# Patient Record
Sex: MALE | Race: BLACK OR AFRICAN AMERICAN | NOT HISPANIC OR LATINO
[De-identification: names, ages, dates, MRNs, and addresses within clinical notes are randomized per-mention and may not be internally consistent; named-entity substitution may affect disease eponyms.]

---

## 2020-09-18 ENCOUNTER — APPOINTMENT (OUTPATIENT)
Dept: NEUROLOGY | Facility: CLINIC | Age: 75
End: 2020-09-18
Payer: MEDICARE

## 2020-09-18 VITALS — BODY MASS INDEX: 25.9 KG/M2 | HEIGHT: 71 IN | TEMPERATURE: 98.1 F | WEIGHT: 185 LBS

## 2020-09-18 DIAGNOSIS — Z82.0 FAMILY HISTORY OF EPILEPSY AND OTHER DISEASES OF THE NERVOUS SYSTEM: ICD-10-CM

## 2020-09-18 DIAGNOSIS — N18.3 CHRONIC KIDNEY DISEASE, STAGE 3 (MODERATE): ICD-10-CM

## 2020-09-18 DIAGNOSIS — Z86.79 PERSONAL HISTORY OF OTHER DISEASES OF THE CIRCULATORY SYSTEM: ICD-10-CM

## 2020-09-18 PROCEDURE — 99204 OFFICE O/P NEW MOD 45 MIN: CPT

## 2020-09-18 NOTE — HISTORY OF PRESENT ILLNESS
[FreeTextEntry1] : Initial office visit September 18, 2020:\par This is a 74-year-old man who presents today for neurologic evaluation. About a year ago he had a syncopal event and went to the hospital. An MRI was done and showed a left basal ganglia stroke. He followed with an outside neurologist. He's had 3 or 4 other episodes of syncope. These but I we have been in the shower. He does take medicine for hypertension and additional antihypertensive drug for renal protection. He is also at least once mistaken his wife's medication, labetalol as well. His daughter also reports some mild memory loss at times and some intermittent confusion. He is here today for neurologic evaluation.

## 2020-09-18 NOTE — REASON FOR VISIT
[Consultation] : a consultation visit [Other: _____] : [unfilled] [FreeTextEntry1] : stroke, memory loss, syncope

## 2020-09-18 NOTE — CONSULT LETTER
[Dear  ___] : Dear  [unfilled], [Consult Letter:] : I had the pleasure of evaluating your patient, [unfilled]. [Please see my note below.] : Please see my note below. [Consult Closing:] : Thank you very much for allowing me to participate in the care of this patient.  If you have any questions, please do not hesitate to contact me. [Sincerely,] : Sincerely, [FreeTextEntry3] : Deny Greenwood M.D., Ph.D. DPN-N\par [facility]\par Neurology at [place]\par Medical Director of Stroke Services\par [facility]\par

## 2020-09-18 NOTE — ASSESSMENT
[FreeTextEntry1] : This is a 74-year-old man with a history of stroke last year. It was a left basal ganglia stroke and he appears to recover with no significant right-sided weakness. His daughter does report some intermittent confusion as well as intermittent syncopal episodes. I would like to do an EEG to evaluate for potential seizure. There was some mention on the report of the MRI of hippocampal asymmetry which may be age-related but also may contribute to potential seizure. I will call him with the results of this EEG in see him back in the office in 3 months. A regarding his stroke history he should continue taking a daily baby aspirin along with amlodipine as well as losartan to maintain normal blood pressure and atorvastatin to maintain an LDL under 70.

## 2020-09-18 NOTE — PHYSICAL EXAM
[General Appearance - Alert] : alert [General Appearance - In No Acute Distress] : in no acute distress [General Appearance - Well Nourished] : well nourished [General Appearance - Well Developed] : well developed [Person] : oriented to person [Place] : oriented to place [Time] : oriented to time [Short Term Intact] : short term memory intact [Remote Intact] : remote memory intact [Registration Intact] : recent registration memory intact [Span Intact] : the attention span was normal [Concentration Intact] : normal concentrating ability [Visual Intact] : visual attention was ~T not ~L decreased [Naming Objects] : no difficulty naming common objects [Repeating Phrases] : no difficulty repeating a phrase [Fluency] : fluency intact [Comprehension] : comprehension intact [Current Events] : adequate knowledge of current events [Past History] : adequate knowledge of personal past history [Cranial Nerves Optic (II)] : visual acuity intact bilaterally,  visual fields full to confrontation, pupils equal round and reactive to light [Cranial Nerves Oculomotor (III)] : extraocular motion intact [Cranial Nerves Trigeminal (V)] : facial sensation intact symmetrically [Cranial Nerves Facial (VII)] : face symmetrical [Cranial Nerves Vestibulocochlear (VIII)] : hearing was intact bilaterally [Cranial Nerves Glossopharyngeal (IX)] : tongue and palate midline [Cranial Nerves Accessory (XI - Cranial And Spinal)] : head turning and shoulder shrug symmetric [Cranial Nerves Hypoglossal (XII)] : there was no tongue deviation with protrusion [Motor Strength] : muscle strength was normal in all four extremities [No Muscle Atrophy] : normal bulk in all four extremities [Paresis Pronator Drift Right-Sided] : no pronator drift on the right [Paresis Pronator Drift Left-Sided] : no pronator drift on the left [Motor Strength Upper Extremities Bilaterally] : strength was normal in both upper extremities [Motor Strength Lower Extremities Bilaterally] : strength was normal in both lower extremities [Sensation Tactile Decrease] : light touch was intact [Sensation Pain / Temperature Decrease] : pain and temperature was intact [Sensation Vibration Decrease] : vibration was intact [Proprioception] : proprioception was intact [Balance] : balance was intact [Tremor] : no tremor present [Coordination - Dysmetria Impaired Finger-to-Nose Bilateral] : not present [2+] : Patella left 2+ [FreeTextEntry4] : 2/3 recall [Sclera] : the sclera and conjunctiva were normal [PERRL With Normal Accommodation] : pupils were equal in size, round, reactive to light, with normal accommodation [Extraocular Movements] : extraocular movements were intact [Optic Disc Abnormality] : the optic disc were normal in size and color [No APD] : no afferent pupillary defect [No MAGUE] : no internuclear ophthalmoplegia [Full Visual Field] : full visual field [Papilledema Of Both Eyes] : no papilledema [Disc Blurred Margins Both Eyes] : sharp margins [Edema] : there was no peripheral edema [Abnormal Walk] : normal gait [Involuntary Movements] : no involuntary movements were seen [Motor Tone] : muscle strength and tone were normal

## 2020-09-29 ENCOUNTER — APPOINTMENT (OUTPATIENT)
Dept: NEUROLOGY | Facility: CLINIC | Age: 75
End: 2020-09-29
Payer: MEDICARE

## 2020-09-29 PROCEDURE — 95819 EEG AWAKE AND ASLEEP: CPT

## 2020-09-29 PROCEDURE — 93040 RHYTHM ECG WITH REPORT: CPT

## 2020-11-03 ENCOUNTER — APPOINTMENT (OUTPATIENT)
Dept: UROLOGY | Facility: CLINIC | Age: 75
End: 2020-11-03
Payer: MEDICARE

## 2020-11-03 VITALS — TEMPERATURE: 96.7 F

## 2020-11-03 PROCEDURE — 99072 ADDL SUPL MATRL&STAF TM PHE: CPT

## 2020-11-03 PROCEDURE — 99204 OFFICE O/P NEW MOD 45 MIN: CPT

## 2020-11-04 LAB
APPEARANCE: CLEAR
BACTERIA: NEGATIVE
BILIRUBIN URINE: NEGATIVE
BLOOD URINE: NEGATIVE
COLOR: NORMAL
GLUCOSE QUALITATIVE U: NEGATIVE
HYALINE CASTS: 1 /LPF
KETONES URINE: NEGATIVE
LEUKOCYTE ESTERASE URINE: NEGATIVE
MICROSCOPIC-UA: NORMAL
NITRITE URINE: NEGATIVE
PH URINE: 6
PROTEIN URINE: NORMAL
RED BLOOD CELLS URINE: 2 /HPF
SPECIFIC GRAVITY URINE: 1.02
SQUAMOUS EPITHELIAL CELLS: 2 /HPF
UROBILINOGEN URINE: ABNORMAL
WHITE BLOOD CELLS URINE: 14 /HPF

## 2020-11-05 LAB — URINE CYTOLOGY: NORMAL

## 2020-11-09 NOTE — LETTER BODY
[Dear  ___] : Dear  [unfilled], [FreeTextEntry1] : I had the pleasure of seeing your patient, LEOLA MACHADO, in the office today.  \par \par Please see my office note below.\par \par Thank you for allowing me to participate in his care and please do not hesitate to contact me with any questions or concerns regarding his care.\par \par Sincerely,\par \par Ulises Varghese MD\par Chief of Urology, [facility]\par  of Urology\par [address] [place] [address]\[place] [address]\par PH:      [phone]\par Email:  [email or web address]

## 2020-11-09 NOTE — HISTORY OF PRESENT ILLNESS
[FreeTextEntry1] : Patient is a 75 year old man here with his daughter.\par Treated with radiation and chemotherapy with Dr. Soares and Dr. Abrams in [id number]. Family is unsure if it was for prostate cancer or bladder cancer.  Patient had a stroke September 2019  and a bladder mass was seen on imaging at [facility]. \par Saw Dr. Odonnell (urologist) in January, but no work up was done. \par \par Also sees Rob Stone (nephrologist) and was told daughter on imaging no mass was seen in June 2020.\par \par Denies any bothersome urinary symptoms. Saw blood in the urine 2 months ago. Denies any burning with urination. 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

## 2020-11-09 NOTE — ASSESSMENT
[FreeTextEntry1] : Will request records from Dr. Soares's office for review.\par Recommend to undergo CT urogram and office cystoscopy to evaluate. \par UA, cytology sent today.\par All questions answered.

## 2020-11-12 ENCOUNTER — OUTPATIENT (OUTPATIENT)
Dept: OUTPATIENT SERVICES | Facility: HOSPITAL | Age: 75
LOS: 1 days | End: 2020-11-12
Payer: MEDICARE

## 2020-11-12 ENCOUNTER — APPOINTMENT (OUTPATIENT)
Dept: UROLOGY | Facility: CLINIC | Age: 75
End: 2020-11-12
Payer: MEDICARE

## 2020-11-12 ENCOUNTER — APPOINTMENT (OUTPATIENT)
Dept: CT IMAGING | Facility: IMAGING CENTER | Age: 75
End: 2020-11-12
Payer: MEDICARE

## 2020-11-12 VITALS — HEART RATE: 66 BPM | DIASTOLIC BLOOD PRESSURE: 93 MMHG | SYSTOLIC BLOOD PRESSURE: 156 MMHG

## 2020-11-12 DIAGNOSIS — R31.0 GROSS HEMATURIA: ICD-10-CM

## 2020-11-12 PROCEDURE — 52000 CYSTOURETHROSCOPY: CPT

## 2020-11-12 PROCEDURE — 74178 CT ABD&PLV WO CNTR FLWD CNTR: CPT

## 2020-11-12 PROCEDURE — 82565 ASSAY OF CREATININE: CPT

## 2020-11-12 PROCEDURE — 74178 CT ABD&PLV WO CNTR FLWD CNTR: CPT | Mod: 26

## 2020-11-17 ENCOUNTER — APPOINTMENT (OUTPATIENT)
Dept: UROLOGY | Facility: CLINIC | Age: 75
End: 2020-11-17

## 2020-11-25 DIAGNOSIS — R31.0 GROSS HEMATURIA: ICD-10-CM

## 2020-12-22 ENCOUNTER — APPOINTMENT (OUTPATIENT)
Dept: NEUROLOGY | Facility: CLINIC | Age: 75
End: 2020-12-22
Payer: MEDICARE

## 2020-12-22 VITALS
WEIGHT: 190 LBS | HEIGHT: 71 IN | DIASTOLIC BLOOD PRESSURE: 74 MMHG | BODY MASS INDEX: 26.6 KG/M2 | SYSTOLIC BLOOD PRESSURE: 118 MMHG

## 2020-12-22 PROCEDURE — 99213 OFFICE O/P EST LOW 20 MIN: CPT

## 2020-12-22 PROCEDURE — 99072 ADDL SUPL MATRL&STAF TM PHE: CPT

## 2020-12-22 NOTE — ASSESSMENT
[FreeTextEntry1] : This is a 75-year-old man with a likely lacunar infarct roughly a year ago. He should continue to take aspirin as well as keep his blood pressure and lipids under control. Regarding his upcoming a bladder procedure if it is necessary he can stop the aspirin however I would request that it be held for the shortest duration possible. I will see him back in 6 months, sooner should the need arise.

## 2020-12-22 NOTE — DISCUSSION/SUMMARY
[Antithrombotic therapy with ___] : antithrombotic therapy with  [unfilled] [Intensive Blood Pressure Control] : intensive blood pressure control [Lipid Lowering Therapy] : lipid lowering therapy [Goals and Counseling] : I have reviewed the goals of stroke risk factor modification. I counseled the patient on measures to reduce stroke risk, including the importance of medication compliance, risk factor control, exercise, healthy diet and avoidance of smoking. I reviewed stroke warning signs and symptoms and appropriate actions to take if such occur.

## 2020-12-22 NOTE — PHYSICAL EXAM

## 2020-12-22 NOTE — HISTORY OF PRESENT ILLNESS
[FreeTextEntry1] : Initial office visit September 18, 2020:\par This is a 74-year-old man who presents today for neurologic evaluation. About a year ago he had a syncopal event and went to the hospital. An MRI was done and showed a left basal ganglia stroke. He followed with an outside neurologist. He's had 3 or 4 other episodes of syncope. These but I we have been in the shower. He does take medicine for hypertension and additional antihypertensive drug for renal protection. He is also at least once mistaken his wife's medication, labetalol as well. His daughter also reports some mild memory loss at times and some intermittent confusion. He is here today for neurologic evaluation.\par \par Followup December 22, 2020:\par This is a 75-year-old man who presents today for followup of stroke. He is accompanied by his son-in-law. He had a left basal ganglia stroke that was diagnosed about a year ago. He had had syncope in the past but does not report any new symptoms. Overall he is largely unchanged from his initial visit in September. He continues on medicine for hypertension and hyperlipidemia as well as aspirin. Per the chart and the patient he did have an episode of hematuria. A cystoscopy found a lesion in his bladder and per the son-in-law he is scheduled to have this operated on in January. He is here today for neurologic followup.

## 2020-12-22 NOTE — CONSULT LETTER
[Dear  ___] : Dear  [unfilled], [Courtesy Letter:] : I had the pleasure of seeing your patient, [unfilled], in my office today. [Please see my note below.] : Please see my note below. [Consult Closing:] : Thank you very much for allowing me to participate in the care of this patient.  If you have any questions, please do not hesitate to contact me. [Sincerely,] : Sincerely, [FreeTextEntry3] : Deny Greenwood M.D., Ph.D. DPN-N\par [facility]\par Neurology at [place]\par Medical Director of Stroke Services\par [facility]\par

## 2021-01-05 ENCOUNTER — APPOINTMENT (OUTPATIENT)
Dept: INTERNAL MEDICINE | Facility: CLINIC | Age: 76
End: 2021-01-05
Payer: MEDICARE

## 2021-01-05 ENCOUNTER — OUTPATIENT (OUTPATIENT)
Dept: OUTPATIENT SERVICES | Facility: HOSPITAL | Age: 76
LOS: 1 days | End: 2021-01-05
Payer: MEDICARE

## 2021-01-05 VITALS
TEMPERATURE: 98 F | HEART RATE: 65 BPM | SYSTOLIC BLOOD PRESSURE: 130 MMHG | RESPIRATION RATE: 14 BRPM | HEIGHT: 70 IN | DIASTOLIC BLOOD PRESSURE: 78 MMHG | WEIGHT: 184.97 LBS | OXYGEN SATURATION: 98 %

## 2021-01-05 VITALS
SYSTOLIC BLOOD PRESSURE: 128 MMHG | RESPIRATION RATE: 14 BRPM | TEMPERATURE: 98 F | DIASTOLIC BLOOD PRESSURE: 74 MMHG | HEART RATE: 75 BPM | OXYGEN SATURATION: 97 % | WEIGHT: 190 LBS | HEIGHT: 71 IN | BODY MASS INDEX: 26.6 KG/M2

## 2021-01-05 DIAGNOSIS — Z86.59 PERSONAL HISTORY OF OTHER MENTAL AND BEHAVIORAL DISORDERS: ICD-10-CM

## 2021-01-05 DIAGNOSIS — Z87.448 PERSONAL HISTORY OF OTHER DISEASES OF URINARY SYSTEM: ICD-10-CM

## 2021-01-05 DIAGNOSIS — Z86.73 PERSONAL HISTORY OF TRANSIENT ISCHEMIC ATTACK (TIA), AND CEREBRAL INFARCTION W/OUT RESIDUAL DEFICITS: ICD-10-CM

## 2021-01-05 DIAGNOSIS — Z87.828 PERSONAL HISTORY OF OTHER (HEALED) PHYSICAL INJURY AND TRAUMA: Chronic | ICD-10-CM

## 2021-01-05 DIAGNOSIS — H40.9 UNSPECIFIED GLAUCOMA: ICD-10-CM

## 2021-01-05 DIAGNOSIS — Z98.49 CATARACT EXTRACTION STATUS, UNSPECIFIED EYE: Chronic | ICD-10-CM

## 2021-01-05 DIAGNOSIS — R31.0 GROSS HEMATURIA: ICD-10-CM

## 2021-01-05 DIAGNOSIS — Z98.890 OTHER SPECIFIED POSTPROCEDURAL STATES: Chronic | ICD-10-CM

## 2021-01-05 DIAGNOSIS — Z87.898 PERSONAL HISTORY OF OTHER SPECIFIED CONDITIONS: ICD-10-CM

## 2021-01-05 DIAGNOSIS — Z82.49 FAMILY HISTORY OF ISCHEMIC HEART DISEASE AND OTHER DISEASES OF THE CIRCULATORY SYSTEM: ICD-10-CM

## 2021-01-05 LAB
ALBUMIN SERPL ELPH-MCNC: 4.1 G/DL — SIGNIFICANT CHANGE UP (ref 3.3–5)
ALP SERPL-CCNC: 71 U/L — SIGNIFICANT CHANGE UP (ref 40–120)
ALT FLD-CCNC: 18 U/L — SIGNIFICANT CHANGE UP (ref 4–41)
ANION GAP SERPL CALC-SCNC: 8 MMOL/L — SIGNIFICANT CHANGE UP (ref 7–14)
AST SERPL-CCNC: 15 U/L — SIGNIFICANT CHANGE UP (ref 4–40)
BILIRUB SERPL-MCNC: 0.6 MG/DL — SIGNIFICANT CHANGE UP (ref 0.2–1.2)
BUN SERPL-MCNC: 22 MG/DL — SIGNIFICANT CHANGE UP (ref 7–23)
CALCIUM SERPL-MCNC: 9.6 MG/DL — SIGNIFICANT CHANGE UP (ref 8.4–10.5)
CHLORIDE SERPL-SCNC: 103 MMOL/L — SIGNIFICANT CHANGE UP (ref 98–107)
CO2 SERPL-SCNC: 29 MMOL/L — SIGNIFICANT CHANGE UP (ref 22–31)
CREAT SERPL-MCNC: 1.8 MG/DL — HIGH (ref 0.5–1.3)
GLUCOSE SERPL-MCNC: 106 MG/DL — HIGH (ref 70–99)
HCT VFR BLD CALC: 45.3 % — SIGNIFICANT CHANGE UP (ref 39–50)
HGB BLD-MCNC: 14 G/DL — SIGNIFICANT CHANGE UP (ref 13–17)
MCHC RBC-ENTMCNC: 28.7 PG — SIGNIFICANT CHANGE UP (ref 27–34)
MCHC RBC-ENTMCNC: 30.9 GM/DL — LOW (ref 32–36)
MCV RBC AUTO: 92.8 FL — SIGNIFICANT CHANGE UP (ref 80–100)
NRBC # BLD: 0 /100 WBCS — SIGNIFICANT CHANGE UP
NRBC # FLD: 0 K/UL — SIGNIFICANT CHANGE UP
PLATELET # BLD AUTO: 190 K/UL — SIGNIFICANT CHANGE UP (ref 150–400)
POTASSIUM SERPL-MCNC: 4.1 MMOL/L — SIGNIFICANT CHANGE UP (ref 3.5–5.3)
POTASSIUM SERPL-SCNC: 4.1 MMOL/L — SIGNIFICANT CHANGE UP (ref 3.5–5.3)
PROT SERPL-MCNC: 6.9 G/DL — SIGNIFICANT CHANGE UP (ref 6–8.3)
RBC # BLD: 4.88 M/UL — SIGNIFICANT CHANGE UP (ref 4.2–5.8)
RBC # FLD: 13.6 % — SIGNIFICANT CHANGE UP (ref 10.3–14.5)
SODIUM SERPL-SCNC: 140 MMOL/L — SIGNIFICANT CHANGE UP (ref 135–145)
WBC # BLD: 5.03 K/UL — SIGNIFICANT CHANGE UP (ref 3.8–10.5)
WBC # FLD AUTO: 5.03 K/UL — SIGNIFICANT CHANGE UP (ref 3.8–10.5)

## 2021-01-05 PROCEDURE — 99072 ADDL SUPL MATRL&STAF TM PHE: CPT

## 2021-01-05 PROCEDURE — 93010 ELECTROCARDIOGRAM REPORT: CPT

## 2021-01-05 PROCEDURE — 99203 OFFICE O/P NEW LOW 30 MIN: CPT

## 2021-01-05 RX ORDER — BRIMONIDINE TARTRATE, TIMOLOL MALEATE 2; 5 MG/ML; MG/ML
0.2-0.5 SOLUTION/ DROPS OPHTHALMIC
Refills: 0 | Status: ACTIVE | COMMUNITY
Start: 2020-09-18

## 2021-01-05 RX ORDER — SODIUM CHLORIDE 9 MG/ML
3 INJECTION INTRAMUSCULAR; INTRAVENOUS; SUBCUTANEOUS EVERY 8 HOURS
Refills: 0 | Status: DISCONTINUED | OUTPATIENT
Start: 2021-01-12 | End: 2021-01-26

## 2021-01-05 RX ORDER — DORZOLAMIDE HYDROCHLORIDE AND TIMOLOL MALEATE 20; 5 MG/ML; MG/ML
22.3-6.8 SOLUTION/ DROPS OPHTHALMIC
Refills: 0 | Status: ACTIVE | COMMUNITY

## 2021-01-05 RX ORDER — SODIUM CHLORIDE 9 MG/ML
1000 INJECTION, SOLUTION INTRAVENOUS
Refills: 0 | Status: DISCONTINUED | OUTPATIENT
Start: 2021-01-12 | End: 2021-01-26

## 2021-01-05 NOTE — H&P PST ADULT - ASSESSMENT
Problem: hematuria  Assessment and Plan: Patient scheduled for surgery on 1/12/2021  Patient provided with verbal and written presurgical instructions; verbalized understanding  with teach back.    Patient provided with famotidine for GI prophylaxis; verbalized understanding.    Patient provided with Chlorhexidine wash, verbal and written instructions reviewed. Patient demonstrated understanding with teach back.   Patient confirmed COVID appointment     STOP BANG score met, OR booking notified  OR booking notified of allergies     Medical  Cardiac evaluation requested by PST for , patient verbalized understanding, will obtain  Case discussed with Dr. Ny    Problem: Hypertension  Assessment and Plan: Patient instructed to take amlodipine, losartan on day of procedure, verbalized understanding.    Patient instructed to hold aspirin and fish oil and any supplements after today ,     Plan discussed with daughter  Problem: hematuria  Assessment and Plan: Patient scheduled for surgery on 1/12/2021  Patient provided with verbal and written presurgical instructions; verbalized understanding  with teach back.    Patient provided with famotidine for GI prophylaxis; verbalized understanding.    Patient provided with Chlorhexidine wash, verbal and written instructions reviewed. Patient demonstrated understanding with teach back.   Patient confirmed COVID appointment     STOP BANG score met, OR booking notified  OR booking notified of allergies     Medical evaluation requested by PST for poor historian, low METs, syncopal episodes , patient verbalized understanding, will obtain  Cardiac evaluation requested by PST for syncopal episodes and low METs, patient verbalized understanding, will obtain    Problem: Hypertension  Assessment and Plan: Patient instructed to take amlodipine, losartan on day of procedure, verbalized understanding.    Patient instructed to hold aspirin and fish oil and any supplements after today ,     Plan discussed with daughter

## 2021-01-05 NOTE — H&P PST ADULT - NSICDXPASTMEDICALHX_GEN_ALL_CORE_FT
PAST MEDICAL HISTORY:  Chronic kidney disease (CKD) stage 3    CVA (cerebrovascular accident)     Dementia     Glaucoma     Gross hematuria     H/O cataract     HLD (hyperlipidemia)     [place] (hard of hearing)     HTN (hypertension)     Prostate cancer     Syncope several episodes origin [facility]
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
## 2021-01-05 NOTE — H&P PST ADULT - NSANTHOSAYNRD_GEN_A_CORE
No. ZAK screening performed.  STOP BANG Legend: 0-2 = LOW Risk; 3-4 = INTERMEDIATE Risk; 5-8 = HIGH Risk

## 2021-01-05 NOTE — H&P PST ADULT - HISTORY OF PRESENT ILLNESS
75 year old male with PMH of CVA ("mild" 9/2019), HLD, HTN, onset of dementia  and history of bladder or prostate cancer in 2012- treated with radiation and chemo ( daughter unsure if the sessions were completed) patient is now living with daughter and she is managing his care, patient presents with history of hematuria x2, 1.5 months ago, denies any pain or difficulty voiding, imaging studies noted a bladder mass early in 2019 but was not referred for treatment;  in September 2019 patient experienced a stroke and bladder mass noted patient referred to surgeon. Now scheduled for cystoscopy, TURB 75 year old male with PMH of CVA ("mild" 9/2019), HLD, HTN, onset of dementia?? and history of bladder or prostate cancer in 2012- treated with radiation and chemo ( daughter unsure which cancer patient had and if chemo sessions were completed) patient is now living with daughter and she is managing his care, patient presents with history of hematuria x2, 1.5 months ago, denies any pain or difficulty voiding, imaging studies noted a bladder mass early in 2019 but was not referred for treatment;  in September 2019 patient experienced a stroke and bladder mass noted on CT scan- patient referred to surgeon. Now scheduled for cystoscopy, TURB    Patient is poor historian

## 2021-01-05 NOTE — H&P PST ADULT - NEGATIVE MUSCULOSKELETAL SYMPTOMS
no muscle weakness/no stiffness/no neck pain/no arm pain L/no arm pain R/no leg pain L/no leg pain R

## 2021-01-05 NOTE — H&P PST ADULT - VENOUS THROMBOEMBOLISM CURRENT STATUS
(DULCOLAX) suppository 10 mg Daily PRN   vitamin D (CHOLECALCIFEROL) tablet 2,000 Units BID   fluticasone (FLONASE) 50 MCG/ACT nasal spray 2 spray Daily   lacosamide (VIMPAT) tablet 100 mg BID   lactulose (CHRONULAC) 10 GM/15ML solution 30 g BID   lamoTRIgine (LAMICTAL) tablet 100 mg QAM   lamoTRIgine (LAMICTAL) tablet 200 mg BID   cetirizine (ZYRTEC) tablet 10 mg Daily   LORazepam (ATIVAN) tablet 1 mg Q6H PRN   montelukast (SINGULAIR) tablet 10 mg Nightly   sodium chloride flush 0.9 % injection 10 mL 2 times per day   sodium chloride flush 0.9 % injection 10 mL PRN   acetaminophen (TYLENOL) tablet 650 mg Q4H PRN   HYDROcodone-acetaminophen (NORCO) 5-325 MG per tablet 1 tablet Q4H PRN   Or    HYDROcodone-acetaminophen (NORCO) 5-325 MG per tablet 2 tablet Q4H PRN   magnesium hydroxide (MILK OF MAGNESIA) 400 MG/5ML suspension 30 mL Daily PRN   ondansetron (ZOFRAN) injection 4 mg Q6H PRN   nicotine (NICODERM CQ) 21 MG/24HR 1 patch Daily PRN   enoxaparin (LOVENOX) injection 40 mg Daily   albuterol (PROVENTIL) nebulizer solution 2.5 mg Q2H PRN   ipratropium-albuterol (DUONEB) nebulizer solution 1 ampule Q4H WA   lansoprazole (PREVACID SOLUTAB) disintegrating tablet 30 mg QAM AC   calcium carbonate (TUMS) chewable tablet 1,250 mg Daily   polyethylene glycol (GLYCOLAX) packet 17 g Daily       Data:     Code Status:  Limited    Labs:    Hematology:  Recent Labs      01/28/18 0920 01/29/18 0347   WBC  8.0  6.6   RBC  4.05*  4.09*   HGB  12.7*  12.9*   HCT  39.4*  39.5*   MCV  97.2  96.6   MCH  31.3  31.6   MCHC  32.2  32.7   RDW  14.1  14.3   PLT  273  268   MPV  8.9  8.9     Chemistry:  Recent Labs      01/28/18   0521  01/28/18   0920  01/29/18   0347   NA  138  139  143   K  3.5*  3.8  4.1   CL  98  98  101   CO2  31  32*  31   GLUCOSE  92  109*  89   BUN  10  11  11   CREATININE  <0.40*  <0.40*  <0.40*   ANIONGAP  9  9  11   LABGLOM  CANNOT BE CALCULATED  CANNOT BE CALCULATED  CANNOT BE CALCULATED   GFRAA  CANNOT BE Diagnosis Date Noted    Pneumonia of left lower lobe due to infectious organism ([facility] [J18.1]     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]     Positive blood cultures [R78.81]     Pneumonia due to organism [J18.9] 01/23/2018    Seizure disorder ([facility] [G40.909] 11/14/2017    Cerebral palsied ([facility] [G80.9] 11/14/2017     Past Medical History:   Diagnosis Date    Cerebral palsy ([facility]     Cholelithiasis with chronic cholecystitis     Constipation     Delayed gastric emptying     Dysphagia     GERD (gastroesophageal reflux disease)     Hearing loss     Mental disability     MRDD    Profound mental retardation     Scoliosis     Seizures (HCC)     Spastic quadriparesis ([facility]         Consultations:     IP CONSULT TO INTERNAL MEDICINE  IP CONSULT TO DIETITIAN  IP CONSULT TO PULMONOLOGY  IP CONSULT TO INFECTIOUS DISEASES  IP CONSULT TO NEPHROLOGY    Plan:     1. Discharge back to home facility  2. Resume tube feedings  3. Resume home medications  4. No further antibiotics at this time  5.  Med reconciliation completed      Electronically signed by Melinda Doan DO on 1/29/2018 at 1:38 PM (2) malignancy (present or previous)

## 2021-01-06 ENCOUNTER — APPOINTMENT (OUTPATIENT)
Dept: CARDIOLOGY | Facility: CLINIC | Age: 76
End: 2021-01-06
Payer: MEDICARE

## 2021-01-06 ENCOUNTER — NON-APPOINTMENT (OUTPATIENT)
Age: 76
End: 2021-01-06

## 2021-01-06 VITALS
HEIGHT: 71 IN | TEMPERATURE: 98.4 F | OXYGEN SATURATION: 100 % | DIASTOLIC BLOOD PRESSURE: 75 MMHG | WEIGHT: 185 LBS | SYSTOLIC BLOOD PRESSURE: 134 MMHG | HEART RATE: 81 BPM | BODY MASS INDEX: 25.9 KG/M2

## 2021-01-06 VITALS — DIASTOLIC BLOOD PRESSURE: 80 MMHG | SYSTOLIC BLOOD PRESSURE: 131 MMHG

## 2021-01-06 DIAGNOSIS — Z78.9 OTHER SPECIFIED HEALTH STATUS: ICD-10-CM

## 2021-01-06 PROBLEM — I63.9 CEREBRAL INFARCTION, UNSPECIFIED: Chronic | Status: ACTIVE | Noted: 2021-01-05

## 2021-01-06 PROBLEM — H91.90 UNSPECIFIED HEARING LOSS, UNSPECIFIED EAR: Chronic | Status: ACTIVE | Noted: 2021-01-05

## 2021-01-06 PROBLEM — H40.9 UNSPECIFIED GLAUCOMA: Chronic | Status: ACTIVE | Noted: 2021-01-05

## 2021-01-06 PROBLEM — N18.9 CHRONIC KIDNEY DISEASE, UNSPECIFIED: Chronic | Status: ACTIVE | Noted: 2021-01-05

## 2021-01-06 PROBLEM — E78.5 HYPERLIPIDEMIA, UNSPECIFIED: Chronic | Status: ACTIVE | Noted: 2021-01-05

## 2021-01-06 PROBLEM — R55 SYNCOPE AND COLLAPSE: Chronic | Status: ACTIVE | Noted: 2021-01-05

## 2021-01-06 PROBLEM — Z86.69 PERSONAL HISTORY OF OTHER DISEASES OF THE NERVOUS SYSTEM AND SENSE ORGANS: Chronic | Status: ACTIVE | Noted: 2021-01-05

## 2021-01-06 PROBLEM — C61 MALIGNANT NEOPLASM OF PROSTATE: Chronic | Status: ACTIVE | Noted: 2021-01-05

## 2021-01-06 PROBLEM — F03.90 UNSPECIFIED DEMENTIA WITHOUT BEHAVIORAL DISTURBANCE: Chronic | Status: ACTIVE | Noted: 2021-01-05

## 2021-01-06 PROBLEM — R31.0 GROSS HEMATURIA: Chronic | Status: ACTIVE | Noted: 2021-01-05

## 2021-01-06 PROBLEM — I10 ESSENTIAL (PRIMARY) HYPERTENSION: Chronic | Status: ACTIVE | Noted: 2021-01-05

## 2021-01-06 LAB
CULTURE RESULTS: NO GROWTH — SIGNIFICANT CHANGE UP
SPECIMEN SOURCE: SIGNIFICANT CHANGE UP

## 2021-01-06 PROCEDURE — 93000 ELECTROCARDIOGRAM COMPLETE: CPT

## 2021-01-06 PROCEDURE — 99205 OFFICE O/P NEW HI 60 MIN: CPT

## 2021-01-06 PROCEDURE — 99072 ADDL SUPL MATRL&STAF TM PHE: CPT

## 2021-01-06 NOTE — DISCUSSION/SUMMARY
[Procedure Low Risk] : the procedure risk is low [Optimized for Surgery] : the patient is optimized for surgery [As per surgery] : as per surgery [Continue] : Continue medications as currently directed [FreeTextEntry1] : This is a 75 yea rold male with CKD, and stroke, prostate cancer in the past, now with bladder mass here for cystoscopy and bladder biopsy\par \par 1)  Pre-operative cardiovascular risk evaluation and management : No cardiac Symptoms. No diagnostic ischemic changes on ECG. METs > 4.  RCRI score < 1%. Riggins perioperative risk score < 1%. NSQIP score < 1%. No further cardiac work up is needed. Proceed for surgery as indicated.\par Any further work up will not change the risk of this patient. Benefits of surgery outweigh the risk. \par will hold aspirin 4 days prior to procedure. \par 2) stroke: aspirin. statins. \par 3) cardiac prevention: next visit\par 4) syncope: no actual LOC. will discuss it nextt visit. appears non cardiac. \par

## 2021-01-06 NOTE — HISTORY OF PRESENT ILLNESS
[Preoperative Visit] : for a medical evaluation prior to surgery [Scheduled Procedure ___] : a [unfilled] [Date of Surgery ___] : on [unfilled] [Surgeon Name ___] : surgeon: [unfilled] [Dysuria] : dysuria [Renal Disease] : renal disease [Prior Anesthesia] : Prior anesthesia [Electrocardiogram] : ~T an ECG ~C was performed [Metabolic Capacity ___Mets%] : The patient has a metabolic capacity of [unfilled] Mets%  [Good] : Good [Fever] : no fever [Chills] : no chills [Fatigue] : no fatigue [Chest Pain] : no chest pain [Cough] : no cough [Dyspnea] : no dyspnea [Urinary Frequency] : no urinary frequency [Nausea] : no nausea [Vomiting] : no vomiting [Diarrhea] : no diarrhea [Abdominal Pain] : no abdominal pain [Easy Bruising] : no easy bruising [Lower Extremity Swelling] : no lower extremity swelling [Poor Exercise Tolerance] : no poor exercise tolerance [Diabetes] : no diabetes [Cardiovascular Disease] : no cardiovascular disease [Pulmonary Disease] : no pulmonary disease [Anti-Platelet Agents] : no anti-platelet agents [Nicotine Dependence] : no nicotine dependence [Alcohol Use] : no  alcohol use [GI Disease] : no gastrointestinal disease [Sleep Apnea] : no sleep apnea [Thromboembolic Problems] : no thromboembolic problems [Clotting Disorder] : no clotting disorder [Frequent use of NSAIDs] : no use of NSAIDs [Bleeding Disorder] : no bleeding disorder [Impaired Immunity] : no impaired immunity [Steroid Use in Last 6 Months] : no steroid use in the last six months [Frequent Aspirin Use] : no frequent aspirin use [Prev Anesthesia Reaction] : no previous anesthesia reaction [de-identified] : JEFFERY [de-identified] : >4 [FreeTextEntry1] : Pre-operative cardiovascular risk evaluation and management \par \par This is a 75 year old male with history of prior stroke, CKD, unclear cause, and prior prostate CA, recently diagnosed with  bladder mass.\par Planning for cystoscopy and biospy\par denies any symptoms. no chest pain. . no dyspnea.

## 2021-01-06 NOTE — PHYSICAL EXAM
[General Appearance - Well Developed] : well developed [Normal Appearance] : normal appearance [Well Groomed] : well groomed [General Appearance - Well Nourished] : well nourished [No Deformities] : no deformities [General Appearance - In No Acute Distress] : no acute distress [Normal Conjunctiva] : the conjunctiva exhibited no abnormalities [Eyelids - No Xanthelasma] : the eyelids demonstrated no xanthelasmas [Normal Oral Mucosa] : normal oral mucosa [No Oral Pallor] : no oral pallor [No Oral Cyanosis] : no oral cyanosis [Normal Jugular Venous A Waves Present] : normal jugular venous A waves present [Normal Jugular Venous V Waves Present] : normal jugular venous V waves present [No Jugular Venous Schmidt A Waves] : no jugular venous schmidt A waves [Respiration, Rhythm And Depth] : normal respiratory rhythm and effort [Exaggerated Use Of Accessory Muscles For Inspiration] : no accessory muscle use [Auscultation Breath Sounds / Voice Sounds] : lungs were clear to auscultation bilaterally [Heart Rate And Rhythm] : heart rate and rhythm were normal [Heart Sounds] : normal S1 and S2 [Murmurs] : no murmurs present [Abdomen Soft] : soft [Abdomen Tenderness] : non-tender [Abdomen Mass (___ Cm)] : no abdominal mass palpated [Abnormal Walk] : normal gait [Gait - Sufficient For Exercise Testing] : the gait was sufficient for exercise testing [Nail Clubbing] : no clubbing of the fingernails [Cyanosis, Localized] : no localized cyanosis [Petechial Hemorrhages (___cm)] : no petechial hemorrhages [Skin Color & Pigmentation] : normal skin color and pigmentation [] : no rash [No Venous Stasis] : no venous stasis [Skin Lesions] : no skin lesions [No Skin Ulcers] : no skin ulcer [No Xanthoma] : no  xanthoma was observed [Oriented To Time, Place, And Person] : oriented to person, place, and time [Affect] : the affect was normal [Mood] : the mood was normal [No Anxiety] : not feeling anxious

## 2021-01-08 NOTE — ASSESSMENT
[Patient Optimized for Surgery] : Patient optimized for surgery [Cardiology consultation] : Cardiology consultation [Other: _____] : [unfilled] [FreeTextEntry4] : This is a 75-year-old male is here today for medical clearance\par \par His history is pertinent for prior CVA with no residual deficits. He saw his neurologist on 12/22/20, and per documentation, his aspirin needs to be held prior to procedure, is advised it is held for the shortest period of time.\par \par Patient does have vascular disease, therefore to fully evaluate his risks and ensure optimization for upcoming procedure cardiology consultation is advised

## 2021-01-08 NOTE — ADDENDUM
[FreeTextEntry1] : Cleared by cardiology\par SPoke with patients Nephrologist. Recent pre-surgical labs demonstrate pts baseline renal function. No need for clearance by Nephro with current renal fxn. \par \par Pt optimized for upcoming procedure.\par He is to f/u w/ me post-operatively

## 2021-01-08 NOTE — HISTORY OF PRESENT ILLNESS
[Coronary Artery Disease] : coronary artery disease [No Pertinent Pulmonary History] : no history of asthma, COPD, sleep apnea, or smoking [No Adverse Anesthesia Reaction] : no adverse anesthesia reaction in self or family member [Chronic Kidney Disease] : chronic kidney disease [(Patient denies any chest pain, claudication, dyspnea on exertion, orthopnea, palpitations or syncope)] : Patient denies any chest pain, claudication, dyspnea on exertion, orthopnea, palpitations or syncope [Good (7-10 METs)] : Good (7-10 METs) [Aortic Stenosis] : no aortic stenosis [Atrial Fibrillation] : no atrial fibrillation [Recent Myocardial Infarction] : no recent myocardial infarction [Implantable Device/Pacemaker] : no implantable device/pacemaker [Chronic Anticoagulation] : no chronic anticoagulation [Diabetes] : no diabetes [Anti-Platelet Agents: _____] : Anti-Platelet Agents: [unfilled] [FreeTextEntry1] : Cystoscopy w/ Tumor removal [FreeTextEntry2] : 1/12/21 [FreeTextEntry3] : Dr. Varghese [FreeTextEntry4] : -PMH: H/o CVA (9/2019). HTN, HLD, H/o Prostate CA (Dx 2013), Gait Instability \par \par LEOLA is a 75 year M whom is here today to establish care w/ a new PMD & to obtain medical clearance for upcoming procedure\par Today, pt reports feeling well and is w/o complaints. \par He is accompanied by his son in law today \par He does mention h/o recurrent syncopal episodes last witnessed episode 7/2020. Has never seen cardio. \par Pt had pre-Surgical testing completed today at [facility]\par \par (1/5/21) presurgical testing results\par CBC WNL\par CMP: BUN/cr 22/1.8, GFR 36\par UA WNL\par \par Specialists Involved:\par -Prior PMD: Dr. Schafer\par -Neuro: Dr. Greenwood\par -Nephro: Dr. Rob Stone ([phone])\par \par -H/o CVA 9/2019 w/ no residual deficits: Was seen by Neuro on 12/22/20 for clearance and advised that it would be ok to DC Aspirin but for the shortest period of time possible.  He should continue to take aspirin and maintain adequate BP & Lipid control. Per Neuro, prior to upcoming bladder procedure if is necessary, can stop aspirin prior but  for the shortest duration possible\par \par -HTN: BP controlled on losartan 25 mg q.d. and Norvasc 5 mg q.d. He does not follow with cardiology. Currently denies chest pain, palpitations, lower extremity edema or shortness of breath\par -HLD: Controlled on Lipitor 40 mg h.s. [Family Member] : family member

## 2021-01-09 ENCOUNTER — APPOINTMENT (OUTPATIENT)
Dept: DISASTER EMERGENCY | Facility: CLINIC | Age: 76
End: 2021-01-09

## 2021-01-09 ENCOUNTER — TRANSCRIPTION ENCOUNTER (OUTPATIENT)
Age: 76
End: 2021-01-09

## 2021-01-11 ENCOUNTER — TRANSCRIPTION ENCOUNTER (OUTPATIENT)
Age: 76
End: 2021-01-11

## 2021-01-11 LAB — SARS-COV-2 N GENE NPH QL NAA+PROBE: NOT DETECTED

## 2021-01-11 NOTE — ASU PATIENT PROFILE, ADULT - PMH
Chronic kidney disease (CKD)  stage 3  CVA (cerebrovascular accident)    Dementia    Glaucoma    Gross hematuria    H/O cataract    HLD (hyperlipidemia)    [place] (hard of hearing)    HTN (hypertension)    Prostate cancer    Syncope  several episodes origin [facility]
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
## 2021-01-12 ENCOUNTER — RESULT REVIEW (OUTPATIENT)
Age: 76
End: 2021-01-12

## 2021-01-12 ENCOUNTER — OUTPATIENT (OUTPATIENT)
Dept: OUTPATIENT SERVICES | Facility: HOSPITAL | Age: 76
LOS: 1 days | Discharge: HOME | End: 2021-01-12
Payer: MEDICARE

## 2021-01-12 ENCOUNTER — APPOINTMENT (OUTPATIENT)
Dept: UROLOGY | Facility: HOSPITAL | Age: 76
End: 2021-01-12

## 2021-01-12 VITALS
OXYGEN SATURATION: 100 % | RESPIRATION RATE: 17 BRPM | SYSTOLIC BLOOD PRESSURE: 131 MMHG | DIASTOLIC BLOOD PRESSURE: 66 MMHG | HEART RATE: 94 BPM

## 2021-01-12 VITALS
HEART RATE: 76 BPM | RESPIRATION RATE: 16 BRPM | SYSTOLIC BLOOD PRESSURE: 147 MMHG | HEIGHT: 70 IN | DIASTOLIC BLOOD PRESSURE: 65 MMHG | TEMPERATURE: 98 F | OXYGEN SATURATION: 99 % | WEIGHT: 184.09 LBS

## 2021-01-12 DIAGNOSIS — Z98.890 OTHER SPECIFIED POSTPROCEDURAL STATES: Chronic | ICD-10-CM

## 2021-01-12 DIAGNOSIS — Z87.828 PERSONAL HISTORY OF OTHER (HEALED) PHYSICAL INJURY AND TRAUMA: Chronic | ICD-10-CM

## 2021-01-12 DIAGNOSIS — R31.0 GROSS HEMATURIA: ICD-10-CM

## 2021-01-12 DIAGNOSIS — Z98.49 CATARACT EXTRACTION STATUS, UNSPECIFIED EYE: Chronic | ICD-10-CM

## 2021-01-12 PROCEDURE — 88307 TISSUE EXAM BY PATHOLOGIST: CPT | Mod: 26

## 2021-01-12 PROCEDURE — 52240 CYSTOSCOPY AND TREATMENT: CPT

## 2021-01-12 RX ORDER — HYDROMORPHONE HYDROCHLORIDE 2 MG/ML
0.5 INJECTION INTRAMUSCULAR; INTRAVENOUS; SUBCUTANEOUS
Refills: 0 | Status: DISCONTINUED | OUTPATIENT
Start: 2021-01-12 | End: 2021-01-12

## 2021-01-12 RX ORDER — SODIUM CHLORIDE 9 MG/ML
1000 INJECTION, SOLUTION INTRAVENOUS
Refills: 0 | Status: DISCONTINUED | OUTPATIENT
Start: 2021-01-12 | End: 2021-01-26

## 2021-01-12 NOTE — ASU DISCHARGE PLAN (ADULT/PEDIATRIC) - CALL YOUR DOCTOR IF YOU HAVE ANY OF THE FOLLOWING:
Fever greater than (need to indicate Fahrenheit or Celsius) passing large blood clots or unable to urinate or increasing blood in urine/Bleeding that does not stop/Pain not relieved by Medications/Fever greater than (need to indicate Fahrenheit or Celsius)/Wound/Surgical Site with redness, or foul smelling discharge or pus/Nausea and vomiting that does not stop/Unable to urinate

## 2021-01-12 NOTE — ASU DISCHARGE PLAN (ADULT/PEDIATRIC) - CARE PROVIDER_API CALL
Ulises Varghese)  Urology  [address] [place] [address], [place] [address]  Phone: [phone]  Fax: [phone]  Follow Up Time:

## 2021-01-12 NOTE — ASU DISCHARGE PLAN (ADULT/PEDIATRIC) - NURSING INSTRUCTIONS
DO NOT take any Tylenol (Acetaminophen) or narcotics containing Tylenol until after 3:00 PM. You received Tylenol during your operation and it can cause damage to your liver if too much is taken within a 24 hour time period.     Your urine color should improve from bloody color to a lighter cranberry as you increase your fluids.  If it doesn't call MD.  If you don't have any urine output for more than 1 hour, drink 2 glasses of fluids and attempt void.  If unable, call MD.

## 2021-01-12 NOTE — ASU DISCHARGE PLAN (ADULT/PEDIATRIC) - ASU DC SPECIAL INSTRUCTIONSFT
1. Drink plenty of fluids.  2. No exercising or heavy lifting until cleared by Dr. Varghese  3. Take tylenol and motrin as needed for pain.

## 2021-01-14 LAB — SURGICAL PATHOLOGY STUDY: SIGNIFICANT CHANGE UP

## 2021-01-22 DIAGNOSIS — R31.0 GROSS HEMATURIA: ICD-10-CM

## 2021-01-26 ENCOUNTER — NON-APPOINTMENT (OUTPATIENT)
Age: 76
End: 2021-01-26

## 2021-02-02 ENCOUNTER — APPOINTMENT (OUTPATIENT)
Dept: CARDIOLOGY | Facility: CLINIC | Age: 76
End: 2021-02-02
Payer: MEDICARE

## 2021-02-02 VITALS
DIASTOLIC BLOOD PRESSURE: 74 MMHG | OXYGEN SATURATION: 99 % | SYSTOLIC BLOOD PRESSURE: 120 MMHG | HEART RATE: 64 BPM | WEIGHT: 185 LBS | RESPIRATION RATE: 17 BRPM | BODY MASS INDEX: 25.9 KG/M2 | HEIGHT: 71 IN | TEMPERATURE: 97.5 F

## 2021-02-02 PROCEDURE — 99214 OFFICE O/P EST MOD 30 MIN: CPT

## 2021-02-02 PROCEDURE — 99072 ADDL SUPL MATRL&STAF TM PHE: CPT

## 2021-02-02 RX ORDER — PSYLLIUM HUSK 0.4 G
CAPSULE ORAL
Refills: 0 | Status: DISCONTINUED | COMMUNITY
End: 2021-02-02

## 2021-02-03 ENCOUNTER — APPOINTMENT (OUTPATIENT)
Dept: CARDIOLOGY | Facility: CLINIC | Age: 76
End: 2021-02-03
Payer: MEDICARE

## 2021-02-03 ENCOUNTER — APPOINTMENT (OUTPATIENT)
Dept: UROLOGY | Facility: CLINIC | Age: 76
End: 2021-02-03

## 2021-02-03 PROCEDURE — 93880 EXTRACRANIAL BILAT STUDY: CPT

## 2021-02-03 PROCEDURE — 76376 3D RENDER W/INTRP POSTPROCES: CPT

## 2021-02-03 PROCEDURE — 93306 TTE W/DOPPLER COMPLETE: CPT

## 2021-02-03 PROCEDURE — 99072 ADDL SUPL MATRL&STAF TM PHE: CPT

## 2021-02-04 ENCOUNTER — OUTPATIENT (OUTPATIENT)
Dept: OUTPATIENT SERVICES | Facility: HOSPITAL | Age: 76
LOS: 1 days | End: 2021-02-04
Payer: MEDICARE

## 2021-02-04 ENCOUNTER — APPOINTMENT (OUTPATIENT)
Dept: UROLOGY | Facility: CLINIC | Age: 76
End: 2021-02-04
Payer: MEDICARE

## 2021-02-04 VITALS — HEART RATE: 78 BPM | SYSTOLIC BLOOD PRESSURE: 144 MMHG | DIASTOLIC BLOOD PRESSURE: 85 MMHG

## 2021-02-04 VITALS — HEART RATE: 64 BPM | DIASTOLIC BLOOD PRESSURE: 77 MMHG | SYSTOLIC BLOOD PRESSURE: 139 MMHG

## 2021-02-04 DIAGNOSIS — Z98.49 CATARACT EXTRACTION STATUS, UNSPECIFIED EYE: Chronic | ICD-10-CM

## 2021-02-04 DIAGNOSIS — Z98.890 OTHER SPECIFIED POSTPROCEDURAL STATES: Chronic | ICD-10-CM

## 2021-02-04 DIAGNOSIS — Z87.828 PERSONAL HISTORY OF OTHER (HEALED) PHYSICAL INJURY AND TRAUMA: Chronic | ICD-10-CM

## 2021-02-04 DIAGNOSIS — R35.0 FREQUENCY OF MICTURITION: ICD-10-CM

## 2021-02-04 PROCEDURE — 51720 TREATMENT OF BLADDER LESION: CPT

## 2021-02-04 RX ORDER — GEMCITABINE 38 MG/ML
2 INJECTION, SOLUTION INTRAVENOUS ONCE
Refills: 0 | Status: DISCONTINUED | OUTPATIENT
Start: 2021-02-04 | End: 2021-02-19

## 2021-02-04 RX ORDER — GEMCITABINE 2 G/50ML
2 INJECTION, POWDER, LYOPHILIZED, FOR SOLUTION INTRAVENOUS
Qty: 1 | Refills: 0 | Status: COMPLETED | OUTPATIENT
Start: 2021-02-04

## 2021-02-04 RX ADMIN — GEMCITABINE HYDROCHLORIDE 0 GM: 1 INJECTION, POWDER, LYOPHILIZED, FOR SOLUTION INTRAVENOUS at 00:00

## 2021-02-11 ENCOUNTER — APPOINTMENT (OUTPATIENT)
Dept: UROLOGY | Facility: CLINIC | Age: 76
End: 2021-02-11
Payer: MEDICARE

## 2021-02-11 ENCOUNTER — OUTPATIENT (OUTPATIENT)
Dept: OUTPATIENT SERVICES | Facility: HOSPITAL | Age: 76
LOS: 1 days | End: 2021-02-11
Payer: MEDICARE

## 2021-02-11 VITALS — RESPIRATION RATE: 16 BRPM | DIASTOLIC BLOOD PRESSURE: 73 MMHG | SYSTOLIC BLOOD PRESSURE: 122 MMHG | HEART RATE: 71 BPM

## 2021-02-11 DIAGNOSIS — Z98.890 OTHER SPECIFIED POSTPROCEDURAL STATES: Chronic | ICD-10-CM

## 2021-02-11 DIAGNOSIS — Z98.49 CATARACT EXTRACTION STATUS, UNSPECIFIED EYE: Chronic | ICD-10-CM

## 2021-02-11 DIAGNOSIS — Z87.828 PERSONAL HISTORY OF OTHER (HEALED) PHYSICAL INJURY AND TRAUMA: Chronic | ICD-10-CM

## 2021-02-11 DIAGNOSIS — R35.0 FREQUENCY OF MICTURITION: ICD-10-CM

## 2021-02-11 PROCEDURE — 51720 TREATMENT OF BLADDER LESION: CPT

## 2021-02-11 RX ORDER — GEMCITABINE 2 G/50ML
2 INJECTION, POWDER, LYOPHILIZED, FOR SOLUTION INTRAVENOUS
Qty: 1 | Refills: 0 | Status: COMPLETED | OUTPATIENT
Start: 2021-02-11

## 2021-02-11 RX ORDER — GEMCITABINE 38 MG/ML
2 INJECTION, SOLUTION INTRAVENOUS ONCE
Refills: 0 | Status: DISCONTINUED | OUTPATIENT
Start: 2021-02-11 | End: 2021-02-25

## 2021-02-11 RX ADMIN — GEMCITABINE 0 GM: 1 INJECTION, POWDER, LYOPHILIZED, FOR SOLUTION INTRAVENOUS at 00:00

## 2021-02-12 DIAGNOSIS — C67.8 MALIGNANT NEOPLASM OF OVERLAPPING SITES OF BLADDER: ICD-10-CM

## 2021-02-17 ENCOUNTER — APPOINTMENT (OUTPATIENT)
Dept: CARDIOLOGY | Facility: CLINIC | Age: 76
End: 2021-02-17
Payer: MEDICARE

## 2021-02-17 PROCEDURE — 93320 DOPPLER ECHO COMPLETE: CPT

## 2021-02-17 PROCEDURE — 93351 STRESS TTE COMPLETE: CPT

## 2021-02-17 PROCEDURE — 0439T MYOCRD CONTRAST PRFUJ ECHO: CPT | Mod: 59

## 2021-02-17 PROCEDURE — 93352 ADMIN ECG CONTRAST AGENT: CPT

## 2021-02-17 PROCEDURE — 99072 ADDL SUPL MATRL&STAF TM PHE: CPT

## 2021-02-18 ENCOUNTER — OUTPATIENT (OUTPATIENT)
Dept: OUTPATIENT SERVICES | Facility: HOSPITAL | Age: 76
LOS: 1 days | End: 2021-02-18
Payer: MEDICARE

## 2021-02-18 ENCOUNTER — APPOINTMENT (OUTPATIENT)
Dept: UROLOGY | Facility: CLINIC | Age: 76
End: 2021-02-18
Payer: MEDICARE

## 2021-02-18 VITALS — HEART RATE: 70 BPM | SYSTOLIC BLOOD PRESSURE: 158 MMHG | DIASTOLIC BLOOD PRESSURE: 76 MMHG

## 2021-02-18 VITALS — DIASTOLIC BLOOD PRESSURE: 74 MMHG | TEMPERATURE: 96.8 F | HEART RATE: 94 BPM | SYSTOLIC BLOOD PRESSURE: 145 MMHG

## 2021-02-18 DIAGNOSIS — R35.0 FREQUENCY OF MICTURITION: ICD-10-CM

## 2021-02-18 DIAGNOSIS — Z98.49 CATARACT EXTRACTION STATUS, UNSPECIFIED EYE: Chronic | ICD-10-CM

## 2021-02-18 DIAGNOSIS — Z98.890 OTHER SPECIFIED POSTPROCEDURAL STATES: Chronic | ICD-10-CM

## 2021-02-18 DIAGNOSIS — Z87.828 PERSONAL HISTORY OF OTHER (HEALED) PHYSICAL INJURY AND TRAUMA: Chronic | ICD-10-CM

## 2021-02-18 PROCEDURE — 51720 TREATMENT OF BLADDER LESION: CPT

## 2021-02-18 RX ORDER — GEMCITABINE HYDROCHLORIDE 1 G/1
1 INJECTION, POWDER, LYOPHILIZED, FOR SOLUTION INTRAVENOUS
Qty: 1 | Refills: 0 | Status: COMPLETED | OUTPATIENT
Start: 2021-02-18

## 2021-02-18 RX ORDER — GEMCITABINE 38 MG/ML
1 INJECTION, SOLUTION INTRAVENOUS ONCE
Refills: 0 | Status: DISCONTINUED | OUTPATIENT
Start: 2021-02-18 | End: 2021-03-04

## 2021-02-18 RX ADMIN — GEMCITABINE 1 GM: 1 INJECTION, POWDER, LYOPHILIZED, FOR SOLUTION INTRAVENOUS at 00:00

## 2021-02-19 DIAGNOSIS — C67.8 MALIGNANT NEOPLASM OF OVERLAPPING SITES OF BLADDER: ICD-10-CM

## 2021-02-25 ENCOUNTER — APPOINTMENT (OUTPATIENT)
Dept: UROLOGY | Facility: CLINIC | Age: 76
End: 2021-02-25
Payer: MEDICARE

## 2021-02-25 ENCOUNTER — OUTPATIENT (OUTPATIENT)
Dept: OUTPATIENT SERVICES | Facility: HOSPITAL | Age: 76
LOS: 1 days | End: 2021-02-25
Payer: MEDICARE

## 2021-02-25 VITALS
DIASTOLIC BLOOD PRESSURE: 74 MMHG | HEART RATE: 78 BPM | RESPIRATION RATE: 17 BRPM | TEMPERATURE: 98.1 F | SYSTOLIC BLOOD PRESSURE: 116 MMHG

## 2021-02-25 VITALS — DIASTOLIC BLOOD PRESSURE: 79 MMHG | SYSTOLIC BLOOD PRESSURE: 128 MMHG | HEART RATE: 61 BPM | RESPIRATION RATE: 16 BRPM

## 2021-02-25 DIAGNOSIS — R35.0 FREQUENCY OF MICTURITION: ICD-10-CM

## 2021-02-25 DIAGNOSIS — Z87.828 PERSONAL HISTORY OF OTHER (HEALED) PHYSICAL INJURY AND TRAUMA: Chronic | ICD-10-CM

## 2021-02-25 DIAGNOSIS — C67.8 MALIGNANT NEOPLASM OF OVERLAPPING SITES OF BLADDER: ICD-10-CM

## 2021-02-25 DIAGNOSIS — Z98.49 CATARACT EXTRACTION STATUS, UNSPECIFIED EYE: Chronic | ICD-10-CM

## 2021-02-25 DIAGNOSIS — Z98.890 OTHER SPECIFIED POSTPROCEDURAL STATES: Chronic | ICD-10-CM

## 2021-02-25 PROCEDURE — 51720 TREATMENT OF BLADDER LESION: CPT

## 2021-02-25 RX ORDER — HYOSCYAMINE SULFATE 0.12 MG/1
0.12 TABLET, ORALLY DISINTEGRATING ORAL
Qty: 1 | Refills: 0 | Status: COMPLETED | OUTPATIENT
Start: 2021-02-25 | End: 2021-02-25

## 2021-02-25 RX ORDER — HYOSCYAMINE SULFATE 0.12 MG/1
0.12 TABLET SUBLINGUAL
Refills: 0 | Status: COMPLETED | OUTPATIENT
Start: 2021-02-25

## 2021-02-25 RX ORDER — GEMCITABINE HYDROCHLORIDE 1 G/1
1 INJECTION, POWDER, LYOPHILIZED, FOR SOLUTION INTRAVENOUS
Qty: 1 | Refills: 0 | Status: COMPLETED | OUTPATIENT
Start: 2021-02-18 | End: 2021-02-25

## 2021-02-25 RX ORDER — GEMCITABINE 38 MG/ML
1 INJECTION, SOLUTION INTRAVENOUS ONCE
Refills: 0 | Status: DISCONTINUED | OUTPATIENT
Start: 2021-02-25 | End: 2021-03-12

## 2021-02-25 RX ADMIN — GEMCITABINE 0 MG: 1 INJECTION, POWDER, LYOPHILIZED, FOR SOLUTION INTRAVENOUS at 00:00

## 2021-02-25 RX ADMIN — HYOSCYAMINE SULFATE MG: 0.12 TABLET ORAL at 00:00

## 2021-02-25 RX ADMIN — HYOSCYAMINE SULFATE 0 MG: 0.12 TABLET ORAL; SUBLINGUAL at 00:00

## 2021-03-03 ENCOUNTER — APPOINTMENT (OUTPATIENT)
Dept: CARDIOLOGY | Facility: CLINIC | Age: 76
End: 2021-03-03

## 2021-03-04 ENCOUNTER — APPOINTMENT (OUTPATIENT)
Dept: UROLOGY | Facility: CLINIC | Age: 76
End: 2021-03-04
Payer: MEDICARE

## 2021-03-04 ENCOUNTER — OUTPATIENT (OUTPATIENT)
Dept: OUTPATIENT SERVICES | Facility: HOSPITAL | Age: 76
LOS: 1 days | End: 2021-03-04
Payer: MEDICARE

## 2021-03-04 VITALS — RESPIRATION RATE: 18 BRPM | DIASTOLIC BLOOD PRESSURE: 88 MMHG | SYSTOLIC BLOOD PRESSURE: 144 MMHG | HEART RATE: 85 BPM

## 2021-03-04 VITALS — HEART RATE: 66 BPM | RESPIRATION RATE: 16 BRPM | SYSTOLIC BLOOD PRESSURE: 133 MMHG | DIASTOLIC BLOOD PRESSURE: 84 MMHG

## 2021-03-04 DIAGNOSIS — R35.0 FREQUENCY OF MICTURITION: ICD-10-CM

## 2021-03-04 DIAGNOSIS — Z98.49 CATARACT EXTRACTION STATUS, UNSPECIFIED EYE: Chronic | ICD-10-CM

## 2021-03-04 DIAGNOSIS — Z87.828 PERSONAL HISTORY OF OTHER (HEALED) PHYSICAL INJURY AND TRAUMA: Chronic | ICD-10-CM

## 2021-03-04 DIAGNOSIS — Z98.890 OTHER SPECIFIED POSTPROCEDURAL STATES: Chronic | ICD-10-CM

## 2021-03-04 PROCEDURE — 51720 TREATMENT OF BLADDER LESION: CPT

## 2021-03-04 RX ORDER — HYOSCYAMINE SULFATE 0.12 MG/1
0.12 TABLET SUBLINGUAL
Refills: 0 | Status: COMPLETED | OUTPATIENT
Start: 2021-03-04

## 2021-03-04 RX ORDER — GEMCITABINE HYDROCHLORIDE 1 G/1
1 INJECTION, POWDER, LYOPHILIZED, FOR SOLUTION INTRAVENOUS
Qty: 1 | Refills: 0 | Status: COMPLETED | OUTPATIENT
Start: 2021-03-04

## 2021-03-04 RX ORDER — GEMCITABINE 38 MG/ML
1 INJECTION, SOLUTION INTRAVENOUS ONCE
Refills: 0 | Status: DISCONTINUED | OUTPATIENT
Start: 2021-03-04 | End: 2021-03-18

## 2021-03-04 RX ADMIN — HYOSCYAMINE SULFATE 0 MG: 0.12 TABLET ORAL; SUBLINGUAL at 00:00

## 2021-03-04 RX ADMIN — GEMCITABINE 0 GM: 1 INJECTION, POWDER, LYOPHILIZED, FOR SOLUTION INTRAVENOUS at 00:00

## 2021-03-11 ENCOUNTER — APPOINTMENT (OUTPATIENT)
Dept: UROLOGY | Facility: CLINIC | Age: 76
End: 2021-03-11
Payer: MEDICARE

## 2021-03-11 ENCOUNTER — OUTPATIENT (OUTPATIENT)
Dept: OUTPATIENT SERVICES | Facility: HOSPITAL | Age: 76
LOS: 1 days | End: 2021-03-11
Payer: MEDICARE

## 2021-03-11 VITALS — HEART RATE: 88 BPM | DIASTOLIC BLOOD PRESSURE: 77 MMHG | RESPIRATION RATE: 18 BRPM | SYSTOLIC BLOOD PRESSURE: 138 MMHG

## 2021-03-11 VITALS — RESPIRATION RATE: 16 BRPM | SYSTOLIC BLOOD PRESSURE: 155 MMHG | HEART RATE: 88 BPM | DIASTOLIC BLOOD PRESSURE: 80 MMHG

## 2021-03-11 DIAGNOSIS — Z98.49 CATARACT EXTRACTION STATUS, UNSPECIFIED EYE: Chronic | ICD-10-CM

## 2021-03-11 DIAGNOSIS — Z87.828 PERSONAL HISTORY OF OTHER (HEALED) PHYSICAL INJURY AND TRAUMA: Chronic | ICD-10-CM

## 2021-03-11 DIAGNOSIS — Z98.890 OTHER SPECIFIED POSTPROCEDURAL STATES: Chronic | ICD-10-CM

## 2021-03-11 DIAGNOSIS — R35.0 FREQUENCY OF MICTURITION: ICD-10-CM

## 2021-03-11 PROCEDURE — 51720 TREATMENT OF BLADDER LESION: CPT

## 2021-03-11 PROCEDURE — A4218: CPT | Mod: NC

## 2021-03-11 RX ORDER — GEMCITABINE 38 MG/ML
1 INJECTION, SOLUTION INTRAVENOUS ONCE
Refills: 0 | Status: DISCONTINUED | OUTPATIENT
Start: 2021-03-11 | End: 2021-03-25

## 2021-03-11 RX ORDER — HYOSCYAMINE SULFATE 0.12 MG/1
0.12 TABLET, ORALLY DISINTEGRATING ORAL
Qty: 1 | Refills: 0 | Status: COMPLETED | OUTPATIENT
Start: 2021-03-03 | End: 2021-03-11

## 2021-03-11 RX ORDER — GEMCITABINE HYDROCHLORIDE 1 G/1
1 INJECTION, POWDER, LYOPHILIZED, FOR SOLUTION INTRAVENOUS
Qty: 1 | Refills: 0 | Status: COMPLETED | OUTPATIENT
Start: 2021-03-11

## 2021-03-11 RX ORDER — GEMCITABINE HYDROCHLORIDE 1 G/1
1 INJECTION, POWDER, LYOPHILIZED, FOR SOLUTION INTRAVENOUS
Qty: 1 | Refills: 0 | Status: COMPLETED | OUTPATIENT
Start: 2021-03-03 | End: 2021-03-11

## 2021-03-11 RX ORDER — HYOSCYAMINE SULFATE 0.12 MG/1
0.12 TABLET ORAL
Refills: 0 | Status: COMPLETED | OUTPATIENT
Start: 2021-03-11

## 2021-03-11 RX ADMIN — GEMCITABINE 1 GM: 1 INJECTION, POWDER, LYOPHILIZED, FOR SOLUTION INTRAVENOUS at 00:00

## 2021-03-11 RX ADMIN — Medication 1 MG: at 00:00

## 2021-03-12 DIAGNOSIS — C67.8 MALIGNANT NEOPLASM OF OVERLAPPING SITES OF BLADDER: ICD-10-CM

## 2021-03-18 DIAGNOSIS — C67.8 MALIGNANT NEOPLASM OF OVERLAPPING SITES OF BLADDER: ICD-10-CM

## 2021-03-24 ENCOUNTER — APPOINTMENT (OUTPATIENT)
Dept: CARDIOLOGY | Facility: CLINIC | Age: 76
End: 2021-03-24

## 2021-04-06 ENCOUNTER — APPOINTMENT (OUTPATIENT)
Dept: CARDIOLOGY | Facility: CLINIC | Age: 76
End: 2021-04-06
Payer: MEDICARE

## 2021-04-06 DIAGNOSIS — Z51.81 ENCOUNTER FOR THERAPEUTIC DRUG LVL MONITORING: ICD-10-CM

## 2021-04-06 DIAGNOSIS — Z79.899 ENCOUNTER FOR THERAPEUTIC DRUG LVL MONITORING: ICD-10-CM

## 2021-04-06 PROCEDURE — 99215 OFFICE O/P EST HI 40 MIN: CPT | Mod: 95

## 2021-04-06 RX ORDER — TRAVOPROST 0.04 MG/ML
0 SOLUTION OPHTHALMIC
Refills: 0 | Status: DISCONTINUED | COMMUNITY
End: 2021-04-06

## 2021-04-06 NOTE — PHYSICAL EXAM
[General Appearance - Well Developed] : well developed [Normal Appearance] : normal appearance [Well Groomed] : well groomed [General Appearance - Well Nourished] : well nourished [No Deformities] : no deformities [General Appearance - In No Acute Distress] : no acute distress [Normal Conjunctiva] : the conjunctiva exhibited no abnormalities [Eyelids - No Xanthelasma] : the eyelids demonstrated no xanthelasmas [Normal Jugular Venous A Waves Present] : normal jugular venous A waves present [Normal Jugular Venous V Waves Present] : normal jugular venous V waves present [No Jugular Venous Schmidt A Waves] : no jugular venous schmidt A waves [Heart Rate And Rhythm] : heart rate and rhythm were normal [Heart Sounds] : normal S1 and S2 [Murmurs] : no murmurs present [Abdomen Soft] : soft [Abdomen Tenderness] : non-tender [Abdomen Mass (___ Cm)] : no abdominal mass palpated [Abnormal Walk] : normal gait [Gait - Sufficient For Exercise Testing] : the gait was sufficient for exercise testing [Nail Clubbing] : no clubbing of the fingernails [Cyanosis, Localized] : no localized cyanosis [Petechial Hemorrhages (___cm)] : no petechial hemorrhages [Skin Color & Pigmentation] : normal skin color and pigmentation [] : no rash [No Venous Stasis] : no venous stasis [Skin Lesions] : no skin lesions [No Skin Ulcers] : no skin ulcer [No Xanthoma] : no  xanthoma was observed [Oriented To Time, Place, And Person] : oriented to person, place, and time [Affect] : the affect was normal [Mood] : the mood was normal [No Anxiety] : not feeling anxious

## 2021-04-07 ENCOUNTER — RX RENEWAL (OUTPATIENT)
Age: 76
End: 2021-04-07

## 2021-04-07 NOTE — CARDIOLOGY SUMMARY
[___] : [unfilled] [LVEF ___%] : LVEF [unfilled]% [Normal] : normal LA size [None] : no mitral regurgitation [de-identified] : 26 days event monitor: No significant arrhtyhmias. one episopde of SVT. and PACs and PVCsx. [de-identified] : caroptid Duplex 2/2021 :  mnoderate plaque. no stenosis.

## 2021-04-07 NOTE — HISTORY OF PRESENT ILLNESS
[Home] : at home, [unfilled] , at the time of the visit. [Medical Office: ([facility])___] : at the medical office located in  [Family Member] : family member [Verbal consent obtained from patient] : the patient, [unfilled] [FreeTextEntry4] : JESSI Brown [FreeTextEntry1] : Pre-operative cardiovascular risk evaluation and management \par \par \par HPI for today: : he had the bladder tumor removed in jan ,. and had seed implantation in the bladder. he did well with the procedure. \par toelrated procedure well. no cardiac complicatiohns. \par \par \par old noteL: This is a 75 year old male with history of prior stroke, CKD, unclear cause, and prior prostate CA, recently diagnosed with  bladder mass.\par Planning for cystoscopy and biospy\par denies any symptoms. no chest pain. . no dyspnea.

## 2021-04-07 NOTE — DISCUSSION/SUMMARY
[Patient] : the patient [Risks] : risks [Benefits] : benefits [Alternatives] : alternatives [___ Month(s)] : [unfilled] month(s) [FreeTextEntry1] : This is a 75 yea rold male with CKD, and stroke, prostate cancer in the past, now with bladder mass here for cystoscopy and bladder biopsy\par \par 1)  Pre-operative cardiovascular risk evaluation and management :  tolerated surgery well., no cardiac complications. tolerated surgery well. \par 2) stroke:moderate carotid plaque. aspirin. statins. \par 3) cardiac prevention: nstress test : unremarkable \par 4) syncope:  unremarkable vmonitor.  unremarkable echo. normal stress test. \par 5)  hypertension : ct losartan and norvasc.  : : salt restruiction. diet.  reevaluate next visit. \par az\par

## 2021-04-09 ENCOUNTER — RX RENEWAL (OUTPATIENT)
Age: 76
End: 2021-04-09

## 2021-04-13 ENCOUNTER — RX RENEWAL (OUTPATIENT)
Age: 76
End: 2021-04-13

## 2021-04-22 ENCOUNTER — APPOINTMENT (OUTPATIENT)
Dept: UROLOGY | Facility: CLINIC | Age: 76
End: 2021-04-22

## 2021-05-03 ENCOUNTER — RX RENEWAL (OUTPATIENT)
Age: 76
End: 2021-05-03

## 2021-05-05 ENCOUNTER — APPOINTMENT (OUTPATIENT)
Dept: INTERNAL MEDICINE | Facility: CLINIC | Age: 76
End: 2021-05-05
Payer: MEDICARE

## 2021-05-05 VITALS
HEIGHT: 71 IN | WEIGHT: 189 LBS | RESPIRATION RATE: 14 BRPM | HEART RATE: 67 BPM | SYSTOLIC BLOOD PRESSURE: 118 MMHG | BODY MASS INDEX: 26.46 KG/M2 | DIASTOLIC BLOOD PRESSURE: 68 MMHG | TEMPERATURE: 97.5 F | OXYGEN SATURATION: 95 %

## 2021-05-05 DIAGNOSIS — R41.3 OTHER AMNESIA: ICD-10-CM

## 2021-05-05 PROCEDURE — 99072 ADDL SUPL MATRL&STAF TM PHE: CPT

## 2021-05-05 PROCEDURE — 99214 OFFICE O/P EST MOD 30 MIN: CPT

## 2021-05-05 NOTE — HISTORY OF PRESENT ILLNESS
[FreeTextEntry1] : gait instability\par HTN [de-identified] : -PMH: H/o CVA (9/2019). HTN, HLD, H/o Prostate CA (Dx 2013), Gait Instability \par -SH: He lives at home w/ his daughter. Independent of all ADLs/IALDs. Denies difficulties climbing stairs. No grab bar or chair in shower. \par \par LEOLA is a 75 year M whom is here today for f/u after recent surgery. Per pt, tolerated well. He recently completed ChemoTx bladder infusion therapies in March and tolerated therapy well. He will be seeing Uro tomorrow and plan is for a cystoscopy tomorrow. \par he is accompanied by his daughter Patti today\par Today, pt reports feeling well and is w/o complaints. \par He does mention h/o recurrent syncopal episodes last witnessed episode 7/2020. Has never seen cardio. \par \par -H/o CVA 9/2019 w/ no residual deficits: Remains on Aspirin 81mg QD & Atorvastatin 40mg HS. Follows w/ Neuro. No reported changes.\par -HTN: Remains on Losartan 25mg QD & Amodipine 5mg QD. Follows w/ Cardio. No reported changes. \par -HLD: Remains on Lipitor 40mg HS. No reported changes.

## 2021-05-05 NOTE — ASSESSMENT
[FreeTextEntry1] : -PMH: H/o CVA (9/2019). HTN, HLD, H/o Prostate CA (Dx 2013), Gait Instability \par -SH: He lives at home w/ his daughter. Independent of all ADLs/IALDs. Denies difficulties climbing stairs. No grab bar or chair in shower. \par \par LEOLA is a 75 year M whom is here today for f/u after recent surgery. Per pt, tolerated well. He recently \par \par Specialists Involved:\par -Prior PMD: Dr. Schafer\par -Neuro: Dr. Greenwood\par -Nephro: Dr. Rob Stone ([phone])\par -Cardio: Dr. Crystal\par \par -RTO 3mo or sooner if needed

## 2021-05-06 ENCOUNTER — APPOINTMENT (OUTPATIENT)
Dept: UROLOGY | Facility: CLINIC | Age: 76
End: 2021-05-06
Payer: MEDICARE

## 2021-05-06 ENCOUNTER — OUTPATIENT (OUTPATIENT)
Dept: OUTPATIENT SERVICES | Facility: HOSPITAL | Age: 76
LOS: 1 days | End: 2021-05-06
Payer: MEDICARE

## 2021-05-06 VITALS
SYSTOLIC BLOOD PRESSURE: 121 MMHG | TEMPERATURE: 97 F | RESPIRATION RATE: 15 BRPM | HEART RATE: 67 BPM | DIASTOLIC BLOOD PRESSURE: 75 MMHG

## 2021-05-06 DIAGNOSIS — C67.8 MALIGNANT NEOPLASM OF OVERLAPPING SITES OF BLADDER: ICD-10-CM

## 2021-05-06 DIAGNOSIS — Z98.890 OTHER SPECIFIED POSTPROCEDURAL STATES: Chronic | ICD-10-CM

## 2021-05-06 DIAGNOSIS — Z87.828 PERSONAL HISTORY OF OTHER (HEALED) PHYSICAL INJURY AND TRAUMA: Chronic | ICD-10-CM

## 2021-05-06 DIAGNOSIS — Z98.49 CATARACT EXTRACTION STATUS, UNSPECIFIED EYE: Chronic | ICD-10-CM

## 2021-05-06 DIAGNOSIS — R35.0 FREQUENCY OF MICTURITION: ICD-10-CM

## 2021-05-06 PROCEDURE — 52000 CYSTOURETHROSCOPY: CPT

## 2021-05-08 LAB — URINE CYTOLOGY: NORMAL

## 2021-05-20 ENCOUNTER — RX RENEWAL (OUTPATIENT)
Age: 76
End: 2021-05-20

## 2021-06-02 ENCOUNTER — EMERGENCY (EMERGENCY)
Facility: HOSPITAL | Age: 76
LOS: 1 days | Discharge: HOME | End: 2021-06-02
Attending: EMERGENCY MEDICINE
Payer: MEDICARE

## 2021-06-02 VITALS
HEART RATE: 69 BPM | SYSTOLIC BLOOD PRESSURE: 130 MMHG | HEIGHT: 70 IN | RESPIRATION RATE: 18 BRPM | DIASTOLIC BLOOD PRESSURE: 64 MMHG | WEIGHT: 179.9 LBS | OXYGEN SATURATION: 98 % | TEMPERATURE: 98 F

## 2021-06-02 DIAGNOSIS — Z87.828 PERSONAL HISTORY OF OTHER (HEALED) PHYSICAL INJURY AND TRAUMA: Chronic | ICD-10-CM

## 2021-06-02 DIAGNOSIS — Z98.49 CATARACT EXTRACTION STATUS, UNSPECIFIED EYE: Chronic | ICD-10-CM

## 2021-06-02 DIAGNOSIS — Z98.890 OTHER SPECIFIED POSTPROCEDURAL STATES: Chronic | ICD-10-CM

## 2021-06-02 LAB
ALBUMIN SERPL ELPH-MCNC: 4 G/DL — SIGNIFICANT CHANGE UP (ref 3.3–5.2)
ALP SERPL-CCNC: 73 U/L — SIGNIFICANT CHANGE UP (ref 40–120)
ALT FLD-CCNC: 22 U/L — SIGNIFICANT CHANGE UP
ANION GAP SERPL CALC-SCNC: 12 MMOL/L — SIGNIFICANT CHANGE UP (ref 5–17)
AST SERPL-CCNC: 23 U/L — SIGNIFICANT CHANGE UP
BASOPHILS # BLD AUTO: 0.03 K/UL — SIGNIFICANT CHANGE UP (ref 0–0.2)
BASOPHILS NFR BLD AUTO: 0.5 % — SIGNIFICANT CHANGE UP (ref 0–2)
BILIRUB SERPL-MCNC: 0.7 MG/DL — SIGNIFICANT CHANGE UP (ref 0.4–2)
BUN SERPL-MCNC: 24.2 MG/DL — HIGH (ref 8–20)
CALCIUM SERPL-MCNC: 8.9 MG/DL — SIGNIFICANT CHANGE UP (ref 8.6–10.2)
CHLORIDE SERPL-SCNC: 105 MMOL/L — SIGNIFICANT CHANGE UP (ref 98–107)
CO2 SERPL-SCNC: 24 MMOL/L — SIGNIFICANT CHANGE UP (ref 22–29)
CREAT SERPL-MCNC: 1.92 MG/DL — HIGH (ref 0.5–1.3)
EOSINOPHIL # BLD AUTO: 0.07 K/UL — SIGNIFICANT CHANGE UP (ref 0–0.5)
EOSINOPHIL NFR BLD AUTO: 1.2 % — SIGNIFICANT CHANGE UP (ref 0–6)
GLUCOSE SERPL-MCNC: 109 MG/DL — HIGH (ref 70–99)
HCT VFR BLD CALC: 45.8 % — SIGNIFICANT CHANGE UP (ref 39–50)
HGB BLD-MCNC: 14.6 G/DL — SIGNIFICANT CHANGE UP (ref 13–17)
IMM GRANULOCYTES NFR BLD AUTO: 0.2 % — SIGNIFICANT CHANGE UP (ref 0–1.5)
LYMPHOCYTES # BLD AUTO: 1.09 K/UL — SIGNIFICANT CHANGE UP (ref 1–3.3)
LYMPHOCYTES # BLD AUTO: 18.6 % — SIGNIFICANT CHANGE UP (ref 13–44)
MAGNESIUM SERPL-MCNC: 2.2 MG/DL — SIGNIFICANT CHANGE UP (ref 1.6–2.6)
MCHC RBC-ENTMCNC: 29.7 PG — SIGNIFICANT CHANGE UP (ref 27–34)
MCHC RBC-ENTMCNC: 31.9 GM/DL — LOW (ref 32–36)
MCV RBC AUTO: 93.1 FL — SIGNIFICANT CHANGE UP (ref 80–100)
MONOCYTES # BLD AUTO: 0.55 K/UL — SIGNIFICANT CHANGE UP (ref 0–0.9)
MONOCYTES NFR BLD AUTO: 9.4 % — SIGNIFICANT CHANGE UP (ref 2–14)
NEUTROPHILS # BLD AUTO: 4.11 K/UL — SIGNIFICANT CHANGE UP (ref 1.8–7.4)
NEUTROPHILS NFR BLD AUTO: 70.1 % — SIGNIFICANT CHANGE UP (ref 43–77)
PLATELET # BLD AUTO: 188 K/UL — SIGNIFICANT CHANGE UP (ref 150–400)
POTASSIUM SERPL-MCNC: 5.3 MMOL/L — SIGNIFICANT CHANGE UP (ref 3.5–5.3)
POTASSIUM SERPL-SCNC: 5.3 MMOL/L — SIGNIFICANT CHANGE UP (ref 3.5–5.3)
PROT SERPL-MCNC: 7.2 G/DL — SIGNIFICANT CHANGE UP (ref 6.6–8.7)
RBC # BLD: 4.92 M/UL — SIGNIFICANT CHANGE UP (ref 4.2–5.8)
RBC # FLD: 13.1 % — SIGNIFICANT CHANGE UP (ref 10.3–14.5)
SARS-COV-2 RNA SPEC QL NAA+PROBE: SIGNIFICANT CHANGE UP
SODIUM SERPL-SCNC: 141 MMOL/L — SIGNIFICANT CHANGE UP (ref 135–145)
TROPONIN T SERPL-MCNC: <0.01 NG/ML — SIGNIFICANT CHANGE UP (ref 0–0.06)
TROPONIN T SERPL-MCNC: <0.01 NG/ML — SIGNIFICANT CHANGE UP (ref 0–0.06)
WBC # BLD: 5.86 K/UL — SIGNIFICANT CHANGE UP (ref 3.8–10.5)
WBC # FLD AUTO: 5.86 K/UL — SIGNIFICANT CHANGE UP (ref 3.8–10.5)

## 2021-06-02 PROCEDURE — 70450 CT HEAD/BRAIN W/O DYE: CPT | Mod: 26,MA

## 2021-06-02 PROCEDURE — 99220: CPT

## 2021-06-02 PROCEDURE — 93010 ELECTROCARDIOGRAM REPORT: CPT

## 2021-06-02 RX ORDER — ASPIRIN/CALCIUM CARB/MAGNESIUM 324 MG
81 TABLET ORAL DAILY
Refills: 0 | Status: DISCONTINUED | OUTPATIENT
Start: 2021-06-02 | End: 2021-06-07

## 2021-06-02 RX ORDER — AMLODIPINE BESYLATE 2.5 MG/1
5 TABLET ORAL DAILY
Refills: 0 | Status: DISCONTINUED | OUTPATIENT
Start: 2021-06-02 | End: 2021-06-07

## 2021-06-02 RX ORDER — LOSARTAN POTASSIUM 100 MG/1
25 TABLET, FILM COATED ORAL DAILY
Refills: 0 | Status: DISCONTINUED | OUTPATIENT
Start: 2021-06-02 | End: 2021-06-07

## 2021-06-02 RX ORDER — ATORVASTATIN CALCIUM 80 MG/1
40 TABLET, FILM COATED ORAL AT BEDTIME
Refills: 0 | Status: DISCONTINUED | OUTPATIENT
Start: 2021-06-02 | End: 2021-06-07

## 2021-06-02 RX ADMIN — Medication 81 MILLIGRAM(S): at 21:30

## 2021-06-02 RX ADMIN — LOSARTAN POTASSIUM 25 MILLIGRAM(S): 100 TABLET, FILM COATED ORAL at 21:30

## 2021-06-02 RX ADMIN — ATORVASTATIN CALCIUM 40 MILLIGRAM(S): 80 TABLET, FILM COATED ORAL at 21:30

## 2021-06-02 RX ADMIN — AMLODIPINE BESYLATE 5 MILLIGRAM(S): 2.5 TABLET ORAL at 21:30

## 2021-06-02 NOTE — ED ADULT NURSE REASSESSMENT NOTE - NSIMPLEMENTINTERV_GEN_ALL_ED
Implemented All Fall Risk Interventions:  [place] to call system. Call bell, personal items and telephone within reach. Instruct patient to call for assistance. Room bathroom lighting operational. Non-slip footwear when patient is off stretcher. Physically safe environment: no spills, clutter or unnecessary equipment. Stretcher in lowest position, wheels locked, appropriate side rails in place. Provide visual cue, wrist band, yellow gown, etc. Monitor gait and stability. Monitor for mental status changes and reorient to person, place, and time. Review medications for side effects contributing to fall risk. Reinforce activity limits and safety measures with patient and family.

## 2021-06-02 NOTE — ED CDU PROVIDER INITIAL DAY NOTE - FAMILY HISTORY
FH: heart disease     Mother  Still living? Unknown  FH: Alzheimers disease, Age at diagnosis: Age Unknown

## 2021-06-02 NOTE — ED PROVIDER NOTE - OBJECTIVE STATEMENT
Pt. present to ED after an episode of feeling dizziness today. Pt. states that he came out of the shower and felt lightheaded. Pt. then fell down and sat on the floor. No head trauma. NO chest pain. Pt. denies passing out. Pt. states that he did not eat today. Episode occurred around. Pt. present to ED after an episode of feeling dizziness today. Pt. states that he came out of the shower and felt lightheaded. Pt. then fell down and sat on the floor. No head trauma. NO chest pain. Pt. denies passing out. Pt. states that he did not eat today. Episode occurred around 1pm this afternoon. Pt. states that he did not eat today, for no specific reason. Pt. denies any chest pain or abdominal pain. Pt. states that he feels better now.

## 2021-06-02 NOTE — ED PROVIDER NOTE - CLINICAL SUMMARY MEDICAL DECISION MAKING FREE TEXT BOX
Pt. felt dizzy today after taking a shower. NO lOC. NO head trauma. Pt. with no complaints at this time. Will check labs/Head CT and re-assess.

## 2021-06-02 NOTE — ED ADULT NURSE NOTE - NSIMPLEMENTINTERV_GEN_ALL_ED
Implemented All Fall Risk Interventions:  [place] to call system. Call bell, personal items and telephone within reach. Instruct patient to call for assistance. Room bathroom lighting operational. Non-slip footwear when patient is off stretcher. Physically safe environment: no spills, clutter or unnecessary equipment. Stretcher in lowest position, wheels locked, appropriate side rails in place. Provide visual cue, wrist band, yellow gown, etc. Monitor gait and stability. Monitor for mental status changes and reorient to person, place, and time. Review medications for side effects contributing to fall risk. Reinforce activity limits and safety measures with patient and family.

## 2021-06-02 NOTE — ED PROVIDER NOTE - PMH
Chronic kidney disease (CKD)  stage 3  CVA (cerebrovascular accident)    Dementia    Glaucoma    Gross hematuria    H/O cataract    HLD (hyperlipidemia)    [place] (hard of hearing)    HTN (hypertension)    Prostate cancer    Syncope  several episodes origin [facility]

## 2021-06-02 NOTE — ED ADULT TRIAGE NOTE - CHIEF COMPLAINT QUOTE
Pt BIBA from home, had syncopal episode while in shower w/ LOC, denies hitting head, denies blood thinners, as per family, pt has been having syncopal episodes lately, initially hypotensive upon EMS arrival

## 2021-06-02 NOTE — ED PROVIDER NOTE - PROGRESS NOTE DETAILS
Spoke to the daughter(Yris [phone]). Daughter heard a lot noise. PT. was found lying outside of the bathroom. PT. was awake. Pt. was unsteady when he was walking with his daughter and wife. Pt. latter while sitting on the top of the stairs became very stiff(arms extended) and he was "shaking" and was gazing to his left with his eye.  Pt. was not responding to her family during the episode. Daughter then called 911. This lasted about 1 minute. AFter the episode, pt. did not appear to be confused. NO hx of seizures. PT. follows with Dr. Greenwood-neurology due to CVA in 2018. Pt. admitted to observation unit for syncope.

## 2021-06-02 NOTE — ED CDU PROVIDER INITIAL DAY NOTE - ATTENDING CONTRIBUTION TO CARE
I, Lane Morales, performed a face to face bedside interview with this patient regarding history of present illness, review of symptoms and relevant past medical, social and family history.  I completed an independent physical examination. I have communicated the patient’s plan of care and disposition with the ACP.  75 year old male with PMH CVA with no residual deficits, dementia, CKD presents with syncope. The pt states that he did not eat for 1 day. he got out of the shower, started ot feel lightheaded. As oper the family, they dound him on the floor, unconscious. They state that he was stiff, but no convulsions, no post-ictal period, and was unconscious for less than a minute. No chest pain, SOB, palpitations.   Gen: NAD, well appearing  CV: RRR  Pul: CTA b/l  Abd: Soft, non-distended, non-tender  Neuro: no focal deficits  Pt place don obs for syncope eval

## 2021-06-02 NOTE — ED ADULT NURSE NOTE - OBJECTIVE STATEMENT
75 yom presents to ed s/p syncopal episode while in shower. + loc, with frequent syncopal episodes denie shitting head denies blood thinners. no neurodefecit. vss

## 2021-06-02 NOTE — ED CDU PROVIDER INITIAL DAY NOTE - OBJECTIVE STATEMENT
Pt. present to ED after an episode of feeling dizziness today. Pt. states that he came out of the shower and felt lightheaded. Pt. then fell down and sat on the floor. No head trauma. NO chest pain. Pt. denies passing out. Pt. states that he did not eat today. Episode occurred around 1pm this afternoon. Pt. states that he did not eat today, for no specific reason. Pt. denies any chest pain or abdominal pain. Pt. states that he feels better now.    As per ED attending "Spoke to the daughter(Yris [phone]). Daughter heard a lot noise. PT. was found lying outside of the bathroom. PT. was awake. Pt. was unsteady when he was walking with his daughter and wife. Pt. latter while sitting on the top of the stairs became very stiff(arms extended) and he was "shaking" and was gazing to his left with his eye.  Pt. was not responding to her family during the episode. Daughter then called 911. This lasted about 1 minute. AFter the episode, pt. did not appear to be confused. NO hx of seizures. "

## 2021-06-02 NOTE — ED CDU PROVIDER INITIAL DAY NOTE - PMH
Chronic kidney disease (CKD)  stage 3  CVA (cerebrovascular accident)    Dementia    Glaucoma    Gross hematuria    H/O cataract    HLD (hyperlipidemia)    [place] (hard of hearing)    HTN (hypertension)    Prostate cancer    Syncope  several episodes origin [facility]

## 2021-06-02 NOTE — ED ADULT NURSE NOTE - PMH
Chronic kidney disease (CKD)  stage 3  CVA (cerebrovascular accident)    Dementia    Glaucoma    Gross hematuria    H/O cataract    HLD (hyperlipidemia)    [place] (hard of hearing)    HTN (hypertension)    Prostate cancer    Syncope  several episodes origin [facility]

## 2021-06-03 VITALS
SYSTOLIC BLOOD PRESSURE: 149 MMHG | OXYGEN SATURATION: 98 % | DIASTOLIC BLOOD PRESSURE: 81 MMHG | TEMPERATURE: 98 F | RESPIRATION RATE: 18 BRPM | HEART RATE: 92 BPM

## 2021-06-03 DIAGNOSIS — E78.5 HYPERLIPIDEMIA, UNSPECIFIED: ICD-10-CM

## 2021-06-03 DIAGNOSIS — I63.9 CEREBRAL INFARCTION, UNSPECIFIED: ICD-10-CM

## 2021-06-03 DIAGNOSIS — I10 ESSENTIAL (PRIMARY) HYPERTENSION: ICD-10-CM

## 2021-06-03 DIAGNOSIS — R55 SYNCOPE AND COLLAPSE: ICD-10-CM

## 2021-06-03 LAB
HCT VFR BLD CALC: 46.4 % — SIGNIFICANT CHANGE UP (ref 39–50)
HGB BLD-MCNC: 14.6 G/DL — SIGNIFICANT CHANGE UP (ref 13–17)
MCHC RBC-ENTMCNC: 29.6 PG — SIGNIFICANT CHANGE UP (ref 27–34)
MCHC RBC-ENTMCNC: 31.5 GM/DL — LOW (ref 32–36)
MCV RBC AUTO: 94.1 FL — SIGNIFICANT CHANGE UP (ref 80–100)
PLATELET # BLD AUTO: 175 K/UL — SIGNIFICANT CHANGE UP (ref 150–400)
RBC # BLD: 4.93 M/UL — SIGNIFICANT CHANGE UP (ref 4.2–5.8)
RBC # FLD: 13 % — SIGNIFICANT CHANGE UP (ref 10.3–14.5)
TROPONIN T SERPL-MCNC: <0.01 NG/ML — SIGNIFICANT CHANGE UP (ref 0–0.06)
WBC # BLD: 7.15 K/UL — SIGNIFICANT CHANGE UP (ref 3.8–10.5)
WBC # FLD AUTO: 7.15 K/UL — SIGNIFICANT CHANGE UP (ref 3.8–10.5)

## 2021-06-03 PROCEDURE — U0003: CPT

## 2021-06-03 PROCEDURE — 70450 CT HEAD/BRAIN W/O DYE: CPT

## 2021-06-03 PROCEDURE — 85027 COMPLETE CBC AUTOMATED: CPT

## 2021-06-03 PROCEDURE — G0378: CPT

## 2021-06-03 PROCEDURE — 99217: CPT

## 2021-06-03 PROCEDURE — 83735 ASSAY OF MAGNESIUM: CPT

## 2021-06-03 PROCEDURE — 80053 COMPREHEN METABOLIC PANEL: CPT

## 2021-06-03 PROCEDURE — 85025 COMPLETE CBC W/AUTO DIFF WBC: CPT

## 2021-06-03 PROCEDURE — 84484 ASSAY OF TROPONIN QUANT: CPT

## 2021-06-03 PROCEDURE — U0005: CPT

## 2021-06-03 PROCEDURE — 36415 COLL VENOUS BLD VENIPUNCTURE: CPT

## 2021-06-03 PROCEDURE — 99285 EMERGENCY DEPT VISIT HI MDM: CPT

## 2021-06-03 PROCEDURE — 93005 ELECTROCARDIOGRAM TRACING: CPT

## 2021-06-03 PROCEDURE — 99284 EMERGENCY DEPT VISIT MOD MDM: CPT | Mod: 25

## 2021-06-03 RX ADMIN — Medication 81 MILLIGRAM(S): at 11:17

## 2021-06-03 NOTE — ED CDU PROVIDER SUBSEQUENT DAY NOTE - PMH
Chronic kidney disease (CKD)  stage 3  CVA (cerebrovascular accident)    Dementia    Glaucoma    Gross hematuria    H/O cataract    HLD (hyperlipidemia)    [place] (hard of hearing)    HTN (hypertension)    Prostate cancer    Syncope  several episodes origin [facility]

## 2021-06-03 NOTE — ED CDU PROVIDER SUBSEQUENT DAY NOTE - ATTENDING CONTRIBUTION TO CARE
No events overnight,  states feels well.  Pending cardiology consult this am.  Likely dc.  pt with prodrome, likely vasovagal syncope     patient had echo and stress in february 2021, no acute pathology, us of carotids show "Moderate atherosclerosis with no evidence of hemodynamically significant stenosis bilaterally."

## 2021-06-03 NOTE — ED CDU PROVIDER DISPOSITION NOTE - CARE PROVIDER_API CALL
Carmella Crystal)  Cardiology; Internal Medicine  [address] [place] [address], [address] [id number]  Phone: [phone]  Fax: [phone]  Follow Up Time:

## 2021-06-03 NOTE — ED CDU PROVIDER SUBSEQUENT DAY NOTE - HISTORY
Patient without acute events overnight, back at baseline, a&o x 4, upon chart review patient had echo and stress in february 2021, no acute pathology, us of carotids show "Moderate atherosclerosis with no evidence of hemodynamically significant stenosis bilaterally." Pending cardio consult

## 2021-06-03 NOTE — CONSULT NOTE ADULT - PROBLEM SELECTOR RECOMMENDATION 9
-likely vasovagal  -telemetry overnight  -orthostatic vital signs  -recent event monitor did not show any significant arrhythmias  -recent cardiac workup unremarkable  -encourage proper hydration and nutrition  -follow up outpatient with Dr. Crystal

## 2021-06-03 NOTE — ED CDU PROVIDER DISPOSITION NOTE - ATTENDING CONTRIBUTION TO CARE
Pt with syncope, has been stable in the ED, states walking back and forth without pain, palps, syncope.  Plan dc fu with outpt cards.

## 2021-06-03 NOTE — CONSULT NOTE ADULT - ASSESSMENT
This is 74 y/o male with hx of HTN, HLD, CVA (2018, no residuals), CKD 3, dementia, prior syncopal episodes, bladder tumor s/p resection who presents with syncope. Pt states he felt dizzy and unsteady on his feet after getting out of the shower and fell to the floor. Pt insists he never lost consciousness but felt very stiff throughout the episode. Pt recalls he didn't eat anything that day. Pt underwent recent cardiac workup (Feb 2021) including [place] stress echo (no ischemia), TTE (normal LV function, no significant valvular abnormalities) and wore 26 day event monitor (no significant arrhythmias, 1 episode of SVT and occasional PACs/PVCs). Carotid duplex revealed moderate plaque, no stenosis. EKG shows SR with nonspecific T-wave abnormality. Troponin negative x3. Head CT shows old infarcts, atrophy and white matter ischemia. Vital signs stable. Pt follows with Dr. Crystal.
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
## 2021-06-03 NOTE — ED CDU PROVIDER DISPOSITION NOTE - CLINICAL COURSE
Pt. present to ED after an episode of feeling dizziness today. Pt. states that he came out of the shower and felt lightheaded. Pt. then fell down and sat on the floor. No head trauma. NO chest pain. Pt. denies passing out. Pt. states that he did not eat today. Episode occurred around 1pm this afternoon. Pt. states that he did not eat today, for no specific reason. Pt. denies any chest pain or abdominal pain. Pt. states that he feels better now.  As per ED attending "Spoke to the daughter(Yris [phone]). Daughter heard a lot noise. PT. was found lying outside of the bathroom. PT. was awake. Pt. was unsteady when he was walking with his daughter and wife. Pt. latter while sitting on the top of the stairs became very stiff(arms extended) and he was "shaking" and was gazing to his left with his eye.  Pt. was not responding to her family during the episode. Daughter then called 911. This lasted about 1 minute. AFter the episode, pt. did not appear to be confused. NO hx of seizures.  Labs grossly unremarkable, CKD stable, CT with old infarcts.  Seen by cardiology, LOOP recorder no events, orthostatic BP negative, cleared to be d/c home with f/u Sr Moreno.

## 2021-06-03 NOTE — ED ADULT NURSE REASSESSMENT NOTE - NS ED NURSE REASSESS COMMENT FT1
Patient calm and cooperative, offers no complaints at this time aside from hunger. [place] provided. Stood up at the edge of the bed to urinate without incident or episode of dizziness. Pending 2nd troponin and disposition. CCM in place. Safety maintained, needs attended, will monitor.
Patient ok to eat. Diet office called, breakfast tray ordered.
Pt resting comfortably in bed VSS, no complaints for RN will continue to monitor.
Pt resting comfortably throughout the night no complaints for RN VS, will continue to monitor.
Assumed care of the Pt at 2300. Verbal report received from LLOYD mejia. Pt is Aox4 in no acute distress. Pt Denies any chest pain, shortness of breath, nausea or dizziness at this time, VSS, PIV patent. Pt NSR on CM as per monitor tech. Pt given call bell, call bell within reach, Pt instructed to use call bell when assistance is needed. Pt pending repeat trop/ekg. Plan of care explained, wait time explained, Pt in understanding of plan of care will continue to monitor.
Assumed care of the patient at 0730. Verbal report received from Yoandy DESAI Obs. Patient A&Ox4. No s/s of acute distress. Denies any CP/SOB or dizziness. NSR on CM. Pending Cardiology consult this am. Patient in understanding of plan of care. Patient with no further questions for the RN. Resting in comfort. Call bell within reach and encouraged to use when assistance needed. Will continue to monitor.

## 2021-06-03 NOTE — ED ADULT NURSE REASSESSMENT NOTE - COMFORT CARE
ambulated to bathroom/plan of care explained/repositioned/wait time explained
plan of care explained/side rails up/wait time explained

## 2021-06-03 NOTE — ED CDU PROVIDER DISPOSITION NOTE - PATIENT PORTAL LINK FT
You can access the FollowMyHealth Patient Portal offered by [facility] by registering at the following website: [email or web address]. By joining [facility]’s FollowMyHealth portal, you will also be able to view your health information using other applications (apps) compatible with our system.

## 2021-06-03 NOTE — CONSULT NOTE ADULT - ATTENDING COMMENTS
Pt is seen, examined, chart reviewed, d/w np/pa.  Management as outlined above.  likely vasovagal  Recent outpatient cardiac workup unrevealing  Avooid dehydration, encourage proper hydration and nutrition  follow up outpatient with Dr. Crystal

## 2021-06-04 ENCOUNTER — NON-APPOINTMENT (OUTPATIENT)
Age: 76
End: 2021-06-04

## 2021-06-07 ENCOUNTER — RX RENEWAL (OUTPATIENT)
Age: 76
End: 2021-06-07

## 2021-06-09 ENCOUNTER — APPOINTMENT (OUTPATIENT)
Dept: DISASTER EMERGENCY | Facility: OTHER | Age: 76
End: 2021-06-09
Payer: MEDICARE

## 2021-06-09 PROCEDURE — 0002A: CPT

## 2021-06-10 ENCOUNTER — APPOINTMENT (OUTPATIENT)
Dept: INTERNAL MEDICINE | Facility: CLINIC | Age: 76
End: 2021-06-10
Payer: MEDICARE

## 2021-06-10 VITALS
HEART RATE: 71 BPM | OXYGEN SATURATION: 97 % | RESPIRATION RATE: 18 BRPM | HEIGHT: 71 IN | DIASTOLIC BLOOD PRESSURE: 68 MMHG | BODY MASS INDEX: 26.46 KG/M2 | SYSTOLIC BLOOD PRESSURE: 126 MMHG | WEIGHT: 189 LBS | TEMPERATURE: 97.3 F

## 2021-06-10 PROCEDURE — 99072 ADDL SUPL MATRL&STAF TM PHE: CPT

## 2021-06-10 PROCEDURE — 99214 OFFICE O/P EST MOD 30 MIN: CPT

## 2021-06-10 NOTE — ASSESSMENT
[FreeTextEntry1] : -PMH: H/o CVA (9/2019). HTN, HLD, H/o Prostate CA (Dx 2013), Gait Instability \par -SH: He lives at home w/ his daughter. Independent of all ADLs/IALDs. Denies difficulties climbing stairs. No grab bar or chair in shower. \par \par LEOLA is a 75 year M whom is here today for f/u after recent surgery. Per pt, tolerated well. He recently \par \par Specialists Involved:\par -Prior PMD: Dr. Schafer\par -Neuro: Dr. Greenwood\par -Nephro: Dr. Rob Stone ([phone])\par -Cardio: Dr. Crystal\par \par -Decrease Amlodipine\par -Home PT advised given recent fall\par -RTO 2mo or sooner if needed

## 2021-06-10 NOTE — HISTORY OF PRESENT ILLNESS
[FreeTextEntry1] : recent ed visit [de-identified] : -PMH: H/o CVA (9/2019). HTN, HLD, H/o Prostate CA (Dx 2013), Gait Instability \par -SH: He lives at home w/ his daughter. Independent of all ADLs/IALDs. Denies difficulties climbing stairs. No grab bar or chair in shower. \par \par LEOLA is a 75 year M whom is here today for f/u after recent ed visit\par Patient seen at [facility] on [address] with complaints of dizziness and feeling unsteady after getting out of the shower and patient fell to floor. \par Pts son in law is here today and helps to provide some of the Hx today regarding ED visit. \par Apparently pts BP dropped very low when EMS arrived. Per Documentation, cardiology was consulted. It was felt that patient likely had any vasovagal episode. Patient placed on telemetry overnight. Head CT demonstrated old infarcts, atrophy and white matter ischemia. Patient discharged home and advised to followup with cardiology and neurology as outpatient. \par Pt has an upcoming appointment next week with Neuro. \par \par Today, pt reports feeling well and is w/o complaints. \par \par -H/o CVA 9/2019 w/ no residual deficits: Remains on Aspirin 81mg QD & Atorvastatin 40mg HS. Follows w/ Neuro. No reported changes.\par -HTN: Remains on Losartan 25mg QD & Amodipine 5mg QD. Follows w/ Cardio. No reported changes. \par -HLD: Remains on Lipitor 40mg HS. No reported changes.

## 2021-06-14 ENCOUNTER — APPOINTMENT (OUTPATIENT)
Dept: NEUROLOGY | Facility: CLINIC | Age: 76
End: 2021-06-14
Payer: MEDICARE

## 2021-06-14 VITALS
HEIGHT: 71 IN | WEIGHT: 190 LBS | BODY MASS INDEX: 26.6 KG/M2 | TEMPERATURE: 97.2 F | DIASTOLIC BLOOD PRESSURE: 70 MMHG | SYSTOLIC BLOOD PRESSURE: 130 MMHG

## 2021-06-14 PROCEDURE — 99072 ADDL SUPL MATRL&STAF TM PHE: CPT

## 2021-06-14 PROCEDURE — 99214 OFFICE O/P EST MOD 30 MIN: CPT

## 2021-06-14 NOTE — CONSULT LETTER
Detail Level: Detailed Add 40118 Cpt? (Important Note: In 2017 The Use Of 39902 Is Being Tracked By Cms To Determine Future Global Period Reimbursement For Global Periods): yes [Dear  ___] : Dear  [unfilled], [Courtesy Letter:] : I had the pleasure of seeing your patient, [unfilled], in my office today. [Please see my note below.] : Please see my note below. [Consult Closing:] : Thank you very much for allowing me to participate in the care of this patient.  If you have any questions, please do not hesitate to contact me. [Sincerely,] : Sincerely, [FreeTextEntry3] : Deny Greenwood M.D., Ph.D. DPN-N\par [facility]\par Neurology at [place]\par Medical Director of Stroke Services\par [facility]\par

## 2021-06-14 NOTE — HISTORY OF PRESENT ILLNESS
[FreeTextEntry1] : Initial office visit September 18, 2020:\par This is a 74-year-old man who presents today for neurologic evaluation. About a year ago he had a syncopal event and went to the hospital. An MRI was done and showed a left basal ganglia stroke. He followed with an outside neurologist. He's had 3 or 4 other episodes of syncope. These but I we have been in the shower. He does take medicine for hypertension and additional antihypertensive drug for renal protection. He is also at least once mistaken his wife's medication, labetalol as well. His daughter also reports some mild memory loss at times and some intermittent confusion. He is here today for neurologic evaluation.\par \par Followup December 22, 2020:\par This is a 75-year-old man who presents today for followup of stroke. He is accompanied by his son-in-law. He had a left basal ganglia stroke that was diagnosed about a year ago. He had had syncope in the past but does not report any new symptoms. Overall he is largely unchanged from his initial visit in September. He continues on medicine for hypertension and hyperlipidemia as well as aspirin. Per the chart and the patient he did have an episode of hematuria. A cystoscopy found a lesion in his bladder and per the son-in-law he is scheduled to have this operated on in January. He is here today for neurologic followup.\par \par Followup June 14, 2021:\par This is a 75-year-old man presenting for followup of stroke. He also had a syncopal event last week. He states he went to the hospital and workup was done which was negative. His son-in-law states that he was told that the patient's wife and daughter heard her foot from upstairs and when they came upstairs they saw him passed out and he had stiffened up. This lasted a minute or so. He slid down his stairs as he was a bit weak after this. He had a blood pressure of 90/50 per EMS. He is now doing better. Her guarding no history of stroke is stable. He is here today for neurologic followup.

## 2021-06-14 NOTE — ASSESSMENT
[FreeTextEntry1] : This is a 75-year-old man with history of basal ganglia stroke as well as recent syncope. Regarding the stroke he should continue to modify her risk factors and keep his blood pressure under normal control keep his LDL under 70 and continue antiplatelet agent. Regarding the recent event of syncope, because he stiffened up I would like to check an EEG to evaluate for seizure activity. I will see him back in the office in 6 months, sooner should the need arise but will call with results of the EEG was obtained and available.

## 2021-06-16 ENCOUNTER — APPOINTMENT (OUTPATIENT)
Dept: CARDIOLOGY | Facility: CLINIC | Age: 76
End: 2021-06-16
Payer: MEDICARE

## 2021-06-16 VITALS
BODY MASS INDEX: 25.76 KG/M2 | TEMPERATURE: 98.8 F | OXYGEN SATURATION: 98 % | SYSTOLIC BLOOD PRESSURE: 130 MMHG | DIASTOLIC BLOOD PRESSURE: 78 MMHG | HEIGHT: 71 IN | HEART RATE: 71 BPM | WEIGHT: 184 LBS

## 2021-06-16 DIAGNOSIS — R55 SYNCOPE AND COLLAPSE: ICD-10-CM

## 2021-06-16 DIAGNOSIS — R94.31 ABNORMAL ELECTROCARDIOGRAM [ECG] [EKG]: ICD-10-CM

## 2021-06-16 PROCEDURE — 99214 OFFICE O/P EST MOD 30 MIN: CPT

## 2021-06-16 PROCEDURE — 93000 ELECTROCARDIOGRAM COMPLETE: CPT

## 2021-07-01 ENCOUNTER — OUTPATIENT (OUTPATIENT)
Dept: OUTPATIENT SERVICES | Facility: HOSPITAL | Age: 76
LOS: 1 days | End: 2021-07-01
Payer: MEDICARE

## 2021-07-01 ENCOUNTER — APPOINTMENT (OUTPATIENT)
Dept: INTERNAL MEDICINE | Facility: CLINIC | Age: 76
End: 2021-07-01
Payer: MEDICARE

## 2021-07-01 ENCOUNTER — APPOINTMENT (OUTPATIENT)
Dept: ULTRASOUND IMAGING | Facility: CLINIC | Age: 76
End: 2021-07-01
Payer: MEDICARE

## 2021-07-01 ENCOUNTER — TRANSCRIPTION ENCOUNTER (OUTPATIENT)
Age: 76
End: 2021-07-01

## 2021-07-01 VITALS
HEIGHT: 71 IN | OXYGEN SATURATION: 98 % | TEMPERATURE: 97.4 F | WEIGHT: 190 LBS | SYSTOLIC BLOOD PRESSURE: 116 MMHG | BODY MASS INDEX: 26.6 KG/M2 | RESPIRATION RATE: 14 BRPM | HEART RATE: 80 BPM | DIASTOLIC BLOOD PRESSURE: 72 MMHG

## 2021-07-01 DIAGNOSIS — Z98.890 OTHER SPECIFIED POSTPROCEDURAL STATES: Chronic | ICD-10-CM

## 2021-07-01 DIAGNOSIS — R60.0 LOCALIZED EDEMA: ICD-10-CM

## 2021-07-01 DIAGNOSIS — Z98.49 CATARACT EXTRACTION STATUS, UNSPECIFIED EYE: Chronic | ICD-10-CM

## 2021-07-01 DIAGNOSIS — Z87.828 PERSONAL HISTORY OF OTHER (HEALED) PHYSICAL INJURY AND TRAUMA: Chronic | ICD-10-CM

## 2021-07-01 PROCEDURE — 93971 EXTREMITY STUDY: CPT | Mod: 26,RT

## 2021-07-01 PROCEDURE — 99072 ADDL SUPL MATRL&STAF TM PHE: CPT

## 2021-07-01 PROCEDURE — 93971 EXTREMITY STUDY: CPT

## 2021-07-01 PROCEDURE — 36415 COLL VENOUS BLD VENIPUNCTURE: CPT

## 2021-07-01 PROCEDURE — 99214 OFFICE O/P EST MOD 30 MIN: CPT | Mod: 25

## 2021-07-01 NOTE — PHYSICAL EXAM
[Normal] : no carotid or abdominal bruits heard, no varicosities, pedal pulses are present, no peripheral edema, no extremity clubbing or cyanosis and no palpable aorta [de-identified] : trace RLE Edema up to ankle

## 2021-07-01 NOTE — ASSESSMENT
[FreeTextEntry1] : At this time patient's vital signs and physical exam otherwise benign aside from trace edema of the right lower extremity which only extends up to the ankle. Going to check his renal function and electrolytes to ensure no deficiency there to otherwise explain his reported symptoms. Will also send patient for a right lower extremity venous Doppler. Further recommendations pending imaging and lab results.

## 2021-07-01 NOTE — HISTORY OF PRESENT ILLNESS
[FreeTextEntry8] : -PMH: H/o CVA (9/2019). HTN, HLD, H/o Prostate CA (Dx 2013), Gait Instability \par -SH: He lives at home w/ his daughter. Independent of all ADLs/IALDs. Denies difficulties climbing stairs. No grab bar or chair in shower. \par \ashley BHAGAT is a 75 year M whom is here today With complaint of bilateral lower extremity edema and right greater than left that has been ongoing for the past one week but was noted to be worse last night at the end of the day. This morning, the patient is accompanied by his son-in-law. The patient and family member at bedside swelling is almost resolved as of this morning. Today, patient reports feeling well and he denies any chest tightness, palpitations, shortness of breath, reduced urine output. He reports compliance with all medications. Denies any dietary changes or new over-the-counter supplements\par

## 2021-07-02 ENCOUNTER — NON-APPOINTMENT (OUTPATIENT)
Age: 76
End: 2021-07-02

## 2021-07-02 LAB
ANION GAP SERPL CALC-SCNC: 13 MMOL/L
BUN SERPL-MCNC: 27 MG/DL
CALCIUM SERPL-MCNC: 9.5 MG/DL
CHLORIDE SERPL-SCNC: 105 MMOL/L
CO2 SERPL-SCNC: 25 MMOL/L
CREAT SERPL-MCNC: 2.21 MG/DL
GLUCOSE SERPL-MCNC: 156 MG/DL
POTASSIUM SERPL-SCNC: 4.1 MMOL/L
SODIUM SERPL-SCNC: 143 MMOL/L

## 2021-07-08 ENCOUNTER — APPOINTMENT (OUTPATIENT)
Dept: NEUROLOGY | Facility: CLINIC | Age: 76
End: 2021-07-08
Payer: MEDICARE

## 2021-07-08 PROCEDURE — 93040 RHYTHM ECG WITH REPORT: CPT

## 2021-07-08 PROCEDURE — 95819 EEG AWAKE AND ASLEEP: CPT

## 2021-07-08 PROCEDURE — 99072 ADDL SUPL MATRL&STAF TM PHE: CPT

## 2021-07-09 ENCOUNTER — NON-APPOINTMENT (OUTPATIENT)
Age: 76
End: 2021-07-09

## 2021-07-14 ENCOUNTER — APPOINTMENT (OUTPATIENT)
Dept: NEPHROLOGY | Facility: CLINIC | Age: 76
End: 2021-07-14
Payer: MEDICARE

## 2021-07-14 VITALS
DIASTOLIC BLOOD PRESSURE: 70 MMHG | WEIGHT: 186 LBS | RESPIRATION RATE: 16 BRPM | BODY MASS INDEX: 26.04 KG/M2 | OXYGEN SATURATION: 98 % | SYSTOLIC BLOOD PRESSURE: 112 MMHG | HEART RATE: 68 BPM | HEIGHT: 71 IN

## 2021-07-14 PROCEDURE — 99205 OFFICE O/P NEW HI 60 MIN: CPT

## 2021-07-14 PROCEDURE — 99072 ADDL SUPL MATRL&STAF TM PHE: CPT

## 2021-07-14 NOTE — PHYSICAL EXAM
[General Appearance - Alert] : alert [General Appearance - In No Acute Distress] : in no acute distress [Strabismus] : no strabismus was seen [Hearing Threshold Finger Rub Not [place]] : hearing was normal [Jugular Venous Distention Increased] : there was no jugular-venous distention [Auscultation Breath Sounds / Voice Sounds] : lungs were clear to auscultation bilaterally [Heart Sounds] : normal S1 and S2 [Edema] : there was no peripheral edema [Bowel Sounds] : normal bowel sounds [Abdomen Tenderness] : non-tender [No CVA Tenderness] : no ~M costovertebral angle tenderness [] : no rash [No Focal Deficits] : no focal deficits [Oriented To Time, Place, And Person] : oriented to person, place, and time [Impaired Insight] : insight and judgment were intact

## 2021-07-14 NOTE — ASSESSMENT
[FreeTextEntry1] : Crescencio on CKD stage 3\par Hypertensive nephropathy\par Crescencio ? hemodynamic ATN in setting of recent syncope \par repeat labs today\par \par HTN/ Volume\par BP stable\par Euvolemic\par Continue Norvasc and ARB (hold if crescencio worsening)\par \par Bladder ca s/p resection and Gemcitabine rx\par f.u with urology\par \par obtain renal US\par avoid NSAIDs\par \par RTC in 6 weeks

## 2021-07-14 NOTE — HISTORY OF PRESENT ILLNESS
[FreeTextEntry1] : This is a 75 year old male with PMH stroke 2019 (treated at [facility], unclear cause), CKD, prior prostate CA, HTN, HLD, recently diagnosed with bladder mass s/p resection and Gemcitabine x 6 cycles - follows with Dr. Varghese.\par Pt sent by PCP for Crescencio on CKD.\par Pt accompanied by daughter- Yris ([employer] employee- )\par Pt states he feels well- no acute complaint\par Daughter reports pt had a syncopal episode in June- was taken to [facility]- currently has a loop recorder.\par He was previously seeing a nephrologist in [facility]_ had a kidney biopsy in 2018 reported as hypertensive nephropathy.\par Pt denies any changes in urination\par Denies NSAID use.\par \par

## 2021-07-16 LAB
25(OH)D3 SERPL-MCNC: 38.2 NG/ML
ALBUMIN SERPL ELPH-MCNC: 4.4 G/DL
ANION GAP SERPL CALC-SCNC: 13 MMOL/L
APPEARANCE: CLEAR
BACTERIA: NEGATIVE
BASOPHILS # BLD AUTO: 0.04 K/UL
BASOPHILS NFR BLD AUTO: 0.8 %
BILIRUBIN URINE: NEGATIVE
BLOOD URINE: NEGATIVE
BUN SERPL-MCNC: 24 MG/DL
CALCIUM SERPL-MCNC: 10.1 MG/DL
CALCIUM SERPL-MCNC: 10.1 MG/DL
CHLORIDE SERPL-SCNC: 105 MMOL/L
CO2 SERPL-SCNC: 26 MMOL/L
COLOR: YELLOW
CREAT SERPL-MCNC: 1.99 MG/DL
CREAT SPEC-SCNC: 201 MG/DL
CREAT/PROT UR: 0.1 RATIO
EOSINOPHIL # BLD AUTO: 0.14 K/UL
EOSINOPHIL NFR BLD AUTO: 2.8 %
GLUCOSE QUALITATIVE U: NEGATIVE
GLUCOSE SERPL-MCNC: 124 MG/DL
HCT VFR BLD CALC: 48 %
HGB BLD-MCNC: 14.6 G/DL
HYALINE CASTS: 4 /LPF
IMM GRANULOCYTES NFR BLD AUTO: 0.4 %
KETONES URINE: NORMAL
LEUKOCYTE ESTERASE URINE: NEGATIVE
LYMPHOCYTES # BLD AUTO: 0.94 K/UL
LYMPHOCYTES NFR BLD AUTO: 19 %
MAN DIFF?: NORMAL
MCHC RBC-ENTMCNC: 29.4 PG
MCHC RBC-ENTMCNC: 30.4 GM/DL
MCV RBC AUTO: 96.8 FL
MICROSCOPIC-UA: NORMAL
MONOCYTES # BLD AUTO: 0.42 K/UL
MONOCYTES NFR BLD AUTO: 8.5 %
NEUTROPHILS # BLD AUTO: 3.39 K/UL
NEUTROPHILS NFR BLD AUTO: 68.5 %
NITRITE URINE: NEGATIVE
PARATHYROID HORMONE INTACT: 39 PG/ML
PH URINE: 5.5
PHOSPHATE SERPL-MCNC: 2.7 MG/DL
PLATELET # BLD AUTO: 186 K/UL
POTASSIUM SERPL-SCNC: 4.6 MMOL/L
PROT UR-MCNC: 18 MG/DL
PROTEIN URINE: NORMAL
RBC # BLD: 4.96 M/UL
RBC # FLD: 13.1 %
RED BLOOD CELLS URINE: 0 /HPF
SODIUM SERPL-SCNC: 144 MMOL/L
SPECIFIC GRAVITY URINE: 1.01
SQUAMOUS EPITHELIAL CELLS: 1 /HPF
UROBILINOGEN URINE: NORMAL
WBC # FLD AUTO: 4.95 K/UL
WHITE BLOOD CELLS URINE: 1 /HPF

## 2021-08-02 PROBLEM — R94.31 ABNORMAL EKG: Status: ACTIVE | Noted: 2021-02-02

## 2021-08-02 PROBLEM — R55 SYNCOPE: Status: ACTIVE | Noted: 2020-09-18

## 2021-08-03 ENCOUNTER — APPOINTMENT (OUTPATIENT)
Dept: UROLOGY | Facility: CLINIC | Age: 76
End: 2021-08-03

## 2021-08-09 ENCOUNTER — NON-APPOINTMENT (OUTPATIENT)
Age: 76
End: 2021-08-09

## 2021-08-17 ENCOUNTER — OUTPATIENT (OUTPATIENT)
Dept: OUTPATIENT SERVICES | Facility: HOSPITAL | Age: 76
LOS: 1 days | End: 2021-08-17

## 2021-08-17 ENCOUNTER — APPOINTMENT (OUTPATIENT)
Dept: ULTRASOUND IMAGING | Facility: CLINIC | Age: 76
End: 2021-08-17
Payer: MEDICARE

## 2021-08-17 DIAGNOSIS — Z00.00 ENCOUNTER FOR GENERAL ADULT MEDICAL EXAMINATION WITHOUT ABNORMAL FINDINGS: ICD-10-CM

## 2021-08-17 DIAGNOSIS — Z98.890 OTHER SPECIFIED POSTPROCEDURAL STATES: Chronic | ICD-10-CM

## 2021-08-17 DIAGNOSIS — Z87.828 PERSONAL HISTORY OF OTHER (HEALED) PHYSICAL INJURY AND TRAUMA: Chronic | ICD-10-CM

## 2021-08-17 DIAGNOSIS — Z98.49 CATARACT EXTRACTION STATUS, UNSPECIFIED EYE: Chronic | ICD-10-CM

## 2021-08-17 PROCEDURE — 76775 US EXAM ABDO BACK WALL LIM: CPT | Mod: 26

## 2021-08-18 ENCOUNTER — APPOINTMENT (OUTPATIENT)
Dept: INTERNAL MEDICINE | Facility: CLINIC | Age: 76
End: 2021-08-18
Payer: MEDICARE

## 2021-08-18 VITALS
HEART RATE: 74 BPM | SYSTOLIC BLOOD PRESSURE: 118 MMHG | RESPIRATION RATE: 14 BRPM | TEMPERATURE: 96.4 F | HEIGHT: 71 IN | OXYGEN SATURATION: 97 % | BODY MASS INDEX: 26.32 KG/M2 | WEIGHT: 188 LBS | DIASTOLIC BLOOD PRESSURE: 66 MMHG

## 2021-08-18 DIAGNOSIS — K59.00 CONSTIPATION, UNSPECIFIED: ICD-10-CM

## 2021-08-18 PROCEDURE — 99214 OFFICE O/P EST MOD 30 MIN: CPT

## 2021-08-18 NOTE — PHYSICAL EXAM
[Normal] : normal gait, coordination grossly intact, no focal deficits and deep tendon reflexes were 2+ and symmetric [de-identified] : trace RLE Edema up to ankle

## 2021-08-18 NOTE — ASSESSMENT
[FreeTextEntry1] : -PMH: H/o CVA (9/2019). HTN, HLD, H/o Prostate CA (Dx 2013), Gait Instability \par -SH: He lives at home w/ his daughter. Independent of all ADLs/IALDs. Denies difficulties climbing stairs. No grab bar or chair in shower. \par \par LEOLA is a 75 year M whom is here today for f/u HTN, Gait Instability\par \par Specialists Involved:\par -Prior PMD: Dr. Schafer\par -Neuro: Dr. Greenwood\par -Nephro: Dr. Rob Stone ([phone])\par -Cardio: Dr. Crystal\par \par -Home PT advised given recent fall\par -RTO 3mo or sooner if needed

## 2021-08-18 NOTE — HISTORY OF PRESENT ILLNESS
[FreeTextEntry1] : f/u HTN, Gait Instability [de-identified] : -PMH: H/o CVA (9/2019). HTN, HLD, H/o Prostate CA (Dx 2013), Gait Instability \par -SH: He lives at home w/ his daughter. Independent of all ADLs/IALDs. Denies difficulties climbing stairs. No grab bar or chair in shower. \par \par LEOLA is a 75 year M whom is here today for f/u HTN, Gait Instability\par Today, pt reports that he is feeling well and is w/o complaints\par He is accompanied by his daughter today\par \par -Gait Instability: No new falls. Lives at home w/ his daughter. \par -H/o CVA 9/2019 w/ no residual deficits: Remains on Aspirin 81mg QD & Atorvastatin 40mg HS. Follows w/ Neuro. No reported changes.\par -HTN: Remains on Losartan 25mg QD & Amodipine 2.5mg QD. Follows w/ Cardio. No reported changes. \par -HLD: Remains on Lipitor 40mg HS. No reported changes. \par -CKD: Following with Nephro.  [FreeTextEntry8] : \par

## 2021-08-26 ENCOUNTER — APPOINTMENT (OUTPATIENT)
Dept: NEPHROLOGY | Facility: CLINIC | Age: 76
End: 2021-08-26
Payer: MEDICARE

## 2021-08-26 ENCOUNTER — NON-APPOINTMENT (OUTPATIENT)
Age: 76
End: 2021-08-26

## 2021-08-26 VITALS
HEIGHT: 71 IN | HEART RATE: 66 BPM | SYSTOLIC BLOOD PRESSURE: 116 MMHG | BODY MASS INDEX: 26.04 KG/M2 | OXYGEN SATURATION: 98 % | DIASTOLIC BLOOD PRESSURE: 64 MMHG | WEIGHT: 186 LBS

## 2021-08-26 PROCEDURE — 99215 OFFICE O/P EST HI 40 MIN: CPT

## 2021-08-26 PROCEDURE — ZZZZZ: CPT

## 2021-08-26 NOTE — PHYSICAL EXAM
[General Appearance - Alert] : alert [General Appearance - In No Acute Distress] : in no acute distress [Strabismus] : no strabismus was seen [Hearing Threshold Finger Rub Not [place]] : hearing was normal [Jugular Venous Distention Increased] : there was no jugular-venous distention [Auscultation Breath Sounds / Voice Sounds] : lungs were clear to auscultation bilaterally [Heart Sounds] : normal S1 and S2 [Edema] : there was no peripheral edema [Bowel Sounds] : normal bowel sounds [Abdomen Tenderness] : non-tender [No CVA Tenderness] : no ~M costovertebral angle tenderness [] : no rash [No Focal Deficits] : no focal deficits [Oriented To Time, Place, And Person] : oriented to person, place, and time [Impaired Insight] : insight and judgment were intact

## 2021-08-26 NOTE — ASSESSMENT
[FreeTextEntry1] : Crescencio on CKD stage 3\par Hypertensive nephropathy\par Crescencio ? hemodynamic ATN in setting of recent syncope \par repeat labs with Scr 1.9\par \par HTN/ Volume\par BP stable/ Euvolemic\par Continue ARB\par Ok to resume Norvasc\par \par Bladder ca s/p resection and Gemcitabine Rx\par f.u with urology\par \par Renal US with b/l renal cysts\par CT abdomen with IV contrast last year with no mention of renal cysts\par Repeat Renal US in 6 months\par \par avoid NSAIDs\par \par rtc in 4 months

## 2021-08-26 NOTE — HISTORY OF PRESENT ILLNESS
[FreeTextEntry1] : This is a 75 year old male with PMH stroke 2019 (treated at [facility], unclear cause), CKD, prior prostate CA, HTN, HLD, recently diagnosed with bladder mass s/p resection and Gemcitabine x 6 cycles - follows with Dr. Varghese.\par Pt sent by PCP for Crescencio on CKD.\par Daughter reports pt had a syncopal episode in June- was taken to [facility]- currently has a loop recorder.\par He was previously seeing a nephrologist in [facility]_ had a kidney biopsy in 2018 reported as hypertensive nephropathy.\par \par Pt accompanied by daughter- Yris ([employer] employee- )\par Pt states he feels well- no acute complaint\par \par

## 2021-09-01 ENCOUNTER — RX RENEWAL (OUTPATIENT)
Age: 76
End: 2021-09-01

## 2021-09-08 ENCOUNTER — RX RENEWAL (OUTPATIENT)
Age: 76
End: 2021-09-08

## 2021-10-20 NOTE — ED ADULT NURSE REASSESSMENT NOTE - NURSING MUSC STRENGTH
[FreeTextEntry1] : Right hip wounds are getting smaller and are much less indurated/inflamed.\par I would recommend adding some abx ointment or vaseline to the dressing.\par I advised avoiding tape as much as possible as the tape is starting to cause some skin trauma.\par Use tights, "boy shorts", Spanx or bike shorts to hold gauze in place instead.\par Follow up 2-3 weeks.
hand grasp, leg strength strong and equal bilaterally
hand grasp, leg strength strong and equal bilaterally

## 2021-10-21 ENCOUNTER — APPOINTMENT (OUTPATIENT)
Dept: UROLOGY | Facility: CLINIC | Age: 76
End: 2021-10-21
Payer: MEDICARE

## 2021-10-21 ENCOUNTER — NON-APPOINTMENT (OUTPATIENT)
Age: 76
End: 2021-10-21

## 2021-10-21 VITALS
HEART RATE: 66 BPM | RESPIRATION RATE: 15 BRPM | WEIGHT: 186 LBS | DIASTOLIC BLOOD PRESSURE: 71 MMHG | HEIGHT: 71 IN | TEMPERATURE: 97.8 F | OXYGEN SATURATION: 98 % | SYSTOLIC BLOOD PRESSURE: 118 MMHG | BODY MASS INDEX: 26.04 KG/M2

## 2021-10-21 PROCEDURE — 52000 CYSTOURETHROSCOPY: CPT

## 2021-10-22 LAB — URINE CYTOLOGY: NORMAL

## 2021-10-28 ENCOUNTER — NON-APPOINTMENT (OUTPATIENT)
Age: 76
End: 2021-10-28

## 2021-10-28 ENCOUNTER — APPOINTMENT (OUTPATIENT)
Dept: INTERNAL MEDICINE | Facility: CLINIC | Age: 76
End: 2021-10-28
Payer: MEDICARE

## 2021-10-28 VITALS
HEART RATE: 71 BPM | RESPIRATION RATE: 14 BRPM | OXYGEN SATURATION: 98 % | SYSTOLIC BLOOD PRESSURE: 144 MMHG | HEIGHT: 71 IN | WEIGHT: 186 LBS | DIASTOLIC BLOOD PRESSURE: 86 MMHG | TEMPERATURE: 97 F | BODY MASS INDEX: 26.04 KG/M2

## 2021-10-28 PROCEDURE — 93000 ELECTROCARDIOGRAM COMPLETE: CPT

## 2021-10-28 PROCEDURE — 99214 OFFICE O/P EST MOD 30 MIN: CPT | Mod: 25

## 2021-10-28 RX ORDER — AMLODIPINE BESYLATE 2.5 MG/1
2.5 TABLET ORAL DAILY
Qty: 90 | Refills: 1 | Status: DISCONTINUED | COMMUNITY
Start: 2021-05-20 | End: 2021-10-28

## 2021-10-28 NOTE — ASSESSMENT
[Patient Requires Further Testing] : Patient requires further testing [Other: _____] : [unfilled] [FreeTextEntry4] : This is a 75-year-old male is here today for medical clearance\par Given pts h/o prior CVA, he should only hold his aspirin 81mg 4 day s prior to procedure. (These are the samne recommendations as per Cardio prior to clearance for same procedure in January 2021). \par He is to DC all OTC Vit Sup 7 days prior\par All other meds to be continued \par Clearance pending PST results

## 2021-10-28 NOTE — RESULTS/DATA
[] : results reviewed [de-identified] : Cardiac Pre-Op clearance 1/2021 Normal. No changes on EKG. No changes regarding PE or clinical symptoms

## 2021-10-28 NOTE — HISTORY OF PRESENT ILLNESS
[Coronary Artery Disease] : coronary artery disease [No Pertinent Pulmonary History] : no history of asthma, COPD, sleep apnea, or smoking [No Adverse Anesthesia Reaction] : no adverse anesthesia reaction in self or family member [Chronic Kidney Disease] : chronic kidney disease [(Patient denies any chest pain, claudication, dyspnea on exertion, orthopnea, palpitations or syncope)] : Patient denies any chest pain, claudication, dyspnea on exertion, orthopnea, palpitations or syncope [Good (7-10 METs)] : Good (7-10 METs) [Anti-Platelet Agents: _____] : Anti-Platelet Agents: [unfilled] [Family Member] : family member [Aortic Stenosis] : no aortic stenosis [Atrial Fibrillation] : no atrial fibrillation [Recent Myocardial Infarction] : no recent myocardial infarction [Implantable Device/Pacemaker] : no implantable device/pacemaker [Chronic Anticoagulation] : no chronic anticoagulation [Diabetes] : no diabetes [FreeTextEntry1] : Cystoscopy w/ Tumor removal [FreeTextEntry2] : 11/12/21 [FreeTextEntry3] : Dr. Varghese [FreeTextEntry4] : -PMH: H/o CVA (9/2019). HTN, HLD, H/o Prostate CA (Dx 2013), Gait Instability \par \par LEOLA is a 75 year M whom is here today to for medical clearance\par Today, pt reports feeling well and is w/o complaints. \par pt has PST on 11/1. \par \par -H/o CVA 9/2019 w/ no residual deficits: Remains on Aspirin 81mg QD & Atorvastatin 40mg HS. Follows w/ Neuro. No reported changes.\par -HTN: Remains on Losartan 25mg QD. Being monitored off Amlodipine 2.5mg QD. Follows w/ Cardio. No reported changes. \par -HLD: Remains on Lipitor 40mg HS. No reported changes. \par -CKD: Following with Nephro.

## 2021-11-01 ENCOUNTER — OUTPATIENT (OUTPATIENT)
Dept: OUTPATIENT SERVICES | Facility: HOSPITAL | Age: 76
LOS: 1 days | End: 2021-11-01

## 2021-11-01 VITALS
HEART RATE: 86 BPM | DIASTOLIC BLOOD PRESSURE: 93 MMHG | RESPIRATION RATE: 16 BRPM | SYSTOLIC BLOOD PRESSURE: 147 MMHG | HEIGHT: 70 IN | OXYGEN SATURATION: 100 % | TEMPERATURE: 97 F | WEIGHT: 182.1 LBS

## 2021-11-01 DIAGNOSIS — C67.9 MALIGNANT NEOPLASM OF BLADDER, UNSPECIFIED: Chronic | ICD-10-CM

## 2021-11-01 DIAGNOSIS — E78.5 HYPERLIPIDEMIA, UNSPECIFIED: ICD-10-CM

## 2021-11-01 DIAGNOSIS — Z90.79 ACQUIRED ABSENCE OF OTHER GENITAL ORGAN(S): Chronic | ICD-10-CM

## 2021-11-01 DIAGNOSIS — C67.8 MALIGNANT NEOPLASM OF OVERLAPPING SITES OF BLADDER: ICD-10-CM

## 2021-11-01 DIAGNOSIS — I10 ESSENTIAL (PRIMARY) HYPERTENSION: ICD-10-CM

## 2021-11-01 DIAGNOSIS — Z87.828 PERSONAL HISTORY OF OTHER (HEALED) PHYSICAL INJURY AND TRAUMA: Chronic | ICD-10-CM

## 2021-11-01 DIAGNOSIS — Z98.890 OTHER SPECIFIED POSTPROCEDURAL STATES: Chronic | ICD-10-CM

## 2021-11-01 DIAGNOSIS — Z98.49 CATARACT EXTRACTION STATUS, UNSPECIFIED EYE: Chronic | ICD-10-CM

## 2021-11-01 LAB
ALBUMIN SERPL ELPH-MCNC: 4.5 G/DL — SIGNIFICANT CHANGE UP (ref 3.3–5)
ALP SERPL-CCNC: 69 U/L — SIGNIFICANT CHANGE UP (ref 40–120)
ALT FLD-CCNC: 18 U/L — SIGNIFICANT CHANGE UP (ref 4–41)
ANION GAP SERPL CALC-SCNC: 9 MMOL/L — SIGNIFICANT CHANGE UP (ref 7–14)
AST SERPL-CCNC: 17 U/L — SIGNIFICANT CHANGE UP (ref 4–40)
BILIRUB SERPL-MCNC: 0.5 MG/DL — SIGNIFICANT CHANGE UP (ref 0.2–1.2)
BUN SERPL-MCNC: 15 MG/DL — SIGNIFICANT CHANGE UP (ref 7–23)
CALCIUM SERPL-MCNC: 9.8 MG/DL — SIGNIFICANT CHANGE UP (ref 8.4–10.5)
CHLORIDE SERPL-SCNC: 104 MMOL/L — SIGNIFICANT CHANGE UP (ref 98–107)
CO2 SERPL-SCNC: 29 MMOL/L — SIGNIFICANT CHANGE UP (ref 22–31)
CREAT SERPL-MCNC: 1.77 MG/DL — HIGH (ref 0.5–1.3)
GLUCOSE SERPL-MCNC: 111 MG/DL — HIGH (ref 70–99)
HCT VFR BLD CALC: 47 % — SIGNIFICANT CHANGE UP (ref 39–50)
HGB BLD-MCNC: 15 G/DL — SIGNIFICANT CHANGE UP (ref 13–17)
MCHC RBC-ENTMCNC: 29.2 PG — SIGNIFICANT CHANGE UP (ref 27–34)
MCHC RBC-ENTMCNC: 31.9 GM/DL — LOW (ref 32–36)
MCV RBC AUTO: 91.4 FL — SIGNIFICANT CHANGE UP (ref 80–100)
NRBC # BLD: 0 /100 WBCS — SIGNIFICANT CHANGE UP
NRBC # FLD: 0 K/UL — SIGNIFICANT CHANGE UP
PLATELET # BLD AUTO: 193 K/UL — SIGNIFICANT CHANGE UP (ref 150–400)
POTASSIUM SERPL-MCNC: 4.1 MMOL/L — SIGNIFICANT CHANGE UP (ref 3.5–5.3)
POTASSIUM SERPL-SCNC: 4.1 MMOL/L — SIGNIFICANT CHANGE UP (ref 3.5–5.3)
PROT SERPL-MCNC: 7.4 G/DL — SIGNIFICANT CHANGE UP (ref 6–8.3)
RBC # BLD: 5.14 M/UL — SIGNIFICANT CHANGE UP (ref 4.2–5.8)
RBC # FLD: 13.2 % — SIGNIFICANT CHANGE UP (ref 10.3–14.5)
SODIUM SERPL-SCNC: 142 MMOL/L — SIGNIFICANT CHANGE UP (ref 135–145)
WBC # BLD: 5.39 K/UL — SIGNIFICANT CHANGE UP (ref 3.8–10.5)
WBC # FLD AUTO: 5.39 K/UL — SIGNIFICANT CHANGE UP (ref 3.8–10.5)

## 2021-11-01 RX ORDER — AMLODIPINE BESYLATE 2.5 MG/1
1 TABLET ORAL
Qty: 0 | Refills: 0 | DISCHARGE

## 2021-11-01 RX ORDER — OMEGA-3 ACID ETHYL ESTERS 1 G
1 CAPSULE ORAL
Qty: 0 | Refills: 0 | DISCHARGE

## 2021-11-01 NOTE — H&P PST ADULT - NSICDXFAMILYHX_GEN_ALL_CORE_FT
FAMILY HISTORY:  FH: heart disease, mother    Mother  Still living? Unknown  FH: Alzheimers disease, Age at diagnosis: Age Unknown

## 2021-11-01 NOTE — H&P PST ADULT - OTHER CARE PROVIDERS
Dr Messer [facility] [phone] cardiologist, Dr. Weeks Deny-neurology; Latisha Ramon/nephrologist Carmella Maddox/cardiologist [phone]; Deny Ho-neurology; Latisha Ramon/nephrologist

## 2021-11-01 NOTE — H&P PST ADULT - HISTORY OF PRESENT ILLNESS
76 year old male with PMH of CVA 9/2019, HLD, HTN, dementia, prostate cancer 2012, s/p chemo and RT, bladder cancer s/p TURBT 01/2021 and bladder instillation, reports follow up cystoscopy in 10/2021 demonstrated recurrent bladder mass, c/o nocturia, pt denies hematuria, dysuria, increased urinary frequency or hesitancy, presents to PST with preop diagnosis of malignant neoplasm overlapping sites of bladder, scheduled for cystoscopy, transurethral resection of bladder tumor  Patient is poor historian  76 year old male with PMH of CVA 9/2019, HLD, HTN, CKD, dementia, prostate cancer 2012, s/p chemo and RT, bladder cancer s/p TURBT 01/2021 and bladder instillation, reports follow up cystoscopy in 10/2021 demonstrated recurrent bladder mass, c/o nocturia, pt denies hematuria, dysuria, increased urinary frequency or hesitancy, presents to PST with preop diagnosis of malignant neoplasm overlapping sites of bladder, scheduled for cystoscopy, transurethral resection of bladder tumor  Patient is poor historian
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
## 2021-11-01 NOTE — H&P PST ADULT - PROBLEM SELECTOR PLAN 1
pt scheduled for cystoscopy, transurethral resection of bladder tumor on 11/12/21  Preop instructions provided. Pt verbalized understanding.   daughter confirmed pt has appt for COVID test 72 hrs preop  h/o dementia, poor historian, s/p med eval, copy in chart pt scheduled for cystoscopy, transurethral resection of bladder tumor on 11/12/21  Preop instructions provided. Pt verbalized understanding.   daughter confirmed pt has appt for COVID test 72 hrs preop  h/o dementia, poor historian, s/p med eval, copy in chart    h/o CVA- pt to hold Aspirin 4 days preop as per PCP instructions

## 2021-11-01 NOTE — H&P PST ADULT - GENERAL GENITOURINARY SYMPTOMS
h/o prostate cancer, CKD stage 3, followed by nephrologist, malignant neoplasm overlapping sites of bladder/nocturia

## 2021-11-01 NOTE — H&P PST ADULT - CARDIOVASCULAR COMMENTS
h/o HTN, HLD, h/o syncopal episode 06/2021, s/p admission at [facility] as per daughter "it was due to dehydration" h/o HTN, HLD, h/o syncopal episode 06/2021, s/p admission at [facility] x 24 hrs; as per daughter "it was due to dehydration"

## 2021-11-01 NOTE — H&P PST ADULT - NSICDXPASTMEDICALHX_GEN_ALL_CORE_FT
PAST MEDICAL HISTORY:  Chronic kidney disease (CKD) stage 3    CVA (cerebrovascular accident)     Dementia     Glaucoma     Gross hematuria     H/O cataract     HLD (hyperlipidemia)     [place] (hard of hearing)     HTN (hypertension)     Prostate cancer     Syncope several episodes origin [facility]

## 2021-11-01 NOTE — H&P PST ADULT - NEUROLOGICAL COMMENTS
h/o CVA 2019, denies residual weakness, followed by neurologist every 6 months, last eval in 06/2021; h/o syncopal episode s/p neurology eval in 06/2021 EEG negative as per daughter; h/o dementia

## 2021-11-09 ENCOUNTER — APPOINTMENT (OUTPATIENT)
Dept: DISASTER EMERGENCY | Facility: CLINIC | Age: 76
End: 2021-11-09

## 2021-11-10 LAB — SARS-COV-2 N GENE NPH QL NAA+PROBE: NOT DETECTED

## 2021-11-11 ENCOUNTER — TRANSCRIPTION ENCOUNTER (OUTPATIENT)
Age: 76
End: 2021-11-11

## 2021-11-11 VITALS
RESPIRATION RATE: 16 BRPM | OXYGEN SATURATION: 100 % | DIASTOLIC BLOOD PRESSURE: 67 MMHG | TEMPERATURE: 98 F | SYSTOLIC BLOOD PRESSURE: 131 MMHG | HEIGHT: 70 IN | WEIGHT: 182.1 LBS | HEART RATE: 65 BPM

## 2021-11-12 ENCOUNTER — OUTPATIENT (OUTPATIENT)
Dept: OUTPATIENT SERVICES | Facility: HOSPITAL | Age: 76
LOS: 1 days | Discharge: HOME | End: 2021-11-12
Payer: MEDICARE

## 2021-11-12 ENCOUNTER — RESULT REVIEW (OUTPATIENT)
Age: 76
End: 2021-11-12

## 2021-11-12 ENCOUNTER — APPOINTMENT (OUTPATIENT)
Dept: UROLOGY | Facility: AMBULATORY SURGERY CENTER | Age: 76
End: 2021-11-12

## 2021-11-12 VITALS
OXYGEN SATURATION: 99 % | DIASTOLIC BLOOD PRESSURE: 61 MMHG | HEART RATE: 63 BPM | SYSTOLIC BLOOD PRESSURE: 131 MMHG | RESPIRATION RATE: 13 BRPM | TEMPERATURE: 98 F

## 2021-11-12 DIAGNOSIS — Z87.828 PERSONAL HISTORY OF OTHER (HEALED) PHYSICAL INJURY AND TRAUMA: Chronic | ICD-10-CM

## 2021-11-12 DIAGNOSIS — C67.9 MALIGNANT NEOPLASM OF BLADDER, UNSPECIFIED: Chronic | ICD-10-CM

## 2021-11-12 DIAGNOSIS — C67.8 MALIGNANT NEOPLASM OF OVERLAPPING SITES OF BLADDER: ICD-10-CM

## 2021-11-12 DIAGNOSIS — Z90.79 ACQUIRED ABSENCE OF OTHER GENITAL ORGAN(S): Chronic | ICD-10-CM

## 2021-11-12 DIAGNOSIS — Z98.890 OTHER SPECIFIED POSTPROCEDURAL STATES: Chronic | ICD-10-CM

## 2021-11-12 DIAGNOSIS — Z98.49 CATARACT EXTRACTION STATUS, UNSPECIFIED EYE: Chronic | ICD-10-CM

## 2021-11-12 PROCEDURE — 88307 TISSUE EXAM BY PATHOLOGIST: CPT | Mod: 26

## 2021-11-12 PROCEDURE — 52240 CYSTOSCOPY AND TREATMENT: CPT

## 2021-11-12 RX ORDER — SODIUM CHLORIDE 9 MG/ML
1000 INJECTION, SOLUTION INTRAVENOUS
Refills: 0 | Status: DISCONTINUED | OUTPATIENT
Start: 2021-11-12 | End: 2021-11-27

## 2021-11-12 NOTE — ASU DISCHARGE PLAN (ADULT/PEDIATRIC) - CARE PROVIDER_API CALL
Ulises Varghese)  Urology  [address] [place] [address], [place] [address]  Phone: [phone]  Fax: [phone]  Established Patient  Follow Up Time: Routine

## 2021-11-12 NOTE — ASU DISCHARGE PLAN (ADULT/PEDIATRIC) - NURSING INSTRUCTIONS
Nothing spicy, greasy or fried food for the first 12 hours to prevent nausea and vomiting.  start with clear liquids and gradually increase your diet as you can, until you return to your normal diet.  '

## 2021-11-17 LAB — SURGICAL PATHOLOGY STUDY: SIGNIFICANT CHANGE UP

## 2021-11-18 RX ORDER — AMOXICILLIN AND CLAVULANATE POTASSIUM 500; 125 MG/1; MG/1
500-125 TABLET, FILM COATED ORAL TWICE DAILY
Qty: 10 | Refills: 0 | Status: COMPLETED | COMMUNITY
Start: 2021-11-08 | End: 2021-11-18

## 2021-11-23 ENCOUNTER — APPOINTMENT (OUTPATIENT)
Dept: CARDIOLOGY | Facility: CLINIC | Age: 76
End: 2021-11-23

## 2021-12-01 ENCOUNTER — APPOINTMENT (OUTPATIENT)
Dept: NEPHROLOGY | Facility: CLINIC | Age: 76
End: 2021-12-01

## 2021-12-02 ENCOUNTER — RX RENEWAL (OUTPATIENT)
Age: 76
End: 2021-12-02

## 2021-12-06 ENCOUNTER — APPOINTMENT (OUTPATIENT)
Dept: UROLOGY | Facility: CLINIC | Age: 76
End: 2021-12-06
Payer: MEDICARE

## 2021-12-06 VITALS
BODY MASS INDEX: 26.04 KG/M2 | WEIGHT: 186 LBS | HEIGHT: 71 IN | TEMPERATURE: 97.8 F | SYSTOLIC BLOOD PRESSURE: 113 MMHG | RESPIRATION RATE: 15 BRPM | OXYGEN SATURATION: 97 % | DIASTOLIC BLOOD PRESSURE: 73 MMHG | HEART RATE: 73 BPM

## 2021-12-06 PROCEDURE — 51720 TREATMENT OF BLADDER LESION: CPT

## 2021-12-06 RX ORDER — BACILLUS CALMETTE-GUERIN 50 MG/50ML
50 POWDER, FOR SUSPENSION INTRAVESICAL
Qty: 0 | Refills: 0 | Status: COMPLETED | OUTPATIENT
Start: 2021-12-06

## 2021-12-13 ENCOUNTER — APPOINTMENT (OUTPATIENT)
Dept: UROLOGY | Facility: CLINIC | Age: 76
End: 2021-12-13
Payer: MEDICARE

## 2021-12-13 PROCEDURE — 51720 TREATMENT OF BLADDER LESION: CPT

## 2021-12-13 RX ORDER — BACILLUS CALMETTE-GUERIN 50 MG/50ML
50 POWDER, FOR SUSPENSION INTRAVESICAL
Qty: 0 | Refills: 0 | Status: COMPLETED | OUTPATIENT
Start: 2021-12-13

## 2021-12-19 ENCOUNTER — RX RENEWAL (OUTPATIENT)
Age: 76
End: 2021-12-19

## 2021-12-20 ENCOUNTER — APPOINTMENT (OUTPATIENT)
Dept: UROLOGY | Facility: CLINIC | Age: 76
End: 2021-12-20
Payer: MEDICARE

## 2021-12-20 VITALS — TEMPERATURE: 98.1 F | DIASTOLIC BLOOD PRESSURE: 70 MMHG | SYSTOLIC BLOOD PRESSURE: 120 MMHG

## 2021-12-20 PROCEDURE — 51720 TREATMENT OF BLADDER LESION: CPT

## 2021-12-20 RX ORDER — BACILLUS CALMETTE-GUERIN 50 MG/50ML
50 POWDER, FOR SUSPENSION INTRAVESICAL
Qty: 0 | Refills: 0 | Status: COMPLETED | OUTPATIENT
Start: 2021-12-20

## 2021-12-27 ENCOUNTER — APPOINTMENT (OUTPATIENT)
Dept: UROLOGY | Facility: CLINIC | Age: 76
End: 2021-12-27
Payer: MEDICARE

## 2021-12-27 PROCEDURE — 51720 TREATMENT OF BLADDER LESION: CPT

## 2021-12-27 RX ORDER — BACILLUS CALMETTE-GUERIN 50 MG/50ML
50 POWDER, FOR SUSPENSION INTRAVESICAL
Qty: 0 | Refills: 0 | Status: COMPLETED | OUTPATIENT
Start: 2021-12-27

## 2021-12-27 RX ADMIN — BACILLUS CALMETTE-GUERIN 0 MG: 50 POWDER, FOR SUSPENSION INTRAVESICAL at 00:00

## 2022-01-01 ENCOUNTER — APPOINTMENT (OUTPATIENT)
Dept: UROLOGY | Facility: CLINIC | Age: 77
End: 2022-01-01
Payer: MEDICARE

## 2022-01-01 ENCOUNTER — APPOINTMENT (OUTPATIENT)
Dept: UROLOGY | Facility: CLINIC | Age: 77
End: 2022-01-01

## 2022-01-01 ENCOUNTER — RX RENEWAL (OUTPATIENT)
Age: 77
End: 2022-01-01

## 2022-01-01 ENCOUNTER — APPOINTMENT (OUTPATIENT)
Dept: INTERNAL MEDICINE | Facility: CLINIC | Age: 77
End: 2022-01-01

## 2022-01-01 VITALS
DIASTOLIC BLOOD PRESSURE: 64 MMHG | SYSTOLIC BLOOD PRESSURE: 95 MMHG | RESPIRATION RATE: 16 BRPM | TEMPERATURE: 97.4 F | HEART RATE: 74 BPM | WEIGHT: 175 LBS | OXYGEN SATURATION: 97 % | BODY MASS INDEX: 24.5 KG/M2 | HEIGHT: 71 IN

## 2022-01-01 VITALS
HEIGHT: 71 IN | TEMPERATURE: 98.1 F | OXYGEN SATURATION: 99 % | BODY MASS INDEX: 24.5 KG/M2 | RESPIRATION RATE: 16 BRPM | HEART RATE: 101 BPM | DIASTOLIC BLOOD PRESSURE: 75 MMHG | WEIGHT: 175 LBS | SYSTOLIC BLOOD PRESSURE: 125 MMHG

## 2022-01-01 VITALS
RESPIRATION RATE: 15 BRPM | HEART RATE: 67 BPM | OXYGEN SATURATION: 98 % | WEIGHT: 185 LBS | SYSTOLIC BLOOD PRESSURE: 110 MMHG | TEMPERATURE: 97.4 F | DIASTOLIC BLOOD PRESSURE: 70 MMHG | HEIGHT: 71 IN | BODY MASS INDEX: 25.9 KG/M2

## 2022-01-01 VITALS
HEART RATE: 75 BPM | RESPIRATION RATE: 14 BRPM | TEMPERATURE: 97.8 F | OXYGEN SATURATION: 94 % | BODY MASS INDEX: 25.9 KG/M2 | WEIGHT: 185 LBS | HEIGHT: 71 IN | DIASTOLIC BLOOD PRESSURE: 77 MMHG | SYSTOLIC BLOOD PRESSURE: 133 MMHG

## 2022-01-01 VITALS
HEIGHT: 71 IN | BODY MASS INDEX: 25.9 KG/M2 | TEMPERATURE: 97.2 F | SYSTOLIC BLOOD PRESSURE: 103 MMHG | HEART RATE: 74 BPM | DIASTOLIC BLOOD PRESSURE: 63 MMHG | OXYGEN SATURATION: 97 % | WEIGHT: 185 LBS | RESPIRATION RATE: 14 BRPM

## 2022-01-01 VITALS
BODY MASS INDEX: 24.5 KG/M2 | HEIGHT: 71 IN | DIASTOLIC BLOOD PRESSURE: 70 MMHG | SYSTOLIC BLOOD PRESSURE: 126 MMHG | OXYGEN SATURATION: 96 % | HEART RATE: 73 BPM | TEMPERATURE: 95.7 F | WEIGHT: 175 LBS

## 2022-01-01 VITALS
BODY MASS INDEX: 24.5 KG/M2 | WEIGHT: 175 LBS | HEART RATE: 97 BPM | HEIGHT: 71 IN | SYSTOLIC BLOOD PRESSURE: 136 MMHG | OXYGEN SATURATION: 100 % | RESPIRATION RATE: 17 BRPM | DIASTOLIC BLOOD PRESSURE: 79 MMHG

## 2022-01-01 VITALS
RESPIRATION RATE: 16 BRPM | DIASTOLIC BLOOD PRESSURE: 72 MMHG | SYSTOLIC BLOOD PRESSURE: 119 MMHG | TEMPERATURE: 98 F | OXYGEN SATURATION: 99 % | HEART RATE: 69 BPM

## 2022-01-01 DIAGNOSIS — R30.0 DYSURIA: ICD-10-CM

## 2022-01-01 DIAGNOSIS — I10 ESSENTIAL (PRIMARY) HYPERTENSION: ICD-10-CM

## 2022-01-01 DIAGNOSIS — E78.5 HYPERLIPIDEMIA, UNSPECIFIED: ICD-10-CM

## 2022-01-01 DIAGNOSIS — Z13.820 ENCOUNTER FOR SCREENING FOR OSTEOPOROSIS: ICD-10-CM

## 2022-01-01 DIAGNOSIS — N18.9 CHRONIC KIDNEY DISEASE, UNSPECIFIED: ICD-10-CM

## 2022-01-01 DIAGNOSIS — Z85.46 PERSONAL HISTORY OF MALIGNANT NEOPLASM OF PROSTATE: ICD-10-CM

## 2022-01-01 DIAGNOSIS — Z13.1 ENCOUNTER FOR SCREENING FOR DIABETES MELLITUS: ICD-10-CM

## 2022-01-01 DIAGNOSIS — R26.81 UNSTEADINESS ON FEET: ICD-10-CM

## 2022-01-01 DIAGNOSIS — C67.8 MALIGNANT NEOPLASM OF OVERLAPPING SITES OF BLADDER: ICD-10-CM

## 2022-01-01 DIAGNOSIS — Z13.29 ENCOUNTER FOR SCREENING FOR OTHER SUSPECTED ENDOCRINE DISORDER: ICD-10-CM

## 2022-01-01 DIAGNOSIS — Z12.5 ENCOUNTER FOR SCREENING FOR MALIGNANT NEOPLASM OF PROSTATE: ICD-10-CM

## 2022-01-01 DIAGNOSIS — R73.03 PREDIABETES.: ICD-10-CM

## 2022-01-01 LAB
ALBUMIN SERPL ELPH-MCNC: 4.3 G/DL
ALP BLD-CCNC: 66 U/L
ALT SERPL-CCNC: 25 U/L
ANION GAP SERPL CALC-SCNC: 13 MMOL/L
APPEARANCE: CLEAR
AST SERPL-CCNC: 15 U/L
BACTERIA UR CULT: NORMAL
BACTERIA: NEGATIVE
BILIRUB SERPL-MCNC: 0.4 MG/DL
BILIRUBIN URINE: NEGATIVE
BLOOD URINE: NEGATIVE
BUN SERPL-MCNC: 31 MG/DL
CALCIUM SERPL-MCNC: 9.9 MG/DL
CHLORIDE SERPL-SCNC: 106 MMOL/L
CHOLEST SERPL-MCNC: 142 MG/DL
CO2 SERPL-SCNC: 25 MMOL/L
COLOR: YELLOW
CREAT SERPL-MCNC: 1.98 MG/DL
EGFR: 34 ML/MIN/1.73M2
ESTIMATED AVERAGE GLUCOSE: 126 MG/DL
FOLATE SERPL-MCNC: >20 NG/ML
GLUCOSE QUALITATIVE U: ABNORMAL
GLUCOSE SERPL-MCNC: 107 MG/DL
HBA1C MFR BLD HPLC: 6 %
HDLC SERPL-MCNC: 71 MG/DL
HYALINE CASTS: 2 /LPF
KETONES URINE: NEGATIVE
LDLC SERPL CALC-MCNC: 65 MG/DL
LEUKOCYTE ESTERASE URINE: ABNORMAL
MICROSCOPIC-UA: NORMAL
NITRITE URINE: NEGATIVE
NONHDLC SERPL-MCNC: 72 MG/DL
PH URINE: 6
POTASSIUM SERPL-SCNC: 4.3 MMOL/L
PROT SERPL-MCNC: 7 G/DL
PROTEIN URINE: NORMAL
PSA SERPL-MCNC: 0.44 NG/ML
RED BLOOD CELLS URINE: 1 /HPF
SODIUM SERPL-SCNC: 144 MMOL/L
SPECIFIC GRAVITY URINE: 1.01
SQUAMOUS EPITHELIAL CELLS: 1 /HPF
TRIGL SERPL-MCNC: 32 MG/DL
TSH SERPL-ACNC: 1.96 UIU/ML
URINE CYTOLOGY: NORMAL
URINE CYTOLOGY: NORMAL
UROBILINOGEN URINE: NORMAL
VIT B12 SERPL-MCNC: 909 PG/ML
WHITE BLOOD CELLS URINE: 5 /HPF

## 2022-01-01 PROCEDURE — 51720 TREATMENT OF BLADDER LESION: CPT

## 2022-01-01 PROCEDURE — 99214 OFFICE O/P EST MOD 30 MIN: CPT

## 2022-01-01 PROCEDURE — 52000 CYSTOURETHROSCOPY: CPT

## 2022-01-01 PROCEDURE — 96402 CHEMO HORMON ANTINEOPL SQ/IM: CPT

## 2022-01-01 PROCEDURE — 52281 CYSTOSCOPY AND TREATMENT: CPT

## 2022-01-01 PROCEDURE — G0439: CPT

## 2022-01-01 PROCEDURE — 36415 COLL VENOUS BLD VENIPUNCTURE: CPT

## 2022-01-01 RX ORDER — GEMCITABINE 2 G/50ML
2 INJECTION, POWDER, LYOPHILIZED, FOR SOLUTION INTRAVENOUS
Qty: 1 | Refills: 0 | Status: COMPLETED | OUTPATIENT
Start: 2022-01-01

## 2022-01-01 RX ORDER — GEMCITABINE 2 G/50ML
2 INJECTION, POWDER, LYOPHILIZED, FOR SOLUTION INTRAVENOUS
Qty: 1 | Refills: 2 | Status: ACTIVE | COMMUNITY
Start: 2022-01-01 | End: 1900-01-01

## 2022-01-01 RX ORDER — TAMSULOSIN HYDROCHLORIDE 0.4 MG/1
0.4 CAPSULE ORAL
Qty: 90 | Refills: 3 | Status: ACTIVE | COMMUNITY
Start: 2021-11-18 | End: 1900-01-01

## 2022-01-01 RX ORDER — AMLODIPINE BESYLATE 2.5 MG/1
2.5 TABLET ORAL DAILY
Qty: 90 | Refills: 1 | Status: ACTIVE | COMMUNITY
Start: 2022-05-19 | End: 1900-01-01

## 2022-01-01 RX ORDER — LOSARTAN POTASSIUM 25 MG/1
25 TABLET, FILM COATED ORAL
Qty: 90 | Refills: 1 | Status: ACTIVE | COMMUNITY
Start: 2021-06-07 | End: 1900-01-01

## 2022-01-01 RX ORDER — BACILLUS CALMETTE-GUERIN 50 MG/50ML
50 POWDER, FOR SUSPENSION INTRAVESICAL
Qty: 0 | Refills: 0 | Status: COMPLETED | OUTPATIENT
Start: 2022-01-01

## 2022-01-01 RX ORDER — ATORVASTATIN CALCIUM 40 MG/1
40 TABLET, FILM COATED ORAL
Qty: 90 | Refills: 3 | Status: ACTIVE | COMMUNITY
Start: 2021-04-07 | End: 1900-01-01

## 2022-01-01 RX ADMIN — BACILLUS CALMETTE-GUERIN 0 MG: 50 POWDER, FOR SUSPENSION INTRAVESICAL at 00:00

## 2022-01-01 RX ADMIN — GEMCITABINE HYDROCHLORIDE 0 GM: 1 INJECTION, POWDER, LYOPHILIZED, FOR SOLUTION INTRAVENOUS at 00:00

## 2022-01-03 ENCOUNTER — APPOINTMENT (OUTPATIENT)
Dept: UROLOGY | Facility: CLINIC | Age: 77
End: 2022-01-03
Payer: MEDICARE

## 2022-01-03 VITALS
TEMPERATURE: 97.5 F | BODY MASS INDEX: 26.04 KG/M2 | DIASTOLIC BLOOD PRESSURE: 82 MMHG | HEIGHT: 71 IN | OXYGEN SATURATION: 96 % | RESPIRATION RATE: 15 BRPM | HEART RATE: 70 BPM | WEIGHT: 186 LBS | SYSTOLIC BLOOD PRESSURE: 147 MMHG

## 2022-01-03 PROCEDURE — 51720 TREATMENT OF BLADDER LESION: CPT

## 2022-01-03 RX ORDER — BACILLUS CALMETTE-GUERIN 50 MG/50ML
50 POWDER, FOR SUSPENSION INTRAVESICAL
Qty: 0 | Refills: 0 | Status: COMPLETED | OUTPATIENT
Start: 2022-01-03

## 2022-01-04 ENCOUNTER — NON-APPOINTMENT (OUTPATIENT)
Age: 77
End: 2022-01-04

## 2022-01-04 ENCOUNTER — APPOINTMENT (OUTPATIENT)
Dept: INTERNAL MEDICINE | Facility: CLINIC | Age: 77
End: 2022-01-04

## 2022-01-04 DIAGNOSIS — Z23 ENCOUNTER FOR IMMUNIZATION: ICD-10-CM

## 2022-01-04 DIAGNOSIS — Z01.818 ENCOUNTER FOR OTHER PREPROCEDURAL EXAMINATION: ICD-10-CM

## 2022-01-04 RX ORDER — DORZOLAMIDE HYDROCHLORIDE 20 MG/ML
2 SOLUTION OPHTHALMIC TWICE DAILY
Refills: 0 | Status: ACTIVE | COMMUNITY
Start: 2021-01-20

## 2022-01-04 RX ORDER — TRAVOPROST 0.04 MG/ML
0 SOLUTION OPHTHALMIC
Refills: 0 | Status: ACTIVE | COMMUNITY
Start: 2021-01-20

## 2022-01-04 RX ORDER — PENICILLIN V POTASSIUM 500 MG/1
500 TABLET, FILM COATED ORAL
Qty: 40 | Refills: 0 | Status: DISCONTINUED | COMMUNITY
Start: 2021-09-04

## 2022-01-10 ENCOUNTER — APPOINTMENT (OUTPATIENT)
Dept: UROLOGY | Facility: CLINIC | Age: 77
End: 2022-01-10
Payer: MEDICARE

## 2022-01-10 VITALS
TEMPERATURE: 97.7 F | SYSTOLIC BLOOD PRESSURE: 149 MMHG | WEIGHT: 186 LBS | RESPIRATION RATE: 16 BRPM | BODY MASS INDEX: 26.04 KG/M2 | HEIGHT: 71 IN | OXYGEN SATURATION: 99 % | DIASTOLIC BLOOD PRESSURE: 87 MMHG | HEART RATE: 77 BPM

## 2022-01-10 PROCEDURE — 51720 TREATMENT OF BLADDER LESION: CPT

## 2022-01-10 RX ORDER — BACILLUS CALMETTE-GUERIN 50 MG/50ML
50 POWDER, FOR SUSPENSION INTRAVESICAL
Qty: 0 | Refills: 0 | Status: COMPLETED | OUTPATIENT
Start: 2022-01-10

## 2022-01-11 LAB — URINE CYTOLOGY: NORMAL

## 2022-01-26 ENCOUNTER — APPOINTMENT (OUTPATIENT)
Dept: CARDIOLOGY | Facility: CLINIC | Age: 77
End: 2022-01-26

## 2022-01-27 ENCOUNTER — APPOINTMENT (OUTPATIENT)
Dept: NEPHROLOGY | Facility: CLINIC | Age: 77
End: 2022-01-27

## 2022-02-23 ENCOUNTER — APPOINTMENT (OUTPATIENT)
Dept: UROLOGY | Facility: CLINIC | Age: 77
End: 2022-02-23
Payer: MEDICARE

## 2022-02-23 VITALS
OXYGEN SATURATION: 99 % | WEIGHT: 186 LBS | SYSTOLIC BLOOD PRESSURE: 146 MMHG | TEMPERATURE: 97.5 F | HEIGHT: 71 IN | DIASTOLIC BLOOD PRESSURE: 79 MMHG | HEART RATE: 66 BPM | BODY MASS INDEX: 26.04 KG/M2 | RESPIRATION RATE: 15 BRPM

## 2022-02-23 PROCEDURE — 52000 CYSTOURETHROSCOPY: CPT

## 2022-02-24 LAB — PSA SERPL-MCNC: 0.44 NG/ML

## 2022-02-27 ENCOUNTER — RX RENEWAL (OUTPATIENT)
Age: 77
End: 2022-02-27

## 2022-04-15 ENCOUNTER — RX RENEWAL (OUTPATIENT)
Age: 77
End: 2022-04-15

## 2022-04-20 ENCOUNTER — NON-APPOINTMENT (OUTPATIENT)
Age: 77
End: 2022-04-20

## 2022-04-20 ENCOUNTER — APPOINTMENT (OUTPATIENT)
Dept: INTERNAL MEDICINE | Facility: CLINIC | Age: 77
End: 2022-04-20

## 2022-05-05 ENCOUNTER — APPOINTMENT (OUTPATIENT)
Dept: NEUROLOGY | Facility: CLINIC | Age: 77
End: 2022-05-05
Payer: MEDICARE

## 2022-05-05 DIAGNOSIS — Z86.73 PERSONAL HISTORY OF TRANSIENT ISCHEMIC ATTACK (TIA), AND CEREBRAL INFARCTION W/OUT RESIDUAL DEFICITS: ICD-10-CM

## 2022-05-05 PROCEDURE — 99213 OFFICE O/P EST LOW 20 MIN: CPT

## 2022-05-05 RX ORDER — ASPIRIN 81 MG/1
81 TABLET, CHEWABLE ORAL
Refills: 0 | Status: ACTIVE | COMMUNITY
Start: 2020-09-18

## 2022-05-05 NOTE — ASSESSMENT
[FreeTextEntry1] : This is a 76-year-old man with history of stroke.  His major risk factor is hyperlipidemia for which she is taking medication.  Continue with aspirin as well.  I will see him back in 1 year, sooner should the need arise.

## 2022-05-05 NOTE — HISTORY OF PRESENT ILLNESS
[FreeTextEntry1] : Initial office visit September 18, 2020:\par This is a 74-year-old man who presents today for neurologic evaluation. About a year ago he had a syncopal event and went to the hospital. An MRI was done and showed a left basal ganglia stroke. He followed with an outside neurologist. He's had 3 or 4 other episodes of syncope. These but I we have been in the shower. He does take medicine for hypertension and additional antihypertensive drug for renal protection. He is also at least once mistaken his wife's medication, labetalol as well. His daughter also reports some mild memory loss at times and some intermittent confusion. He is here today for neurologic evaluation.\par \par Followup December 22, 2020:\par This is a 75-year-old man who presents today for followup of stroke. He is accompanied by his son-in-law. He had a left basal ganglia stroke that was diagnosed about a year ago. He had had syncope in the past but does not report any new symptoms. Overall he is largely unchanged from his initial visit in September. He continues on medicine for hypertension and hyperlipidemia as well as aspirin. Per the chart and the patient he did have an episode of hematuria. A cystoscopy found a lesion in his bladder and per the son-in-law he is scheduled to have this operated on in January. He is here today for neurologic followup.\par \par Followup June 14, 2021:\par This is a 75-year-old man presenting for followup of stroke. He also had a syncopal event last week. He states he went to the hospital and workup was done which was negative. His son-in-law states that he was told that the patient's wife and daughter heard her foot from upstairs and when they came upstairs they saw him passed out and he had stiffened up. This lasted a minute or so. He slid down his stairs as he was a bit weak after this. He had a blood pressure of 90/50 per EMS. He is now doing better. Her guarding no history of stroke is stable. He is here today for neurologic followup.\par \par Follow-up May 5, 2022:\par This is a 76-year-old man who presents today for follow-up of stroke.  He is doing well has no new symptoms.  He continues to take aspirin and Lipitor.  He is here today for neurologic follow-up.

## 2022-05-05 NOTE — CONSULT LETTER
[Dear  ___] : Dear  [unfilled], [Courtesy Letter:] : I had the pleasure of seeing your patient, [unfilled], in my office today. [Please see my note below.] : Please see my note below. [Consult Closing:] : Thank you very much for allowing me to participate in the care of this patient.  If you have any questions, please do not hesitate to contact me. [Sincerely,] : Sincerely, [FreeTextEntry3] : Deny Greenwood M.D., Ph.D. DPN-N\par [facility]\par Neurology [facility] [place]\par Medical Director of Stroke Services\par [facility]\par

## 2022-05-05 NOTE — PHYSICAL EXAM

## 2022-05-16 ENCOUNTER — RX RENEWAL (OUTPATIENT)
Age: 77
End: 2022-05-16

## 2022-05-19 ENCOUNTER — APPOINTMENT (OUTPATIENT)
Dept: INTERNAL MEDICINE | Facility: CLINIC | Age: 77
End: 2022-05-19
Payer: MEDICARE

## 2022-05-19 VITALS
BODY MASS INDEX: 25.9 KG/M2 | HEART RATE: 74 BPM | HEIGHT: 71 IN | SYSTOLIC BLOOD PRESSURE: 140 MMHG | TEMPERATURE: 97 F | OXYGEN SATURATION: 98 % | WEIGHT: 185 LBS | DIASTOLIC BLOOD PRESSURE: 80 MMHG

## 2022-05-19 DIAGNOSIS — R60.0 LOCALIZED EDEMA: ICD-10-CM

## 2022-05-19 DIAGNOSIS — Z00.00 ENCOUNTER FOR GENERAL ADULT MEDICAL EXAMINATION W/OUT ABNORMAL FINDINGS: ICD-10-CM

## 2022-05-19 PROCEDURE — 36415 COLL VENOUS BLD VENIPUNCTURE: CPT

## 2022-05-19 PROCEDURE — 99214 OFFICE O/P EST MOD 30 MIN: CPT | Mod: 25

## 2022-05-19 NOTE — ASSESSMENT
[FreeTextEntry1] : -PMH: H/o CVA (9/2019), HTN, HLD, CKD, H/o Prostate CA (Dx 2013), Bladder CA, Gait Instability \par -SH: Lives at home w/ daughter. Independent of all ADLs/IALDs.\par \par LEOLA is a 76 year M whom is here today for f/u HTN, HLD, Gait instability \par \par Specialists Involved:\par -Neuro: Dr. Deny Greenwood\par -Nephro: Dr. Latisha Velasquez\par -Cardio: Dr. Carmella Crystal\par \par -F/u labs drawn in office today\par -Further recs pending lab results\par -F/u DEXA\par -Continue f/u w/ Cardio (CAD)\par -Continue f/u w/ Neuro (h/o CVA)\par -Continue f/u w/ Nephro (CKD)\par -RTO in 3mo for CPE or sooner if needed. \par

## 2022-05-19 NOTE — HISTORY OF PRESENT ILLNESS
[FreeTextEntry1] : f/u HTN, HLD, Gait instability  [de-identified] : -PMH: H/o CVA (9/2019), HTN, HLD, CKD, H/o Prostate CA (Dx 2013), Bladder CA, Gait Instability \par -SH: Lives at home w/ daughter. Independent of all ADLs/IALDs.\par \par LEOLA is a 76 year M whom is here today for f/u HTN, HLD, Gait instability \par Today, pt reports feeling well\par \par -Gait Instability: Denies recent falls. Lives @ home w/ daughter. Independent of all ADLs/IALDs. Denies difficulties climbing stairs. No grab bar or chair in shower\par \par -H/o CVA 9/2019 w/ no residual deficits: Remains on Aspirin 81mg QD & Atorvastatin 40mg HS. Follows w/ Neuro. No reported changes.\par -HTN: Remains on Losartan 25mg QD. Follows w/ Cardio. No reported changes. \par -HLD: Remains on Lipitor 40mg HS. No reported changes. \par \par -CKD: Knows to avoid NSAIDs. Follows w/ Nephro. \par \par -BPH, Bladder CA: Managed as per Uro whom he will be seeing again in June.

## 2022-05-20 LAB
ALBUMIN SERPL ELPH-MCNC: 4.5 G/DL
ALP BLD-CCNC: 69 U/L
ALT SERPL-CCNC: 19 U/L
ANION GAP SERPL CALC-SCNC: 11 MMOL/L
AST SERPL-CCNC: 17 U/L
BASOPHILS # BLD AUTO: 0.05 K/UL
BASOPHILS NFR BLD AUTO: 1 %
BILIRUB SERPL-MCNC: 0.5 MG/DL
BUN SERPL-MCNC: 21 MG/DL
CALCIUM SERPL-MCNC: 9.9 MG/DL
CHLORIDE SERPL-SCNC: 107 MMOL/L
CHOLEST SERPL-MCNC: 147 MG/DL
CO2 SERPL-SCNC: 28 MMOL/L
CREAT SERPL-MCNC: 1.65 MG/DL
CREAT SPEC-SCNC: 108 MG/DL
EGFR: 43 ML/MIN/1.73M2
EOSINOPHIL # BLD AUTO: 0.17 K/UL
EOSINOPHIL NFR BLD AUTO: 3.5 %
GLUCOSE SERPL-MCNC: 94 MG/DL
HCT VFR BLD CALC: 46.2 %
HDLC SERPL-MCNC: 70 MG/DL
HGB BLD-MCNC: 14.3 G/DL
IMM GRANULOCYTES NFR BLD AUTO: 0.4 %
LDLC SERPL CALC-MCNC: 68 MG/DL
LYMPHOCYTES # BLD AUTO: 1.26 K/UL
LYMPHOCYTES NFR BLD AUTO: 26.3 %
MAN DIFF?: NORMAL
MCHC RBC-ENTMCNC: 29.4 PG
MCHC RBC-ENTMCNC: 31 GM/DL
MCV RBC AUTO: 94.9 FL
MICROALBUMIN 24H UR DL<=1MG/L-MCNC: 2.3 MG/DL
MICROALBUMIN/CREAT 24H UR-RTO: 21 MG/G
MONOCYTES # BLD AUTO: 0.56 K/UL
MONOCYTES NFR BLD AUTO: 11.7 %
NEUTROPHILS # BLD AUTO: 2.73 K/UL
NEUTROPHILS NFR BLD AUTO: 57.1 %
NONHDLC SERPL-MCNC: 76 MG/DL
PLATELET # BLD AUTO: 180 K/UL
POTASSIUM SERPL-SCNC: 5 MMOL/L
PROT SERPL-MCNC: 6.8 G/DL
RBC # BLD: 4.87 M/UL
RBC # FLD: 13.8 %
SODIUM SERPL-SCNC: 145 MMOL/L
TRIGL SERPL-MCNC: 43 MG/DL
WBC # FLD AUTO: 4.79 K/UL

## 2022-05-25 NOTE — H&P PST ADULT - PULMONARY EMBOLUS
[phone]    Called and spoke to wife Jeffery Pepper, informed that referral for Neurosurgery is authorized. Jeffery Pepper took down Dr. Kailee Guerrero' information and will call to schedule an appointment. [address], confirmed that they can see the referral is authorized. No need to fax any information to them. Encounter closed. no

## 2022-07-06 NOTE — H&P PST ADULT - SKIN/BREAST
Harmeet Jacobs was admitted to [facility] from PACU via cart accompanied by daughter.   Reason for hospitalization is UTI & kidney stone.   Upon arrival, patient is stable. Patient has history significant for sleep apnea, DM, mild cognitive delay, obesity & asthma.  Patient oriented to bed, call light, , room and unit.  Patient provided with the following educational materials upon admission:safety, advanced directives, infection control and pain.   Level of understanding patient verbalized understanding.   Admission orders received at this time. See Epic documentation for patient individualized nursing care plan. Skin assess and cosign with Sho MURRAY.     negative

## 2022-08-26 PROBLEM — Z13.820 OSTEOPOROSIS SCREENING: Status: ACTIVE | Noted: 2022-01-04

## 2022-08-26 PROBLEM — I10 ESSENTIAL HYPERTENSION: Status: ACTIVE | Noted: 2021-01-05

## 2022-08-26 PROBLEM — Z13.29 SCREENING FOR THYROID DISORDER: Status: ACTIVE | Noted: 2022-01-01

## 2022-08-26 PROBLEM — Z12.5 SCREENING FOR PROSTATE CANCER: Status: ACTIVE | Noted: 2022-01-01

## 2022-08-26 PROBLEM — E78.5 HYPERLIPIDEMIA, UNSPECIFIED HYPERLIPIDEMIA TYPE: Status: ACTIVE | Noted: 2020-09-18

## 2022-08-26 PROBLEM — N18.9 CKD (CHRONIC KIDNEY DISEASE): Status: ACTIVE | Noted: 2021-07-14

## 2022-08-26 PROBLEM — R26.81 GAIT INSTABILITY: Status: ACTIVE | Noted: 2021-05-05

## 2022-08-26 NOTE — HISTORY OF PRESENT ILLNESS
[FreeTextEntry1] : annual well check  [de-identified] : -PMH: H/o CVA (9/2019), HTN, HLD, CKD, H/o Prostate CA (Dx 2013), Bladder CA, Gait Instability \par -SH: . 3 children. Lives at home w/ daughter. Independent of all ADLs/IALDs. Non-smoker. No EtOH Use.\par \par LEOLA is a 76 year M whom is here today for an annual well check\par \par Specialists Involved:\par -Neuro: Dr. Deny Greenwood\par -Nephro: Dr. Latisha Velasquez\par -Cardio: Dr. Carmella Crystal\par \par -Vaccines: Needs PPSV 23, Flu, Shingles, TDap (Declines all despite recs)\par -DEXA: Script given today\par -FH of Prostate, Colon, breast or ovarian CA: None known\par \par -Gait Instability: Denies recent falls. Lives @ home w/ daughter. Independent of all ADLs/IALDs. Denies difficulties climbing stairs. No grab bar or chair in shower\par \par -H/o CVA 9/2019 w/ no residual deficits: Remains on Aspirin 81mg QD & Atorvastatin 40mg HS. Follows w/ Neuro. No reported changes.\par -HTN: Now on Amlodipine 2.5mg QD. Remains on Losartan 25mg QD. Follows w/ Cardio. No reported changes. \par -HLD: Remains on Lipitor 40mg HS. No reported changes. \par \par -CKD: Knows to avoid NSAIDs. Follows w/ Nephro. \par \par -BPH, Bladder CA: Managed as per Uro whom he will be seeing again in June.

## 2022-08-26 NOTE — HEALTH RISK ASSESSMENT
[Never] : Never [No] : In the past 12 months have you used drugs other than those required for medical reasons? No [No falls in past year] : Patient reported no falls in the past year [0] : 2) Feeling down, depressed, or hopeless: Not at all (0) [PHQ-2 Negative - No further assessment needed] : PHQ-2 Negative - No further assessment needed [Audit-CScore] : 0 [[facility]] : 12 [[id number]] : 0 [HIV test declined] : HIV test declined [Hepatitis C test declined] : Hepatitis C test declined [Change in mental status noted] : No change in mental status noted [None] : None [With Family] : lives with family [Retired] : retired [] :  [# Of Children ___] : has [unfilled] children [Fully functional (bathing, dressing, toileting, transferring, walking, feeding)] : Fully functional (bathing, dressing, toileting, transferring, walking, feeding) [Fully functional (using the telephone, shopping, preparing meals, housekeeping, doing laundry, using] : Fully functional and needs no help or supervision to perform IADLs (using the telephone, shopping, preparing meals, housekeeping, doing laundry, using transportation, managing medications and managing finances) [BoneDensityComments] : Script given today [AdvancecareDate] : 08/22

## 2022-08-26 NOTE — ASSESSMENT
[FreeTextEntry1] : -PMH: H/o CVA (9/2019), HTN, HLD, CKD, H/o Prostate CA (Dx 2013), Bladder CA, Gait Instability \par -SH: . 3 children. Lives at home w/ daughter. Independent of all ADLs/IALDs. Non-smoker. No EtOH Use.\par \par LEOLA is a 76 year M whom is here today for an annual well check\par \par Specialists Involved:\par -Neuro: Dr. Deny Greenwood\par -Nephro: Dr. Latisha Velasquez\par -Cardio: Dr. Carmella Crystal\par \par -F/u labs drawn in office today\par -Further recs pending lab results\par -check [place] Vaccine registry\par -F/u DEXA\par -Overdue for f/u w/ Cardio (CAD)\par -Overdue for f/u w/ Nephro (CKD)\par -Continue f/u w/ Neuro (h/o CVA)\par -RTO 6mo for routine f/u & labs or sooner if needed

## 2022-08-29 PROBLEM — R73.03 PRE-DIABETES: Status: ACTIVE | Noted: 2022-01-01

## 2022-08-29 PROBLEM — Z13.1 SCREENING FOR DIABETES MELLITUS: Status: RESOLVED | Noted: 2022-01-01 | Resolved: 2022-01-01

## 2022-11-16 PROBLEM — R30.0 DYSURIA: Status: ACTIVE | Noted: 2022-01-01

## 2022-11-16 PROBLEM — Z85.46 HISTORY OF MALIGNANT NEOPLASM OF PROSTATE: Status: RESOLVED | Noted: 2020-09-18 | Resolved: 2022-01-01

## 2022-11-16 NOTE — HISTORY OF PRESENT ILLNESS
[FreeTextEntry1] : He is a 77 year old man who is seen today in follow up. He is complaining of burning with urination for a few days. Nocturia is 2-3 times. he underwent radiation for prostate cancer around 2012. PSA was 0.44 in Aug 2022. 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 he has received intravesical treatment and maintenance therapy and he is supposed to start the next course of 3 weeks maintenance therapy with Gemcitabine soon. PVR was 100 ml today.

## 2022-11-16 NOTE — ASSESSMENT
[FreeTextEntry1] : His examination is unremarkable, he empties bladder well. PSA was low. UA and culture will be sent, will discuss results on the phone. In the near future he will start the next course of intravesical maintenance therapy.

## 2022-11-16 NOTE — PHYSICAL EXAM
[General Appearance - Well Developed] : well developed [General Appearance - Well Nourished] : well nourished [Normal Appearance] : normal appearance [Well Groomed] : well groomed [General Appearance - In No Acute Distress] : no acute distress [Abdomen Soft] : soft [Abdomen Tenderness] : non-tender [Costovertebral Angle Tenderness] : no ~M costovertebral angle tenderness [Urethral Meatus] : meatus normal [Penis Abnormality] : normal circumcised penis [Urinary Bladder Findings] : the bladder was normal on palpation [Scrotum] : the scrotum was normal [Testes Mass (___cm)] : there were no testicular masses [] : no respiratory distress [Respiration, Rhythm And Depth] : normal respiratory rhythm and effort [Exaggerated Use Of Accessory Muscles For Inspiration] : no accessory muscle use [Inguinal Lymph Nodes Enlarged Bilaterally] : inguinal

## 2022-12-19 PROBLEM — C67.8 MALIGNANT NEOPLASM OF OVERLAPPING SITES OF BLADDER: Status: ACTIVE | Noted: 2021-01-22

## 2023-01-01 ENCOUNTER — NON-APPOINTMENT (OUTPATIENT)
Age: 78
End: 2023-01-01

## 2023-01-01 ENCOUNTER — TRANSCRIPTION ENCOUNTER (OUTPATIENT)
Age: 78
End: 2023-01-01

## 2023-01-01 ENCOUNTER — INPATIENT (INPATIENT)
Facility: HOSPITAL | Age: 78
LOS: 8 days | Discharge: NURSING, REHAB OR LONG-TERM CARE | DRG: 682 | End: 2023-02-18
Attending: STUDENT IN AN ORGANIZED HEALTH CARE EDUCATION/TRAINING PROGRAM | Admitting: INTERNAL MEDICINE
Payer: COMMERCIAL

## 2023-01-01 ENCOUNTER — RX RENEWAL (OUTPATIENT)
Age: 78
End: 2023-01-01

## 2023-01-01 ENCOUNTER — APPOINTMENT (OUTPATIENT)
Dept: INTERNAL MEDICINE | Facility: CLINIC | Age: 78
End: 2023-01-01

## 2023-01-01 ENCOUNTER — APPOINTMENT (OUTPATIENT)
Dept: UROLOGY | Facility: CLINIC | Age: 78
End: 2023-01-01

## 2023-01-01 ENCOUNTER — APPOINTMENT (OUTPATIENT)
Dept: NEUROLOGY | Facility: CLINIC | Age: 78
End: 2023-01-01

## 2023-01-01 VITALS
TEMPERATURE: 99 F | DIASTOLIC BLOOD PRESSURE: 64 MMHG | SYSTOLIC BLOOD PRESSURE: 119 MMHG | RESPIRATION RATE: 18 BRPM | HEART RATE: 100 BPM | OXYGEN SATURATION: 97 %

## 2023-01-01 VITALS
RESPIRATION RATE: 16 BRPM | TEMPERATURE: 98 F | DIASTOLIC BLOOD PRESSURE: 82 MMHG | OXYGEN SATURATION: 100 % | HEART RATE: 88 BPM | HEIGHT: 71 IN | SYSTOLIC BLOOD PRESSURE: 159 MMHG

## 2023-01-01 DIAGNOSIS — C67.9 MALIGNANT NEOPLASM OF BLADDER, UNSPECIFIED: Chronic | ICD-10-CM

## 2023-01-01 DIAGNOSIS — Z98.890 OTHER SPECIFIED POSTPROCEDURAL STATES: Chronic | ICD-10-CM

## 2023-01-01 DIAGNOSIS — Z90.79 ACQUIRED ABSENCE OF OTHER GENITAL ORGAN(S): Chronic | ICD-10-CM

## 2023-01-01 DIAGNOSIS — Z98.49 CATARACT EXTRACTION STATUS, UNSPECIFIED EYE: Chronic | ICD-10-CM

## 2023-01-01 DIAGNOSIS — N13.9 OBSTRUCTIVE AND REFLUX UROPATHY, UNSPECIFIED: ICD-10-CM

## 2023-01-01 DIAGNOSIS — Z87.828 PERSONAL HISTORY OF OTHER (HEALED) PHYSICAL INJURY AND TRAUMA: Chronic | ICD-10-CM

## 2023-01-01 LAB
24R-OH-CALCIDIOL SERPL-MCNC: 41.4 NG/ML — SIGNIFICANT CHANGE UP (ref 30–80)
ALBUMIN SERPL ELPH-MCNC: 3.5 G/DL — SIGNIFICANT CHANGE UP (ref 3.3–5.2)
ALBUMIN SERPL ELPH-MCNC: 3.6 G/DL — SIGNIFICANT CHANGE UP (ref 3.3–5.2)
ALBUMIN SERPL ELPH-MCNC: 3.7 G/DL — SIGNIFICANT CHANGE UP (ref 3.3–5.2)
ALP SERPL-CCNC: 69 U/L — SIGNIFICANT CHANGE UP (ref 40–120)
ALP SERPL-CCNC: 71 U/L — SIGNIFICANT CHANGE UP (ref 40–120)
ALP SERPL-CCNC: 71 U/L — SIGNIFICANT CHANGE UP (ref 40–120)
ALT FLD-CCNC: 11 U/L — SIGNIFICANT CHANGE UP
ALT FLD-CCNC: 12 U/L — SIGNIFICANT CHANGE UP
ALT FLD-CCNC: 13 U/L — SIGNIFICANT CHANGE UP
ANION GAP SERPL CALC-SCNC: 10 MMOL/L — SIGNIFICANT CHANGE UP (ref 5–17)
ANION GAP SERPL CALC-SCNC: 10 MMOL/L — SIGNIFICANT CHANGE UP (ref 5–17)
ANION GAP SERPL CALC-SCNC: 11 MMOL/L — SIGNIFICANT CHANGE UP (ref 5–17)
ANION GAP SERPL CALC-SCNC: 12 MMOL/L — SIGNIFICANT CHANGE UP (ref 5–17)
ANION GAP SERPL CALC-SCNC: 14 MMOL/L — SIGNIFICANT CHANGE UP (ref 5–17)
ANION GAP SERPL CALC-SCNC: 16 MMOL/L — SIGNIFICANT CHANGE UP (ref 5–17)
ANION GAP SERPL CALC-SCNC: 21 MMOL/L — HIGH (ref 5–17)
ANION GAP SERPL CALC-SCNC: 22 MMOL/L — HIGH (ref 5–17)
ANION GAP SERPL CALC-SCNC: 23 MMOL/L — HIGH (ref 5–17)
ANION GAP SERPL CALC-SCNC: 23 MMOL/L — HIGH (ref 5–17)
ANION GAP SERPL CALC-SCNC: 25 MMOL/L — HIGH (ref 5–17)
ANION GAP SERPL CALC-SCNC: 9 MMOL/L — SIGNIFICANT CHANGE UP (ref 5–17)
APPEARANCE UR: CLEAR — SIGNIFICANT CHANGE UP
AST SERPL-CCNC: 10 U/L — SIGNIFICANT CHANGE UP
AST SERPL-CCNC: 10 U/L — SIGNIFICANT CHANGE UP
AST SERPL-CCNC: 9 U/L — SIGNIFICANT CHANGE UP
BACTERIA # UR AUTO: ABNORMAL
BASE EXCESS BLDV CALC-SCNC: -10.1 MMOL/L — LOW (ref -2–3)
BASOPHILS # BLD AUTO: 0.03 K/UL — SIGNIFICANT CHANGE UP (ref 0–0.2)
BASOPHILS # BLD AUTO: 0.03 K/UL — SIGNIFICANT CHANGE UP (ref 0–0.2)
BASOPHILS # BLD AUTO: 0.06 K/UL — SIGNIFICANT CHANGE UP (ref 0–0.2)
BASOPHILS NFR BLD AUTO: 0.4 % — SIGNIFICANT CHANGE UP (ref 0–2)
BASOPHILS NFR BLD AUTO: 0.5 % — SIGNIFICANT CHANGE UP (ref 0–2)
BASOPHILS NFR BLD AUTO: 0.7 % — SIGNIFICANT CHANGE UP (ref 0–2)
BILIRUB SERPL-MCNC: 0.3 MG/DL — LOW (ref 0.4–2)
BILIRUB SERPL-MCNC: 0.3 MG/DL — LOW (ref 0.4–2)
BILIRUB SERPL-MCNC: 0.4 MG/DL — SIGNIFICANT CHANGE UP (ref 0.4–2)
BILIRUB UR-MCNC: NEGATIVE — SIGNIFICANT CHANGE UP
BUN SERPL-MCNC: 105.2 MG/DL — HIGH (ref 8–20)
BUN SERPL-MCNC: 121 MG/DL — HIGH (ref 8–20)
BUN SERPL-MCNC: 142.6 MG/DL — HIGH (ref 8–20)
BUN SERPL-MCNC: 143.2 MG/DL — HIGH (ref 8–20)
BUN SERPL-MCNC: 150.4 MG/DL — HIGH (ref 8–20)
BUN SERPL-MCNC: 160.2 MG/DL — HIGH (ref 8–20)
BUN SERPL-MCNC: 161.4 MG/DL — HIGH (ref 8–20)
BUN SERPL-MCNC: 19 MG/DL — SIGNIFICANT CHANGE UP (ref 8–20)
BUN SERPL-MCNC: 19.4 MG/DL — SIGNIFICANT CHANGE UP (ref 8–20)
BUN SERPL-MCNC: 24.3 MG/DL — HIGH (ref 8–20)
BUN SERPL-MCNC: 24.7 MG/DL — HIGH (ref 8–20)
BUN SERPL-MCNC: 37.7 MG/DL — HIGH (ref 8–20)
BUN SERPL-MCNC: 53.7 MG/DL — HIGH (ref 8–20)
BUN SERPL-MCNC: 82.8 MG/DL — HIGH (ref 8–20)
CA-I SERPL-SCNC: 1.2 MMOL/L — SIGNIFICANT CHANGE UP (ref 1.15–1.33)
CALCIUM SERPL-MCNC: 8.5 MG/DL — SIGNIFICANT CHANGE UP (ref 8.4–10.5)
CALCIUM SERPL-MCNC: 8.5 MG/DL — SIGNIFICANT CHANGE UP (ref 8.4–10.5)
CALCIUM SERPL-MCNC: 8.7 MG/DL — SIGNIFICANT CHANGE UP (ref 8.4–10.5)
CALCIUM SERPL-MCNC: 8.7 MG/DL — SIGNIFICANT CHANGE UP (ref 8.4–10.5)
CALCIUM SERPL-MCNC: 8.8 MG/DL — SIGNIFICANT CHANGE UP (ref 8.4–10.5)
CALCIUM SERPL-MCNC: 8.9 MG/DL — SIGNIFICANT CHANGE UP (ref 8.4–10.5)
CALCIUM SERPL-MCNC: 8.9 MG/DL — SIGNIFICANT CHANGE UP (ref 8.4–10.5)
CALCIUM SERPL-MCNC: 9 MG/DL — SIGNIFICANT CHANGE UP (ref 8.4–10.5)
CALCIUM SERPL-MCNC: 9.2 MG/DL — SIGNIFICANT CHANGE UP (ref 8.4–10.5)
CALCIUM SERPL-MCNC: 9.4 MG/DL — SIGNIFICANT CHANGE UP (ref 8.4–10.5)
CHLORIDE BLDV-SCNC: 98 MMOL/L — SIGNIFICANT CHANGE UP (ref 96–108)
CHLORIDE SERPL-SCNC: 102 MMOL/L — SIGNIFICANT CHANGE UP (ref 96–108)
CHLORIDE SERPL-SCNC: 104 MMOL/L — SIGNIFICANT CHANGE UP (ref 96–108)
CHLORIDE SERPL-SCNC: 104 MMOL/L — SIGNIFICANT CHANGE UP (ref 96–108)
CHLORIDE SERPL-SCNC: 108 MMOL/L — SIGNIFICANT CHANGE UP (ref 96–108)
CHLORIDE SERPL-SCNC: 110 MMOL/L — HIGH (ref 96–108)
CHLORIDE SERPL-SCNC: 92 MMOL/L — LOW (ref 96–108)
CHLORIDE SERPL-SCNC: 93 MMOL/L — LOW (ref 96–108)
CHLORIDE SERPL-SCNC: 93 MMOL/L — LOW (ref 96–108)
CHLORIDE SERPL-SCNC: 94 MMOL/L — LOW (ref 96–108)
CHLORIDE SERPL-SCNC: 94 MMOL/L — LOW (ref 96–108)
CHLORIDE SERPL-SCNC: 97 MMOL/L — SIGNIFICANT CHANGE UP (ref 96–108)
CHLORIDE SERPL-SCNC: 98 MMOL/L — SIGNIFICANT CHANGE UP (ref 96–108)
CO2 SERPL-SCNC: 14 MMOL/L — LOW (ref 22–29)
CO2 SERPL-SCNC: 16 MMOL/L — LOW (ref 22–29)
CO2 SERPL-SCNC: 18 MMOL/L — LOW (ref 22–29)
CO2 SERPL-SCNC: 19 MMOL/L — LOW (ref 22–29)
CO2 SERPL-SCNC: 19 MMOL/L — LOW (ref 22–29)
CO2 SERPL-SCNC: 20 MMOL/L — LOW (ref 22–29)
CO2 SERPL-SCNC: 22 MMOL/L — SIGNIFICANT CHANGE UP (ref 22–29)
CO2 SERPL-SCNC: 23 MMOL/L — SIGNIFICANT CHANGE UP (ref 22–29)
CO2 SERPL-SCNC: 23 MMOL/L — SIGNIFICANT CHANGE UP (ref 22–29)
CO2 SERPL-SCNC: 24 MMOL/L — SIGNIFICANT CHANGE UP (ref 22–29)
CO2 SERPL-SCNC: 25 MMOL/L — SIGNIFICANT CHANGE UP (ref 22–29)
CO2 SERPL-SCNC: 33 MMOL/L — HIGH (ref 22–29)
COLOR SPEC: YELLOW — SIGNIFICANT CHANGE UP
CREAT SERPL-MCNC: 13.48 MG/DL — HIGH (ref 0.5–1.3)
CREAT SERPL-MCNC: 16.03 MG/DL — HIGH (ref 0.5–1.3)
CREAT SERPL-MCNC: 19.22 MG/DL — HIGH (ref 0.5–1.3)
CREAT SERPL-MCNC: 2.76 MG/DL — HIGH (ref 0.5–1.3)
CREAT SERPL-MCNC: 26.3 MG/DL — HIGH (ref 0.5–1.3)
CREAT SERPL-MCNC: 27.98 MG/DL — HIGH (ref 0.5–1.3)
CREAT SERPL-MCNC: 3.08 MG/DL — HIGH (ref 0.5–1.3)
CREAT SERPL-MCNC: 3.55 MG/DL — HIGH (ref 0.5–1.3)
CREAT SERPL-MCNC: 3.82 MG/DL — HIGH (ref 0.5–1.3)
CREAT SERPL-MCNC: 30.24 MG/DL — HIGH (ref 0.5–1.3)
CREAT SERPL-MCNC: 31.56 MG/DL — HIGH (ref 0.5–1.3)
CREAT SERPL-MCNC: 31.88 MG/DL — HIGH (ref 0.5–1.3)
CREAT SERPL-MCNC: 5.18 MG/DL — HIGH (ref 0.5–1.3)
CREAT SERPL-MCNC: 7.76 MG/DL — HIGH (ref 0.5–1.3)
CULTURE RESULTS: NO GROWTH — SIGNIFICANT CHANGE UP
CULTURE RESULTS: SIGNIFICANT CHANGE UP
CULTURE RESULTS: SIGNIFICANT CHANGE UP
DIFF PNL FLD: ABNORMAL
EGFR: 1 ML/MIN/1.73M2 — LOW
EGFR: 11 ML/MIN/1.73M2 — LOW
EGFR: 16 ML/MIN/1.73M2 — LOW
EGFR: 17 ML/MIN/1.73M2 — LOW
EGFR: 2 ML/MIN/1.73M2 — LOW
EGFR: 2 ML/MIN/1.73M2 — LOW
EGFR: 20 ML/MIN/1.73M2 — LOW
EGFR: 23 ML/MIN/1.73M2 — LOW
EGFR: 3 ML/MIN/1.73M2 — LOW
EGFR: 3 ML/MIN/1.73M2 — LOW
EGFR: 7 ML/MIN/1.73M2 — LOW
EOSINOPHIL # BLD AUTO: 0.08 K/UL — SIGNIFICANT CHANGE UP (ref 0–0.5)
EOSINOPHIL # BLD AUTO: 0.1 K/UL — SIGNIFICANT CHANGE UP (ref 0–0.5)
EOSINOPHIL # BLD AUTO: 0.29 K/UL — SIGNIFICANT CHANGE UP (ref 0–0.5)
EOSINOPHIL NFR BLD AUTO: 1.3 % — SIGNIFICANT CHANGE UP (ref 0–6)
EOSINOPHIL NFR BLD AUTO: 1.4 % — SIGNIFICANT CHANGE UP (ref 0–6)
EOSINOPHIL NFR BLD AUTO: 3.3 % — SIGNIFICANT CHANGE UP (ref 0–6)
EPI CELLS # UR: SIGNIFICANT CHANGE UP
FLUAV AG NPH QL: SIGNIFICANT CHANGE UP
FLUBV AG NPH QL: SIGNIFICANT CHANGE UP
GAS PNL BLDA: SIGNIFICANT CHANGE UP
GAS PNL BLDV: 129 MMOL/L — LOW (ref 136–145)
GAS PNL BLDV: SIGNIFICANT CHANGE UP
GAS PNL BLDV: SIGNIFICANT CHANGE UP
GLUCOSE BLDC GLUCOMTR-MCNC: 102 MG/DL — HIGH (ref 70–99)
GLUCOSE BLDC GLUCOMTR-MCNC: 103 MG/DL — HIGH (ref 70–99)
GLUCOSE BLDC GLUCOMTR-MCNC: 103 MG/DL — HIGH (ref 70–99)
GLUCOSE BLDC GLUCOMTR-MCNC: 112 MG/DL — HIGH (ref 70–99)
GLUCOSE BLDC GLUCOMTR-MCNC: 121 MG/DL — HIGH (ref 70–99)
GLUCOSE BLDC GLUCOMTR-MCNC: 122 MG/DL — HIGH (ref 70–99)
GLUCOSE BLDC GLUCOMTR-MCNC: 124 MG/DL — HIGH (ref 70–99)
GLUCOSE BLDC GLUCOMTR-MCNC: 124 MG/DL — HIGH (ref 70–99)
GLUCOSE BLDC GLUCOMTR-MCNC: 125 MG/DL — HIGH (ref 70–99)
GLUCOSE BLDC GLUCOMTR-MCNC: 127 MG/DL — HIGH (ref 70–99)
GLUCOSE BLDC GLUCOMTR-MCNC: 128 MG/DL — HIGH (ref 70–99)
GLUCOSE BLDC GLUCOMTR-MCNC: 132 MG/DL — HIGH (ref 70–99)
GLUCOSE BLDC GLUCOMTR-MCNC: 132 MG/DL — HIGH (ref 70–99)
GLUCOSE BLDC GLUCOMTR-MCNC: 134 MG/DL — HIGH (ref 70–99)
GLUCOSE BLDC GLUCOMTR-MCNC: 136 MG/DL — HIGH (ref 70–99)
GLUCOSE BLDC GLUCOMTR-MCNC: 137 MG/DL — HIGH (ref 70–99)
GLUCOSE BLDC GLUCOMTR-MCNC: 138 MG/DL — HIGH (ref 70–99)
GLUCOSE BLDC GLUCOMTR-MCNC: 140 MG/DL — HIGH (ref 70–99)
GLUCOSE BLDC GLUCOMTR-MCNC: 145 MG/DL — HIGH (ref 70–99)
GLUCOSE BLDC GLUCOMTR-MCNC: 159 MG/DL — HIGH (ref 70–99)
GLUCOSE BLDC GLUCOMTR-MCNC: 169 MG/DL — HIGH (ref 70–99)
GLUCOSE BLDC GLUCOMTR-MCNC: 171 MG/DL — HIGH (ref 70–99)
GLUCOSE BLDC GLUCOMTR-MCNC: 179 MG/DL — HIGH (ref 70–99)
GLUCOSE BLDC GLUCOMTR-MCNC: 192 MG/DL — HIGH (ref 70–99)
GLUCOSE BLDC GLUCOMTR-MCNC: 242 MG/DL — HIGH (ref 70–99)
GLUCOSE BLDC GLUCOMTR-MCNC: 62 MG/DL — LOW (ref 70–99)
GLUCOSE BLDC GLUCOMTR-MCNC: 92 MG/DL — SIGNIFICANT CHANGE UP (ref 70–99)
GLUCOSE BLDV-MCNC: 46 MG/DL — CRITICAL LOW (ref 70–99)
GLUCOSE SERPL-MCNC: 102 MG/DL — HIGH (ref 70–99)
GLUCOSE SERPL-MCNC: 111 MG/DL — HIGH (ref 70–99)
GLUCOSE SERPL-MCNC: 112 MG/DL — HIGH (ref 70–99)
GLUCOSE SERPL-MCNC: 116 MG/DL — HIGH (ref 70–99)
GLUCOSE SERPL-MCNC: 129 MG/DL — HIGH (ref 70–99)
GLUCOSE SERPL-MCNC: 135 MG/DL — HIGH (ref 70–99)
GLUCOSE SERPL-MCNC: 140 MG/DL — HIGH (ref 70–99)
GLUCOSE SERPL-MCNC: 157 MG/DL — HIGH (ref 70–99)
GLUCOSE SERPL-MCNC: 170 MG/DL — HIGH (ref 70–99)
GLUCOSE SERPL-MCNC: 176 MG/DL — HIGH (ref 70–99)
GLUCOSE SERPL-MCNC: 209 MG/DL — HIGH (ref 70–99)
GLUCOSE SERPL-MCNC: 46 MG/DL — CRITICAL LOW (ref 70–99)
GLUCOSE SERPL-MCNC: 86 MG/DL — SIGNIFICANT CHANGE UP (ref 70–99)
GLUCOSE SERPL-MCNC: 93 MG/DL — SIGNIFICANT CHANGE UP (ref 70–99)
GLUCOSE UR QL: 50 MG/DL
HBV CORE AB SER-ACNC: SIGNIFICANT CHANGE UP
HBV SURFACE AB SER-ACNC: <3 MIU/ML — LOW
HBV SURFACE AB SER-ACNC: SIGNIFICANT CHANGE UP
HBV SURFACE AG SER-ACNC: SIGNIFICANT CHANGE UP
HCO3 BLDV-SCNC: 17 MMOL/L — LOW (ref 22–29)
HCT VFR BLD CALC: 36.9 % — LOW (ref 39–50)
HCT VFR BLD CALC: 37 % — LOW (ref 39–50)
HCT VFR BLD CALC: 38.3 % — LOW (ref 39–50)
HCT VFR BLD CALC: 39.2 % — SIGNIFICANT CHANGE UP (ref 39–50)
HCT VFR BLD CALC: 40.3 % — SIGNIFICANT CHANGE UP (ref 39–50)
HCT VFR BLD CALC: 40.5 % — SIGNIFICANT CHANGE UP (ref 39–50)
HCT VFR BLD CALC: 42 % — SIGNIFICANT CHANGE UP (ref 39–50)
HCT VFR BLD CALC: 42.6 % — SIGNIFICANT CHANGE UP (ref 39–50)
HCT VFR BLDA CALC: 38 % — SIGNIFICANT CHANGE UP
HCV AB S/CO SERPL IA: 0.09 S/CO — SIGNIFICANT CHANGE UP (ref 0–0.99)
HCV AB SERPL-IMP: SIGNIFICANT CHANGE UP
HGB BLD CALC-MCNC: 12.7 G/DL — SIGNIFICANT CHANGE UP (ref 12.6–17.4)
HGB BLD-MCNC: 11.9 G/DL — LOW (ref 13–17)
HGB BLD-MCNC: 12.1 G/DL — LOW (ref 13–17)
HGB BLD-MCNC: 12.1 G/DL — LOW (ref 13–17)
HGB BLD-MCNC: 12.3 G/DL — LOW (ref 13–17)
HGB BLD-MCNC: 12.9 G/DL — LOW (ref 13–17)
HGB BLD-MCNC: 13.1 G/DL — SIGNIFICANT CHANGE UP (ref 13–17)
HGB BLD-MCNC: 13.6 G/DL — SIGNIFICANT CHANGE UP (ref 13–17)
HGB BLD-MCNC: 13.9 G/DL — SIGNIFICANT CHANGE UP (ref 13–17)
IMM GRANULOCYTES NFR BLD AUTO: 0.3 % — SIGNIFICANT CHANGE UP (ref 0–0.9)
IMM GRANULOCYTES NFR BLD AUTO: 0.3 % — SIGNIFICANT CHANGE UP (ref 0–0.9)
IMM GRANULOCYTES NFR BLD AUTO: 0.9 % — SIGNIFICANT CHANGE UP (ref 0–0.9)
KETONES UR-MCNC: ABNORMAL
LACTATE BLDV-MCNC: 1.8 MMOL/L — SIGNIFICANT CHANGE UP (ref 0.5–2)
LEUKOCYTE ESTERASE UR-ACNC: ABNORMAL
LYMPHOCYTES # BLD AUTO: 0.54 K/UL — LOW (ref 1–3.3)
LYMPHOCYTES # BLD AUTO: 0.63 K/UL — LOW (ref 1–3.3)
LYMPHOCYTES # BLD AUTO: 1.22 K/UL — SIGNIFICANT CHANGE UP (ref 1–3.3)
LYMPHOCYTES # BLD AUTO: 14.1 % — SIGNIFICANT CHANGE UP (ref 13–44)
LYMPHOCYTES # BLD AUTO: 8.7 % — LOW (ref 13–44)
LYMPHOCYTES # BLD AUTO: 9 % — LOW (ref 13–44)
MAGNESIUM SERPL-MCNC: 1.7 MG/DL — SIGNIFICANT CHANGE UP (ref 1.6–2.6)
MAGNESIUM SERPL-MCNC: 1.7 MG/DL — SIGNIFICANT CHANGE UP (ref 1.6–2.6)
MAGNESIUM SERPL-MCNC: 2.1 MG/DL — SIGNIFICANT CHANGE UP (ref 1.6–2.6)
MAGNESIUM SERPL-MCNC: 2.6 MG/DL — SIGNIFICANT CHANGE UP (ref 1.6–2.6)
MAGNESIUM SERPL-MCNC: 2.7 MG/DL — HIGH (ref 1.6–2.6)
MAGNESIUM SERPL-MCNC: 3 MG/DL — HIGH (ref 1.6–2.6)
MAGNESIUM SERPL-MCNC: 3 MG/DL — HIGH (ref 1.6–2.6)
MCHC RBC-ENTMCNC: 29 PG — SIGNIFICANT CHANGE UP (ref 27–34)
MCHC RBC-ENTMCNC: 29.1 PG — SIGNIFICANT CHANGE UP (ref 27–34)
MCHC RBC-ENTMCNC: 29.3 PG — SIGNIFICANT CHANGE UP (ref 27–34)
MCHC RBC-ENTMCNC: 29.4 PG — SIGNIFICANT CHANGE UP (ref 27–34)
MCHC RBC-ENTMCNC: 29.6 PG — SIGNIFICANT CHANGE UP (ref 27–34)
MCHC RBC-ENTMCNC: 29.6 PG — SIGNIFICANT CHANGE UP (ref 27–34)
MCHC RBC-ENTMCNC: 29.7 PG — SIGNIFICANT CHANGE UP (ref 27–34)
MCHC RBC-ENTMCNC: 30.9 GM/DL — LOW (ref 32–36)
MCHC RBC-ENTMCNC: 31.1 GM/DL — LOW (ref 32–36)
MCHC RBC-ENTMCNC: 31.2 GM/DL — LOW (ref 32–36)
MCHC RBC-ENTMCNC: 32 GM/DL — SIGNIFICANT CHANGE UP (ref 32–36)
MCHC RBC-ENTMCNC: 32.8 GM/DL — SIGNIFICANT CHANGE UP (ref 32–36)
MCHC RBC-ENTMCNC: 33.2 GM/DL — SIGNIFICANT CHANGE UP (ref 32–36)
MCHC RBC-ENTMCNC: 33.6 GM/DL — SIGNIFICANT CHANGE UP (ref 32–36)
MCV RBC AUTO: 88 FL — SIGNIFICANT CHANGE UP (ref 80–100)
MCV RBC AUTO: 88.5 FL — SIGNIFICANT CHANGE UP (ref 80–100)
MCV RBC AUTO: 88.7 FL — SIGNIFICANT CHANGE UP (ref 80–100)
MCV RBC AUTO: 92.6 FL — SIGNIFICANT CHANGE UP (ref 80–100)
MCV RBC AUTO: 94 FL — SIGNIFICANT CHANGE UP (ref 80–100)
MCV RBC AUTO: 94.3 FL — SIGNIFICANT CHANGE UP (ref 80–100)
MCV RBC AUTO: 94.8 FL — SIGNIFICANT CHANGE UP (ref 80–100)
MONOCYTES # BLD AUTO: 0.5 K/UL — SIGNIFICANT CHANGE UP (ref 0–0.9)
MONOCYTES # BLD AUTO: 0.51 K/UL — SIGNIFICANT CHANGE UP (ref 0–0.9)
MONOCYTES # BLD AUTO: 0.92 K/UL — HIGH (ref 0–0.9)
MONOCYTES NFR BLD AUTO: 10.6 % — SIGNIFICANT CHANGE UP (ref 2–14)
MONOCYTES NFR BLD AUTO: 7.3 % — SIGNIFICANT CHANGE UP (ref 2–14)
MONOCYTES NFR BLD AUTO: 8 % — SIGNIFICANT CHANGE UP (ref 2–14)
MRSA PCR RESULT.: SIGNIFICANT CHANGE UP
NEUTROPHILS # BLD AUTO: 5.06 K/UL — SIGNIFICANT CHANGE UP (ref 1.8–7.4)
NEUTROPHILS # BLD AUTO: 5.68 K/UL — SIGNIFICANT CHANGE UP (ref 1.8–7.4)
NEUTROPHILS # BLD AUTO: 6.09 K/UL — SIGNIFICANT CHANGE UP (ref 1.8–7.4)
NEUTROPHILS NFR BLD AUTO: 70.4 % — SIGNIFICANT CHANGE UP (ref 43–77)
NEUTROPHILS NFR BLD AUTO: 81.2 % — HIGH (ref 43–77)
NEUTROPHILS NFR BLD AUTO: 81.6 % — HIGH (ref 43–77)
NITRITE UR-MCNC: NEGATIVE — SIGNIFICANT CHANGE UP
PCO2 BLDV: 39 MMHG — LOW (ref 42–55)
PH BLDV: 7.25 — LOW (ref 7.32–7.43)
PH UR: 7 — SIGNIFICANT CHANGE UP (ref 5–8)
PHOSPHATE SERPL-MCNC: 2 MG/DL — LOW (ref 2.4–4.7)
PHOSPHATE SERPL-MCNC: 2.7 MG/DL — SIGNIFICANT CHANGE UP (ref 2.4–4.7)
PHOSPHATE SERPL-MCNC: 4.6 MG/DL — SIGNIFICANT CHANGE UP (ref 2.4–4.7)
PHOSPHATE SERPL-MCNC: 5.3 MG/DL — HIGH (ref 2.4–4.7)
PHOSPHATE SERPL-MCNC: 5.5 MG/DL — HIGH (ref 2.4–4.7)
PHOSPHATE SERPL-MCNC: 6.3 MG/DL — HIGH (ref 2.4–4.7)
PHOSPHATE SERPL-MCNC: 6.7 MG/DL — HIGH (ref 2.4–4.7)
PLATELET # BLD AUTO: 206 K/UL — SIGNIFICANT CHANGE UP (ref 150–400)
PLATELET # BLD AUTO: 216 K/UL — SIGNIFICANT CHANGE UP (ref 150–400)
PLATELET # BLD AUTO: 218 K/UL — SIGNIFICANT CHANGE UP (ref 150–400)
PLATELET # BLD AUTO: 224 K/UL — SIGNIFICANT CHANGE UP (ref 150–400)
PLATELET # BLD AUTO: 224 K/UL — SIGNIFICANT CHANGE UP (ref 150–400)
PLATELET # BLD AUTO: 228 K/UL — SIGNIFICANT CHANGE UP (ref 150–400)
PLATELET # BLD AUTO: 239 K/UL — SIGNIFICANT CHANGE UP (ref 150–400)
PO2 BLDV: 66 MMHG — HIGH (ref 25–45)
POTASSIUM BLDV-SCNC: 7.7 MMOL/L — CRITICAL HIGH (ref 3.5–5.1)
POTASSIUM SERPL-MCNC: 4.1 MMOL/L — SIGNIFICANT CHANGE UP (ref 3.5–5.3)
POTASSIUM SERPL-MCNC: 4.1 MMOL/L — SIGNIFICANT CHANGE UP (ref 3.5–5.3)
POTASSIUM SERPL-MCNC: 4.3 MMOL/L — SIGNIFICANT CHANGE UP (ref 3.5–5.3)
POTASSIUM SERPL-MCNC: 4.3 MMOL/L — SIGNIFICANT CHANGE UP (ref 3.5–5.3)
POTASSIUM SERPL-MCNC: 4.6 MMOL/L — SIGNIFICANT CHANGE UP (ref 3.5–5.3)
POTASSIUM SERPL-MCNC: 5.1 MMOL/L — SIGNIFICANT CHANGE UP (ref 3.5–5.3)
POTASSIUM SERPL-MCNC: 5.7 MMOL/L — HIGH (ref 3.5–5.3)
POTASSIUM SERPL-MCNC: 5.7 MMOL/L — HIGH (ref 3.5–5.3)
POTASSIUM SERPL-MCNC: 6.3 MMOL/L — CRITICAL HIGH (ref 3.5–5.3)
POTASSIUM SERPL-MCNC: 6.5 MMOL/L — CRITICAL HIGH (ref 3.5–5.3)
POTASSIUM SERPL-MCNC: 6.8 MMOL/L — CRITICAL HIGH (ref 3.5–5.3)
POTASSIUM SERPL-MCNC: 6.9 MMOL/L — CRITICAL HIGH (ref 3.5–5.3)
POTASSIUM SERPL-MCNC: 6.9 MMOL/L — CRITICAL HIGH (ref 3.5–5.3)
POTASSIUM SERPL-MCNC: 7.5 MMOL/L — CRITICAL HIGH (ref 3.5–5.3)
POTASSIUM SERPL-MCNC: 8.5 MMOL/L — CRITICAL HIGH (ref 3.5–5.3)
POTASSIUM SERPL-SCNC: 4.1 MMOL/L — SIGNIFICANT CHANGE UP (ref 3.5–5.3)
POTASSIUM SERPL-SCNC: 4.1 MMOL/L — SIGNIFICANT CHANGE UP (ref 3.5–5.3)
POTASSIUM SERPL-SCNC: 4.3 MMOL/L — SIGNIFICANT CHANGE UP (ref 3.5–5.3)
POTASSIUM SERPL-SCNC: 4.3 MMOL/L — SIGNIFICANT CHANGE UP (ref 3.5–5.3)
POTASSIUM SERPL-SCNC: 4.6 MMOL/L — SIGNIFICANT CHANGE UP (ref 3.5–5.3)
POTASSIUM SERPL-SCNC: 5.1 MMOL/L — SIGNIFICANT CHANGE UP (ref 3.5–5.3)
POTASSIUM SERPL-SCNC: 5.7 MMOL/L — HIGH (ref 3.5–5.3)
POTASSIUM SERPL-SCNC: 5.7 MMOL/L — HIGH (ref 3.5–5.3)
POTASSIUM SERPL-SCNC: 6.3 MMOL/L — CRITICAL HIGH (ref 3.5–5.3)
POTASSIUM SERPL-SCNC: 6.5 MMOL/L — CRITICAL HIGH (ref 3.5–5.3)
POTASSIUM SERPL-SCNC: 6.8 MMOL/L — CRITICAL HIGH (ref 3.5–5.3)
POTASSIUM SERPL-SCNC: 6.9 MMOL/L — CRITICAL HIGH (ref 3.5–5.3)
POTASSIUM SERPL-SCNC: 6.9 MMOL/L — CRITICAL HIGH (ref 3.5–5.3)
POTASSIUM SERPL-SCNC: 7.5 MMOL/L — CRITICAL HIGH (ref 3.5–5.3)
POTASSIUM SERPL-SCNC: 8.5 MMOL/L — CRITICAL HIGH (ref 3.5–5.3)
PREALB SERPL-MCNC: 13 MG/DL — LOW (ref 18–38)
PROT SERPL-MCNC: 6.4 G/DL — LOW (ref 6.6–8.7)
PROT SERPL-MCNC: 6.5 G/DL — LOW (ref 6.6–8.7)
PROT SERPL-MCNC: 6.6 G/DL — SIGNIFICANT CHANGE UP (ref 6.6–8.7)
PROT UR-MCNC: 100
PTH-INTACT FLD-MCNC: 94 PG/ML — HIGH (ref 15–65)
RBC # BLD: 4.06 M/UL — LOW (ref 4.2–5.8)
RBC # BLD: 4.16 M/UL — LOW (ref 4.2–5.8)
RBC # BLD: 4.17 M/UL — LOW (ref 4.2–5.8)
RBC # BLD: 4.18 M/UL — LOW (ref 4.2–5.8)
RBC # BLD: 4.35 M/UL — SIGNIFICANT CHANGE UP (ref 4.2–5.8)
RBC # BLD: 4.43 M/UL — SIGNIFICANT CHANGE UP (ref 4.2–5.8)
RBC # BLD: 4.6 M/UL — SIGNIFICANT CHANGE UP (ref 4.2–5.8)
RBC # FLD: 14.4 % — SIGNIFICANT CHANGE UP (ref 10.3–14.5)
RBC # FLD: 14.5 % — SIGNIFICANT CHANGE UP (ref 10.3–14.5)
RBC # FLD: 14.8 % — HIGH (ref 10.3–14.5)
RBC # FLD: 14.9 % — HIGH (ref 10.3–14.5)
RBC # FLD: 15 % — HIGH (ref 10.3–14.5)
RBC # FLD: 15.2 % — HIGH (ref 10.3–14.5)
RBC # FLD: 15.3 % — HIGH (ref 10.3–14.5)
RBC CASTS # UR COMP ASSIST: ABNORMAL /HPF (ref 0–4)
RSV RNA NPH QL NAA+NON-PROBE: SIGNIFICANT CHANGE UP
S AUREUS DNA NOSE QL NAA+PROBE: SIGNIFICANT CHANGE UP
SAO2 % BLDV: 91.4 % — SIGNIFICANT CHANGE UP
SARS-COV-2 RNA SPEC QL NAA+PROBE: SIGNIFICANT CHANGE UP
SODIUM SERPL-SCNC: 131 MMOL/L — LOW (ref 135–145)
SODIUM SERPL-SCNC: 131 MMOL/L — LOW (ref 135–145)
SODIUM SERPL-SCNC: 133 MMOL/L — LOW (ref 135–145)
SODIUM SERPL-SCNC: 133 MMOL/L — LOW (ref 135–145)
SODIUM SERPL-SCNC: 135 MMOL/L — SIGNIFICANT CHANGE UP (ref 135–145)
SODIUM SERPL-SCNC: 135 MMOL/L — SIGNIFICANT CHANGE UP (ref 135–145)
SODIUM SERPL-SCNC: 136 MMOL/L — SIGNIFICANT CHANGE UP (ref 135–145)
SODIUM SERPL-SCNC: 137 MMOL/L — SIGNIFICANT CHANGE UP (ref 135–145)
SODIUM SERPL-SCNC: 138 MMOL/L — SIGNIFICANT CHANGE UP (ref 135–145)
SODIUM SERPL-SCNC: 141 MMOL/L — SIGNIFICANT CHANGE UP (ref 135–145)
SODIUM SERPL-SCNC: 142 MMOL/L — SIGNIFICANT CHANGE UP (ref 135–145)
SODIUM SERPL-SCNC: 145 MMOL/L — SIGNIFICANT CHANGE UP (ref 135–145)
SODIUM SERPL-SCNC: 145 MMOL/L — SIGNIFICANT CHANGE UP (ref 135–145)
SODIUM SERPL-SCNC: 149 MMOL/L — HIGH (ref 135–145)
SP GR SPEC: 1 — LOW (ref 1.01–1.02)
SPECIMEN SOURCE: SIGNIFICANT CHANGE UP
T3 SERPL-MCNC: 51 NG/DL — LOW (ref 80–200)
T4 AB SER-ACNC: 4.5 UG/DL — SIGNIFICANT CHANGE UP (ref 4.5–12)
TSH SERPL-MCNC: 2.31 UIU/ML — SIGNIFICANT CHANGE UP (ref 0.27–4.2)
UROBILINOGEN FLD QL: NEGATIVE MG/DL — SIGNIFICANT CHANGE UP
WBC # BLD: 6.23 K/UL — SIGNIFICANT CHANGE UP (ref 3.8–10.5)
WBC # BLD: 6.97 K/UL — SIGNIFICANT CHANGE UP (ref 3.8–10.5)
WBC # BLD: 7.57 K/UL — SIGNIFICANT CHANGE UP (ref 3.8–10.5)
WBC # BLD: 7.6 K/UL — SIGNIFICANT CHANGE UP (ref 3.8–10.5)
WBC # BLD: 8.62 K/UL — SIGNIFICANT CHANGE UP (ref 3.8–10.5)
WBC # BLD: 8.66 K/UL — SIGNIFICANT CHANGE UP (ref 3.8–10.5)
WBC # BLD: 9.37 K/UL — SIGNIFICANT CHANGE UP (ref 3.8–10.5)
WBC # FLD AUTO: 6.23 K/UL — SIGNIFICANT CHANGE UP (ref 3.8–10.5)
WBC # FLD AUTO: 6.97 K/UL — SIGNIFICANT CHANGE UP (ref 3.8–10.5)
WBC # FLD AUTO: 7.57 K/UL — SIGNIFICANT CHANGE UP (ref 3.8–10.5)
WBC # FLD AUTO: 7.6 K/UL — SIGNIFICANT CHANGE UP (ref 3.8–10.5)
WBC # FLD AUTO: 8.62 K/UL — SIGNIFICANT CHANGE UP (ref 3.8–10.5)
WBC # FLD AUTO: 8.66 K/UL — SIGNIFICANT CHANGE UP (ref 3.8–10.5)
WBC # FLD AUTO: 9.37 K/UL — SIGNIFICANT CHANGE UP (ref 3.8–10.5)
WBC UR QL: ABNORMAL /HPF (ref 0–5)

## 2023-01-01 PROCEDURE — 99358 PROLONG SERVICE W/O CONTACT: CPT

## 2023-01-01 PROCEDURE — 85018 HEMOGLOBIN: CPT

## 2023-01-01 PROCEDURE — 99222 1ST HOSP IP/OBS MODERATE 55: CPT

## 2023-01-01 PROCEDURE — 99497 ADVNCD CARE PLAN 30 MIN: CPT | Mod: 25

## 2023-01-01 PROCEDURE — 71045 X-RAY EXAM CHEST 1 VIEW: CPT | Mod: 26

## 2023-01-01 PROCEDURE — 82947 ASSAY GLUCOSE BLOOD QUANT: CPT

## 2023-01-01 PROCEDURE — 83970 ASSAY OF PARATHORMONE: CPT

## 2023-01-01 PROCEDURE — 87637 SARSCOV2&INF A&B&RSV AMP PRB: CPT

## 2023-01-01 PROCEDURE — 87641 MR-STAPH DNA AMP PROBE: CPT

## 2023-01-01 PROCEDURE — 84443 ASSAY THYROID STIM HORMONE: CPT

## 2023-01-01 PROCEDURE — 82310 ASSAY OF CALCIUM: CPT

## 2023-01-01 PROCEDURE — 87340 HEPATITIS B SURFACE AG IA: CPT

## 2023-01-01 PROCEDURE — 99232 SBSQ HOSP IP/OBS MODERATE 35: CPT

## 2023-01-01 PROCEDURE — 96375 TX/PRO/DX INJ NEW DRUG ADDON: CPT

## 2023-01-01 PROCEDURE — 99233 SBSQ HOSP IP/OBS HIGH 50: CPT

## 2023-01-01 PROCEDURE — 99239 HOSP IP/OBS DSCHRG MGMT >30: CPT

## 2023-01-01 PROCEDURE — 70450 CT HEAD/BRAIN W/O DYE: CPT | Mod: MA

## 2023-01-01 PROCEDURE — 82962 GLUCOSE BLOOD TEST: CPT

## 2023-01-01 PROCEDURE — 99231 SBSQ HOSP IP/OBS SF/LOW 25: CPT

## 2023-01-01 PROCEDURE — 87640 STAPH A DNA AMP PROBE: CPT

## 2023-01-01 PROCEDURE — 87040 BLOOD CULTURE FOR BACTERIA: CPT

## 2023-01-01 PROCEDURE — 99223 1ST HOSP IP/OBS HIGH 75: CPT

## 2023-01-01 PROCEDURE — 87086 URINE CULTURE/COLONY COUNT: CPT

## 2023-01-01 PROCEDURE — 85027 COMPLETE CBC AUTOMATED: CPT

## 2023-01-01 PROCEDURE — 84480 ASSAY TRIIODOTHYRONINE (T3): CPT

## 2023-01-01 PROCEDURE — 93005 ELECTROCARDIOGRAM TRACING: CPT

## 2023-01-01 PROCEDURE — U0005: CPT

## 2023-01-01 PROCEDURE — 84295 ASSAY OF SERUM SODIUM: CPT

## 2023-01-01 PROCEDURE — 80053 COMPREHEN METABOLIC PANEL: CPT

## 2023-01-01 PROCEDURE — 36600 WITHDRAWAL OF ARTERIAL BLOOD: CPT

## 2023-01-01 PROCEDURE — 93010 ELECTROCARDIOGRAM REPORT: CPT

## 2023-01-01 PROCEDURE — 80048 BASIC METABOLIC PNL TOTAL CA: CPT

## 2023-01-01 PROCEDURE — 74176 CT ABD & PELVIS W/O CONTRAST: CPT | Mod: 26,MA

## 2023-01-01 PROCEDURE — 83605 ASSAY OF LACTIC ACID: CPT

## 2023-01-01 PROCEDURE — 74176 CT ABD & PELVIS W/O CONTRAST: CPT | Mod: MA

## 2023-01-01 PROCEDURE — 82435 ASSAY OF BLOOD CHLORIDE: CPT

## 2023-01-01 PROCEDURE — 84436 ASSAY OF TOTAL THYROXINE: CPT

## 2023-01-01 PROCEDURE — 81001 URINALYSIS AUTO W/SCOPE: CPT

## 2023-01-01 PROCEDURE — 52001 CYSTO W/IRRG&EVAC MLT CLOTS: CPT

## 2023-01-01 PROCEDURE — 97163 PT EVAL HIGH COMPLEX 45 MIN: CPT

## 2023-01-01 PROCEDURE — 82306 VITAMIN D 25 HYDROXY: CPT

## 2023-01-01 PROCEDURE — 86704 HEP B CORE ANTIBODY TOTAL: CPT

## 2023-01-01 PROCEDURE — 36415 COLL VENOUS BLD VENIPUNCTURE: CPT

## 2023-01-01 PROCEDURE — U0003: CPT

## 2023-01-01 PROCEDURE — 94640 AIRWAY INHALATION TREATMENT: CPT

## 2023-01-01 PROCEDURE — 82803 BLOOD GASES ANY COMBINATION: CPT

## 2023-01-01 PROCEDURE — 84100 ASSAY OF PHOSPHORUS: CPT

## 2023-01-01 PROCEDURE — 99285 EMERGENCY DEPT VISIT HI MDM: CPT

## 2023-01-01 PROCEDURE — 82330 ASSAY OF CALCIUM: CPT

## 2023-01-01 PROCEDURE — 84134 ASSAY OF PREALBUMIN: CPT

## 2023-01-01 PROCEDURE — 96365 THER/PROPH/DIAG IV INF INIT: CPT

## 2023-01-01 PROCEDURE — 86803 HEPATITIS C AB TEST: CPT

## 2023-01-01 PROCEDURE — 84132 ASSAY OF SERUM POTASSIUM: CPT

## 2023-01-01 PROCEDURE — 70450 CT HEAD/BRAIN W/O DYE: CPT | Mod: 26,MA

## 2023-01-01 PROCEDURE — 85025 COMPLETE CBC W/AUTO DIFF WBC: CPT

## 2023-01-01 PROCEDURE — 71045 X-RAY EXAM CHEST 1 VIEW: CPT

## 2023-01-01 PROCEDURE — 86706 HEP B SURFACE ANTIBODY: CPT

## 2023-01-01 PROCEDURE — 85014 HEMATOCRIT: CPT

## 2023-01-01 PROCEDURE — 83735 ASSAY OF MAGNESIUM: CPT

## 2023-01-01 RX ORDER — QUETIAPINE FUMARATE 200 MG/1
1 TABLET, FILM COATED ORAL
Qty: 30 | Refills: 0
Start: 2023-01-01 | End: 2023-01-01

## 2023-01-01 RX ORDER — QUETIAPINE FUMARATE 200 MG/1
25 TABLET, FILM COATED ORAL AT BEDTIME
Refills: 0 | Status: DISCONTINUED | OUTPATIENT
Start: 2023-01-01 | End: 2023-01-01

## 2023-01-01 RX ORDER — ATORVASTATIN CALCIUM 80 MG/1
1 TABLET, FILM COATED ORAL
Qty: 0 | Refills: 0 | DISCHARGE

## 2023-01-01 RX ORDER — DEXTROSE 50 % IN WATER 50 %
50 SYRINGE (ML) INTRAVENOUS ONCE
Refills: 0 | Status: COMPLETED | OUTPATIENT
Start: 2023-01-01 | End: 2023-01-01

## 2023-01-01 RX ORDER — TAMSULOSIN HYDROCHLORIDE 0.4 MG/1
0.4 CAPSULE ORAL AT BEDTIME
Refills: 0 | Status: DISCONTINUED | OUTPATIENT
Start: 2023-01-01 | End: 2023-01-01

## 2023-01-01 RX ORDER — DEXTROSE 50 % IN WATER 50 %
15 SYRINGE (ML) INTRAVENOUS ONCE
Refills: 0 | Status: DISCONTINUED | OUTPATIENT
Start: 2023-01-01 | End: 2023-01-01

## 2023-01-01 RX ORDER — INSULIN HUMAN 100 [IU]/ML
5 INJECTION, SOLUTION SUBCUTANEOUS ONCE
Refills: 0 | Status: COMPLETED | OUTPATIENT
Start: 2023-01-01 | End: 2023-01-01

## 2023-01-01 RX ORDER — BRIMONIDINE TARTRATE, TIMOLOL MALEATE 2; 5 MG/ML; MG/ML
1 SOLUTION/ DROPS OPHTHALMIC
Qty: 0 | Refills: 0 | DISCHARGE

## 2023-01-01 RX ORDER — AMLODIPINE BESYLATE 2.5 MG/1
1 TABLET ORAL
Qty: 0 | Refills: 0 | DISCHARGE
Start: 2023-01-01

## 2023-01-01 RX ORDER — ACETAMINOPHEN 500 MG
650 TABLET ORAL EVERY 6 HOURS
Refills: 0 | Status: DISCONTINUED | OUTPATIENT
Start: 2023-01-01 | End: 2023-01-01

## 2023-01-01 RX ORDER — DEXTROSE 50 % IN WATER 50 %
25 SYRINGE (ML) INTRAVENOUS ONCE
Refills: 0 | Status: DISCONTINUED | OUTPATIENT
Start: 2023-01-01 | End: 2023-01-01

## 2023-01-01 RX ORDER — SODIUM ZIRCONIUM CYCLOSILICATE 10 G/10G
10 POWDER, FOR SUSPENSION ORAL DAILY
Refills: 0 | Status: DISCONTINUED | OUTPATIENT
Start: 2023-01-01 | End: 2023-01-01

## 2023-01-01 RX ORDER — TRAVOPROST 0.04 MG/ML
1 SOLUTION/ DROPS OPHTHALMIC
Qty: 0 | Refills: 0 | DISCHARGE

## 2023-01-01 RX ORDER — SODIUM CHLORIDE 9 MG/ML
1000 INJECTION, SOLUTION INTRAVENOUS
Refills: 0 | Status: DISCONTINUED | OUTPATIENT
Start: 2023-01-01 | End: 2023-01-01

## 2023-01-01 RX ORDER — CALCIUM CHLORIDE
1000 POWDER (GRAM) MISCELLANEOUS ONCE
Refills: 0 | Status: DISCONTINUED | OUTPATIENT
Start: 2023-01-01 | End: 2023-01-01

## 2023-01-01 RX ORDER — PIPERACILLIN AND TAZOBACTAM 4; .5 G/20ML; G/20ML
3.38 INJECTION, POWDER, LYOPHILIZED, FOR SOLUTION INTRAVENOUS ONCE
Refills: 0 | Status: COMPLETED | OUTPATIENT
Start: 2023-01-01 | End: 2023-01-01

## 2023-01-01 RX ORDER — SODIUM BICARBONATE 1 MEQ/ML
0.31 SYRINGE (ML) INTRAVENOUS
Qty: 150 | Refills: 0 | Status: DISCONTINUED | OUTPATIENT
Start: 2023-01-01 | End: 2023-01-01

## 2023-01-01 RX ORDER — SODIUM CHLORIDE 9 MG/ML
1000 INJECTION, SOLUTION INTRAVENOUS ONCE
Refills: 0 | Status: COMPLETED | OUTPATIENT
Start: 2023-01-01 | End: 2023-01-01

## 2023-01-01 RX ORDER — QUETIAPINE FUMARATE 200 MG/1
0.5 TABLET, FILM COATED ORAL
Qty: 15 | Refills: 0
Start: 2023-01-01 | End: 2023-01-01

## 2023-01-01 RX ORDER — DORZOLAMIDE HYDROCHLORIDE 20 MG/ML
1 SOLUTION/ DROPS OPHTHALMIC
Qty: 0 | Refills: 0 | DISCHARGE

## 2023-01-01 RX ORDER — LOSARTAN POTASSIUM 100 MG/1
1 TABLET, FILM COATED ORAL
Qty: 0 | Refills: 0 | DISCHARGE

## 2023-01-01 RX ORDER — AMLODIPINE BESYLATE 2.5 MG/1
2.5 TABLET ORAL DAILY
Refills: 0 | Status: DISCONTINUED | OUTPATIENT
Start: 2023-01-01 | End: 2023-01-01

## 2023-01-01 RX ORDER — CALCIUM GLUCONATE 100 MG/ML
2 VIAL (ML) INTRAVENOUS ONCE
Refills: 0 | Status: COMPLETED | OUTPATIENT
Start: 2023-01-01 | End: 2023-01-01

## 2023-01-01 RX ORDER — PIPERACILLIN AND TAZOBACTAM 4; .5 G/20ML; G/20ML
3.38 INJECTION, POWDER, LYOPHILIZED, FOR SOLUTION INTRAVENOUS EVERY 12 HOURS
Refills: 0 | Status: DISCONTINUED | OUTPATIENT
Start: 2023-01-01 | End: 2023-01-01

## 2023-01-01 RX ORDER — OMEGA-3 ACID ETHYL ESTERS 1 G
1 CAPSULE ORAL
Qty: 0 | Refills: 0 | DISCHARGE

## 2023-01-01 RX ORDER — CHLORHEXIDINE GLUCONATE 213 G/1000ML
1 SOLUTION TOPICAL
Refills: 0 | Status: DISCONTINUED | OUTPATIENT
Start: 2023-01-01 | End: 2023-01-01

## 2023-01-01 RX ORDER — DEXTROSE 50 % IN WATER 50 %
25 SYRINGE (ML) INTRAVENOUS ONCE
Refills: 0 | Status: COMPLETED | OUTPATIENT
Start: 2023-01-01 | End: 2023-01-01

## 2023-01-01 RX ORDER — QUETIAPINE FUMARATE 200 MG/1
25 TABLET, FILM COATED ORAL
Refills: 0 | Status: DISCONTINUED | OUTPATIENT
Start: 2023-01-01 | End: 2023-01-01

## 2023-01-01 RX ORDER — MIDAZOLAM HYDROCHLORIDE 1 MG/ML
2 INJECTION, SOLUTION INTRAMUSCULAR; INTRAVENOUS ONCE
Refills: 0 | Status: DISCONTINUED | OUTPATIENT
Start: 2023-01-01 | End: 2023-01-01

## 2023-01-01 RX ORDER — DEXTROSE 50 % IN WATER 50 %
12.5 SYRINGE (ML) INTRAVENOUS ONCE
Refills: 0 | Status: DISCONTINUED | OUTPATIENT
Start: 2023-01-01 | End: 2023-01-01

## 2023-01-01 RX ORDER — SODIUM ZIRCONIUM CYCLOSILICATE 10 G/10G
10 POWDER, FOR SUSPENSION ORAL EVERY 8 HOURS
Refills: 0 | Status: COMPLETED | OUTPATIENT
Start: 2023-01-01 | End: 2023-01-01

## 2023-01-01 RX ORDER — INSULIN HUMAN 100 [IU]/ML
10 INJECTION, SOLUTION SUBCUTANEOUS ONCE
Refills: 0 | Status: COMPLETED | OUTPATIENT
Start: 2023-01-01 | End: 2023-01-01

## 2023-01-01 RX ORDER — HEPARIN SODIUM 5000 [USP'U]/ML
5000 INJECTION INTRAVENOUS; SUBCUTANEOUS EVERY 12 HOURS
Refills: 0 | Status: DISCONTINUED | OUTPATIENT
Start: 2023-01-01 | End: 2023-01-01

## 2023-01-01 RX ORDER — CALCIUM GLUCONATE 100 MG/ML
1 VIAL (ML) INTRAVENOUS ONCE
Refills: 0 | Status: COMPLETED | OUTPATIENT
Start: 2023-01-01 | End: 2023-01-01

## 2023-01-01 RX ORDER — ACETAMINOPHEN 500 MG
1000 TABLET ORAL ONCE
Refills: 0 | Status: DISCONTINUED | OUTPATIENT
Start: 2023-01-01 | End: 2023-01-01

## 2023-01-01 RX ORDER — AMLODIPINE BESYLATE 2.5 MG/1
5 TABLET ORAL DAILY
Refills: 0 | Status: DISCONTINUED | OUTPATIENT
Start: 2023-01-01 | End: 2023-01-01

## 2023-01-01 RX ORDER — VANCOMYCIN HCL 1 G
1000 VIAL (EA) INTRAVENOUS ONCE
Refills: 0 | Status: COMPLETED | OUTPATIENT
Start: 2023-01-01 | End: 2023-01-01

## 2023-01-01 RX ORDER — TAMSULOSIN HYDROCHLORIDE 0.4 MG/1
1 CAPSULE ORAL
Qty: 0 | Refills: 0 | DISCHARGE

## 2023-01-01 RX ORDER — DORZOLAMIDE HYDROCHLORIDE TIMOLOL MALEATE 20; 5 MG/ML; MG/ML
1 SOLUTION/ DROPS OPHTHALMIC
Qty: 0 | Refills: 0 | DISCHARGE

## 2023-01-01 RX ORDER — GLUCAGON INJECTION, SOLUTION 0.5 MG/.1ML
1 INJECTION, SOLUTION SUBCUTANEOUS ONCE
Refills: 0 | Status: DISCONTINUED | OUTPATIENT
Start: 2023-01-01 | End: 2023-01-01

## 2023-01-01 RX ORDER — QUETIAPINE FUMARATE 200 MG/1
12.5 TABLET, FILM COATED ORAL
Refills: 0 | Status: DISCONTINUED | OUTPATIENT
Start: 2023-01-01 | End: 2023-01-01

## 2023-01-01 RX ORDER — ACETAMINOPHEN 500 MG
1000 TABLET ORAL ONCE
Refills: 0 | Status: COMPLETED | OUTPATIENT
Start: 2023-01-01 | End: 2023-01-01

## 2023-01-01 RX ORDER — ASPIRIN/CALCIUM CARB/MAGNESIUM 324 MG
1 TABLET ORAL
Qty: 0 | Refills: 0 | DISCHARGE

## 2023-01-01 RX ORDER — ENOXAPARIN SODIUM 100 MG/ML
40 INJECTION SUBCUTANEOUS EVERY 24 HOURS
Refills: 0 | Status: DISCONTINUED | OUTPATIENT
Start: 2023-01-01 | End: 2023-01-01

## 2023-01-01 RX ORDER — QUETIAPINE FUMARATE 200 MG/1
12.5 TABLET, FILM COATED ORAL DAILY
Refills: 0 | Status: DISCONTINUED | OUTPATIENT
Start: 2023-01-01 | End: 2023-01-01

## 2023-01-01 RX ORDER — AMLODIPINE BESYLATE 2.5 MG/1
2.5 TABLET ORAL ONCE
Refills: 0 | Status: COMPLETED | OUTPATIENT
Start: 2023-01-01 | End: 2023-01-01

## 2023-01-01 RX ORDER — ALBUTEROL 90 UG/1
10 AEROSOL, METERED ORAL ONCE
Refills: 0 | Status: COMPLETED | OUTPATIENT
Start: 2023-01-01 | End: 2023-01-01

## 2023-01-01 RX ADMIN — QUETIAPINE FUMARATE 12.5 MILLIGRAM(S): 200 TABLET, FILM COATED ORAL at 04:20

## 2023-01-01 RX ADMIN — CHLORHEXIDINE GLUCONATE 1 APPLICATION(S): 213 SOLUTION TOPICAL at 05:39

## 2023-01-01 RX ADMIN — HEPARIN SODIUM 5000 UNIT(S): 5000 INJECTION INTRAVENOUS; SUBCUTANEOUS at 05:15

## 2023-01-01 RX ADMIN — Medication 250 MILLIGRAM(S): at 22:58

## 2023-01-01 RX ADMIN — PIPERACILLIN AND TAZOBACTAM 25 GRAM(S): 4; .5 INJECTION, POWDER, LYOPHILIZED, FOR SOLUTION INTRAVENOUS at 05:12

## 2023-01-01 RX ADMIN — CHLORHEXIDINE GLUCONATE 1 APPLICATION(S): 213 SOLUTION TOPICAL at 04:22

## 2023-01-01 RX ADMIN — INSULIN HUMAN 10 UNIT(S): 100 INJECTION, SOLUTION SUBCUTANEOUS at 17:05

## 2023-01-01 RX ADMIN — MIDAZOLAM HYDROCHLORIDE 2 MILLIGRAM(S): 1 INJECTION, SOLUTION INTRAMUSCULAR; INTRAVENOUS at 19:15

## 2023-01-01 RX ADMIN — PIPERACILLIN AND TAZOBACTAM 200 GRAM(S): 4; .5 INJECTION, POWDER, LYOPHILIZED, FOR SOLUTION INTRAVENOUS at 22:57

## 2023-01-01 RX ADMIN — Medication 100 GRAM(S): at 21:44

## 2023-01-01 RX ADMIN — CHLORHEXIDINE GLUCONATE 1 APPLICATION(S): 213 SOLUTION TOPICAL at 05:17

## 2023-01-01 RX ADMIN — AMLODIPINE BESYLATE 2.5 MILLIGRAM(S): 2.5 TABLET ORAL at 06:23

## 2023-01-01 RX ADMIN — Medication 200 GRAM(S): at 17:04

## 2023-01-01 RX ADMIN — HEPARIN SODIUM 5000 UNIT(S): 5000 INJECTION INTRAVENOUS; SUBCUTANEOUS at 17:07

## 2023-01-01 RX ADMIN — SODIUM CHLORIDE 1000 MILLILITER(S): 9 INJECTION, SOLUTION INTRAVENOUS at 15:16

## 2023-01-01 RX ADMIN — INSULIN HUMAN 10 UNIT(S): 100 INJECTION, SOLUTION SUBCUTANEOUS at 02:41

## 2023-01-01 RX ADMIN — AMLODIPINE BESYLATE 5 MILLIGRAM(S): 2.5 TABLET ORAL at 05:30

## 2023-01-01 RX ADMIN — HEPARIN SODIUM 5000 UNIT(S): 5000 INJECTION INTRAVENOUS; SUBCUTANEOUS at 17:00

## 2023-01-01 RX ADMIN — AMLODIPINE BESYLATE 2.5 MILLIGRAM(S): 2.5 TABLET ORAL at 17:28

## 2023-01-01 RX ADMIN — SODIUM CHLORIDE 1000 MILLILITER(S): 9 INJECTION, SOLUTION INTRAVENOUS at 14:16

## 2023-01-01 RX ADMIN — SODIUM CHLORIDE 100 MILLILITER(S): 9 INJECTION, SOLUTION INTRAVENOUS at 18:47

## 2023-01-01 RX ADMIN — QUETIAPINE FUMARATE 12.5 MILLIGRAM(S): 200 TABLET, FILM COATED ORAL at 11:05

## 2023-01-01 RX ADMIN — HEPARIN SODIUM 5000 UNIT(S): 5000 INJECTION INTRAVENOUS; SUBCUTANEOUS at 17:31

## 2023-01-01 RX ADMIN — PIPERACILLIN AND TAZOBACTAM 25 GRAM(S): 4; .5 INJECTION, POWDER, LYOPHILIZED, FOR SOLUTION INTRAVENOUS at 17:00

## 2023-01-01 RX ADMIN — Medication 100 MEQ/KG/HR: at 18:20

## 2023-01-01 RX ADMIN — Medication 2 GRAM(S): at 17:35

## 2023-01-01 RX ADMIN — AMLODIPINE BESYLATE 5 MILLIGRAM(S): 2.5 TABLET ORAL at 05:15

## 2023-01-01 RX ADMIN — Medication 150 MEQ/KG/HR: at 03:57

## 2023-01-01 RX ADMIN — SODIUM ZIRCONIUM CYCLOSILICATE 10 GRAM(S): 10 POWDER, FOR SUSPENSION ORAL at 16:00

## 2023-01-01 RX ADMIN — SODIUM CHLORIDE 100 MILLILITER(S): 9 INJECTION, SOLUTION INTRAVENOUS at 09:25

## 2023-01-01 RX ADMIN — SODIUM ZIRCONIUM CYCLOSILICATE 10 GRAM(S): 10 POWDER, FOR SUSPENSION ORAL at 17:01

## 2023-01-01 RX ADMIN — Medication 100 GRAM(S): at 02:41

## 2023-01-01 RX ADMIN — CHLORHEXIDINE GLUCONATE 1 APPLICATION(S): 213 SOLUTION TOPICAL at 05:13

## 2023-01-01 RX ADMIN — CHLORHEXIDINE GLUCONATE 1 APPLICATION(S): 213 SOLUTION TOPICAL at 05:30

## 2023-01-01 RX ADMIN — INSULIN HUMAN 10 UNIT(S): 100 INJECTION, SOLUTION SUBCUTANEOUS at 12:57

## 2023-01-01 RX ADMIN — AMLODIPINE BESYLATE 5 MILLIGRAM(S): 2.5 TABLET ORAL at 05:39

## 2023-01-01 RX ADMIN — SODIUM ZIRCONIUM CYCLOSILICATE 10 GRAM(S): 10 POWDER, FOR SUSPENSION ORAL at 11:46

## 2023-01-01 RX ADMIN — HEPARIN SODIUM 5000 UNIT(S): 5000 INJECTION INTRAVENOUS; SUBCUTANEOUS at 17:28

## 2023-01-01 RX ADMIN — QUETIAPINE FUMARATE 25 MILLIGRAM(S): 200 TABLET, FILM COATED ORAL at 05:48

## 2023-01-01 RX ADMIN — HEPARIN SODIUM 5000 UNIT(S): 5000 INJECTION INTRAVENOUS; SUBCUTANEOUS at 16:47

## 2023-01-01 RX ADMIN — HEPARIN SODIUM 5000 UNIT(S): 5000 INJECTION INTRAVENOUS; SUBCUTANEOUS at 06:23

## 2023-01-01 RX ADMIN — CHLORHEXIDINE GLUCONATE 1 APPLICATION(S): 213 SOLUTION TOPICAL at 06:22

## 2023-01-01 RX ADMIN — SODIUM CHLORIDE 100 MILLILITER(S): 9 INJECTION, SOLUTION INTRAVENOUS at 10:21

## 2023-01-01 RX ADMIN — HEPARIN SODIUM 5000 UNIT(S): 5000 INJECTION INTRAVENOUS; SUBCUTANEOUS at 06:26

## 2023-01-01 RX ADMIN — Medication 200 GRAM(S): at 12:57

## 2023-01-01 RX ADMIN — Medication 50 MILLILITER(S): at 12:58

## 2023-01-01 RX ADMIN — Medication 400 MILLIGRAM(S): at 03:57

## 2023-01-01 RX ADMIN — HEPARIN SODIUM 5000 UNIT(S): 5000 INJECTION INTRAVENOUS; SUBCUTANEOUS at 05:09

## 2023-01-01 RX ADMIN — INSULIN HUMAN 5 UNIT(S): 100 INJECTION, SOLUTION SUBCUTANEOUS at 21:43

## 2023-01-01 RX ADMIN — HEPARIN SODIUM 5000 UNIT(S): 5000 INJECTION INTRAVENOUS; SUBCUTANEOUS at 05:48

## 2023-01-01 RX ADMIN — QUETIAPINE FUMARATE 25 MILLIGRAM(S): 200 TABLET, FILM COATED ORAL at 17:28

## 2023-01-01 RX ADMIN — SODIUM CHLORIDE 2000 MILLILITER(S): 9 INJECTION, SOLUTION INTRAVENOUS at 02:42

## 2023-01-01 RX ADMIN — SODIUM CHLORIDE 100 MILLILITER(S): 9 INJECTION, SOLUTION INTRAVENOUS at 23:21

## 2023-01-01 RX ADMIN — QUETIAPINE FUMARATE 25 MILLIGRAM(S): 200 TABLET, FILM COATED ORAL at 21:34

## 2023-01-01 RX ADMIN — PIPERACILLIN AND TAZOBACTAM 25 GRAM(S): 4; .5 INJECTION, POWDER, LYOPHILIZED, FOR SOLUTION INTRAVENOUS at 06:25

## 2023-01-01 RX ADMIN — PIPERACILLIN AND TAZOBACTAM 25 GRAM(S): 4; .5 INJECTION, POWDER, LYOPHILIZED, FOR SOLUTION INTRAVENOUS at 04:20

## 2023-01-01 RX ADMIN — QUETIAPINE FUMARATE 25 MILLIGRAM(S): 200 TABLET, FILM COATED ORAL at 06:23

## 2023-01-01 RX ADMIN — Medication 50 MILLILITER(S): at 17:04

## 2023-01-01 RX ADMIN — SODIUM CHLORIDE 50 MILLILITER(S): 9 INJECTION, SOLUTION INTRAVENOUS at 12:55

## 2023-01-01 RX ADMIN — HEPARIN SODIUM 5000 UNIT(S): 5000 INJECTION INTRAVENOUS; SUBCUTANEOUS at 17:37

## 2023-01-01 RX ADMIN — PIPERACILLIN AND TAZOBACTAM 25 GRAM(S): 4; .5 INJECTION, POWDER, LYOPHILIZED, FOR SOLUTION INTRAVENOUS at 05:53

## 2023-01-01 RX ADMIN — Medication 150 MEQ/KG/HR: at 20:27

## 2023-01-01 RX ADMIN — QUETIAPINE FUMARATE 25 MILLIGRAM(S): 200 TABLET, FILM COATED ORAL at 17:09

## 2023-01-01 RX ADMIN — CHLORHEXIDINE GLUCONATE 1 APPLICATION(S): 213 SOLUTION TOPICAL at 06:14

## 2023-01-01 RX ADMIN — PIPERACILLIN AND TAZOBACTAM 25 GRAM(S): 4; .5 INJECTION, POWDER, LYOPHILIZED, FOR SOLUTION INTRAVENOUS at 17:31

## 2023-01-01 RX ADMIN — AMLODIPINE BESYLATE 2.5 MILLIGRAM(S): 2.5 TABLET ORAL at 18:12

## 2023-01-01 RX ADMIN — QUETIAPINE FUMARATE 25 MILLIGRAM(S): 200 TABLET, FILM COATED ORAL at 16:46

## 2023-01-01 RX ADMIN — SODIUM ZIRCONIUM CYCLOSILICATE 10 GRAM(S): 10 POWDER, FOR SUSPENSION ORAL at 04:06

## 2023-01-01 RX ADMIN — HEPARIN SODIUM 5000 UNIT(S): 5000 INJECTION INTRAVENOUS; SUBCUTANEOUS at 05:31

## 2023-01-01 RX ADMIN — AMLODIPINE BESYLATE 5 MILLIGRAM(S): 2.5 TABLET ORAL at 05:53

## 2023-01-01 RX ADMIN — Medication 50 MILLILITER(S): at 02:41

## 2023-01-01 RX ADMIN — SODIUM CHLORIDE 100 MILLILITER(S): 9 INJECTION, SOLUTION INTRAVENOUS at 10:37

## 2023-01-01 RX ADMIN — HEPARIN SODIUM 5000 UNIT(S): 5000 INJECTION INTRAVENOUS; SUBCUTANEOUS at 17:09

## 2023-01-01 RX ADMIN — SODIUM CHLORIDE 2000 MILLILITER(S): 9 INJECTION, SOLUTION INTRAVENOUS at 21:59

## 2023-01-01 RX ADMIN — QUETIAPINE FUMARATE 25 MILLIGRAM(S): 200 TABLET, FILM COATED ORAL at 05:31

## 2023-01-01 RX ADMIN — SODIUM CHLORIDE 125 MILLILITER(S): 9 INJECTION, SOLUTION INTRAVENOUS at 14:20

## 2023-01-01 RX ADMIN — HEPARIN SODIUM 5000 UNIT(S): 5000 INJECTION INTRAVENOUS; SUBCUTANEOUS at 04:19

## 2023-01-01 RX ADMIN — QUETIAPINE FUMARATE 12.5 MILLIGRAM(S): 200 TABLET, FILM COATED ORAL at 17:31

## 2023-01-01 RX ADMIN — Medication 25 GRAM(S): at 20:08

## 2023-01-01 RX ADMIN — HEPARIN SODIUM 5000 UNIT(S): 5000 INJECTION INTRAVENOUS; SUBCUTANEOUS at 18:21

## 2023-01-01 RX ADMIN — Medication 50 MILLILITER(S): at 21:43

## 2023-01-01 RX ADMIN — TAMSULOSIN HYDROCHLORIDE 0.4 MILLIGRAM(S): 0.4 CAPSULE ORAL at 21:34

## 2023-01-01 RX ADMIN — SODIUM CHLORIDE 1000 MILLILITER(S): 9 INJECTION, SOLUTION INTRAVENOUS at 01:00

## 2023-01-01 RX ADMIN — QUETIAPINE FUMARATE 25 MILLIGRAM(S): 200 TABLET, FILM COATED ORAL at 22:08

## 2023-01-01 RX ADMIN — SODIUM ZIRCONIUM CYCLOSILICATE 10 GRAM(S): 10 POWDER, FOR SUSPENSION ORAL at 21:02

## 2023-01-01 RX ADMIN — Medication 75 MEQ/KG/HR: at 17:18

## 2023-01-01 RX ADMIN — HEPARIN SODIUM 5000 UNIT(S): 5000 INJECTION INTRAVENOUS; SUBCUTANEOUS at 05:53

## 2023-01-01 RX ADMIN — ALBUTEROL 10 MILLIGRAM(S): 90 AEROSOL, METERED ORAL at 21:33

## 2023-01-01 RX ADMIN — CHLORHEXIDINE GLUCONATE 1 APPLICATION(S): 213 SOLUTION TOPICAL at 05:48

## 2023-01-01 RX ADMIN — CHLORHEXIDINE GLUCONATE 1 APPLICATION(S): 213 SOLUTION TOPICAL at 06:25

## 2023-01-01 RX ADMIN — HEPARIN SODIUM 5000 UNIT(S): 5000 INJECTION INTRAVENOUS; SUBCUTANEOUS at 05:39

## 2023-01-01 RX ADMIN — Medication 1000 MILLIGRAM(S): at 04:04

## 2023-01-01 RX ADMIN — PIPERACILLIN AND TAZOBACTAM 25 GRAM(S): 4; .5 INJECTION, POWDER, LYOPHILIZED, FOR SOLUTION INTRAVENOUS at 18:20

## 2023-02-09 NOTE — ED PROVIDER NOTE - OBJECTIVE STATEMENT
ALON MACHADO is a 76yo M with PMH HTN, HLD, Chronic kidney disease (CKD), Bladder CA s/p immunotherapy and resection x2 who presents w/ evidence of failure to thrive. Daughter is bedside and states he has not been eating or drinking for the last five days. States he is now more confused, no longer oriented to place or time. PT is refusing to eat or drink and has not been getting out of bed. Daughter states this is an abrupt departure from his baseline where he is normally oriented x3. PT denies any complaints at this time. Daughter denies recent fevers or specific complaints such as urinary symptoms, sob, chest pain.

## 2023-02-09 NOTE — CONSULT NOTE ADULT - ASSESSMENT
The patient is a 77 year old male with PMH of HTN, HLD, CKD 3b (baseline creatinine ~1.9mg/dL in August 2022), hx of Prostate Ca s/p chemoradiation +/- ? prostatectomy, active Bladder Ca s/p resection x 2 and immunotherapy (last treatment was in August 2022) presents with weakness, anorexia, anuria and altered mental status. Labs showed K 8.5, , Cr 31.5. Nephrology was consulted for VAHE on CKD, hyperkalemia and evaluation for hemodialysis. Unable to obtain history from patient due to AMS. History was obtained from daughter (Yris Thakur) whom was present at bedside.    -VAHE (on CKD) is secondary to obstructive uropathy   -Baseline creatinine is 1.9mg/dL (in August 2022)  -On admission, creatinine is 31.5mg/dL  -Bedside ultrasound showed distended bladder   -ED RN is unable to place cobb despite several attempts  -Stat Urology consult placed by ED resident for cobb placement - awaiting placement of cobb by Urology at this time  -If unable to relief obstruction, then hemodialysis tonight (see below)   -Of note, potassium 8.5   -s/p medical management (calcium gluconate + D50 + insulin)  -Will start isotonic bicarb gtt at 75cc/hr   -Per daughter (whom is also health care proxy), the patient had exhibited apathetic behavior since onset of Covid19 pandemic and had expressed his wish "to die" on several occasions however daughter is unclear if the patient have been 'depressed' - she would like to discuss goals of care with her mother tonight - okay with proceeding with hemodialysis tonight as per daughter (if unable to relieve obstruction)   -Of note, MOLST completed - DNR - daughter understands that the patient is at risk of arrhthymias from hyperkalemia - she expresses that should he develop cardiac arrest from arrhthymias then she would like for him to pass naturally/peacefully without heroic measures  -Will reassess the patient pending cobb placement by Urology   -Palliative consult in a.m. to assist with goals of care discussion (daughter is agreeable)    Bubba Silverman DO  Nephrology  [facility]  Office Number [phone] The patient is a 77 year old male with PMH of HTN, HLD, CKD 3b (baseline creatinine ~1.9mg/dL in August 2022), hx of Prostate Ca s/p chemoradiation +/- ? prostatectomy, active Bladder Ca s/p resection x 2 and immunotherapy (last treatment was in August 2022) presents with weakness, anorexia, anuria and altered mental status. Labs showed K 8.5, , Cr 31.5. Nephrology was consulted for VAHE on CKD, hyperkalemia and evaluation for hemodialysis. Unable to obtain history from patient due to AMS. History was obtained from daughter (Yris Thakur) whom was present at bedside.    -VAHE (on CKD) is secondary to obstructive uropathy   -Baseline creatinine is 1.9mg/dL (in August 2022)  -On admission, creatinine is 31.5mg/dL  -Bedside ultrasound showed distended bladder   -ED RN is unable to place cobb despite several attempts  -Stat Urology consult placed by ED resident for cobb placement - awaiting placement of cobb by Urology at this time  -If unable to relief obstruction, then hemodialysis tonight (see below)   -Of note, potassium 8.5   -s/p medical management (calcium gluconate + D50 + insulin)  -Will start isotonic bicarb gtt at 75cc/hr   -Per daughter (whom is also health care proxy), the patient had exhibited apathetic behavior since onset of Covid19 pandemic and had expressed his wish "to die" on several occasions however daughter is unclear if the patient have been 'depressed' - she would like to discuss goals of care with her mother tonight - okay with proceeding with hemodialysis tonight as per daughter (if unable to relieve obstruction)   -Of note, MOLST completed - DNR - daughter understands that the patient is at risk of arrhthymias from hyperkalemia - she expresses that should he develop cardiac arrest from arrhthymias then she would like for him to pass naturally/peacefully without heroic measures  -Will reassess the patient pending cobb placement by Urology   -Palliative consult in a.m. to assist with goals of care discussion (daughter is agreeable)    ADDENDUM (02/09/2023 at 6:30pm): Urology PA is unable to pass wire. Given inability to relieve obstructive uropathy and hyperkalemia, hemodialysis is warranted at this time. Discussed with ED team and MICU team. Placement of non tunneled dialysis catheter will be deferred to MICU team. Daughter was given an update over the phone.      Bubba Silverman DO  Nephrology  [facility]  Office Number [phone] The patient is a 77 year old male with PMH of HTN, HLD, CKD 3b (baseline creatinine ~1.9mg/dL in August 2022), hx of Prostate Ca s/p chemoradiation +/- ? prostatectomy, active Bladder Ca s/p resection x 2 and immunotherapy (last treatment was in August 2022) presents with weakness, anorexia, anuria and altered mental status. Labs showed K 8.5, , Cr 31.5. Nephrology was consulted for VAHE on CKD, hyperkalemia and evaluation for hemodialysis. Unable to obtain history from patient due to AMS. History was obtained from daughter (Yris Thakur) whom was present at bedside.    -VAHE (on CKD) is secondary to obstructive uropathy   -Baseline creatinine is 1.9mg/dL (in August 2022)  -On admission, creatinine is 31.5mg/dL  -Bedside ultrasound showed distended bladder   -ED RN is unable to place cobb despite several attempts  -Stat Urology consult placed by ED resident for cobb placement - awaiting placement of cobb by Urology at this time  -If unable to relief obstruction, then hemodialysis tonight (see below)   -Of note, potassium 8.5   -s/p medical management (calcium gluconate + D50 + insulin)  -Will start isotonic bicarb gtt at 75cc/hr   -Per daughter (whom is also health care proxy), the patient had exhibited apathetic behavior since onset of Covid19 pandemic and had expressed his wish "to die" on several occasions however daughter is unclear if the patient have been 'depressed' - she would like to discuss goals of care with her mother tonight - okay with proceeding with hemodialysis tonight as per daughter (if unable to relieve obstruction)   -Of note, MOLST completed - DNR - daughter understands that the patient is at risk of arrhthymias from hyperkalemia - she expresses that should he develop cardiac arrest from arrhthymias then she would like for him to pass naturally/peacefully without heroic measures  -Will reassess the patient pending cobb placement by Urology   -Palliative consult in a.m. to assist with goals of care discussion (daughter is agreeable)    ADDENDUM (02/09/2023 at 6:30pm): Urology PA is unable to pass wire. Given inability to relieve obstructive uropathy and hyperkalemia, hemodialysis is warranted at this time. Discussed with ED team and MICU team. Placement of non tunneled dialysis catheter will be deferred to MICU team. HD charge RN and on call RN also made aware of need for dialysis tonight. Daughter was given an update over the phone. HD charge RN and on call RN also made aware of need for dialysis tonight.     Bubba Silverman DO  Nephrology  [facility]  Office Number [phone] The patient is a 77 year old male with PMH of HTN, HLD, CKD 3b (baseline creatinine ~1.9mg/dL in August 2022), hx of Prostate Ca s/p chemoradiation +/- ? prostatectomy, active Bladder Ca s/p resection x 2 and immunotherapy (last treatment was in August 2022) presents with weakness, anorexia, anuria and altered mental status. Labs showed K 8.5, , Cr 31.5. Nephrology was consulted for VAHE on CKD, hyperkalemia and evaluation for hemodialysis. Unable to obtain history from patient due to AMS. History was obtained from daughter (Yris Thakur) whom was present at bedside.    -VAHE (on CKD) is secondary to obstructive uropathy   -Baseline creatinine is 1.9mg/dL (in August 2022)  -On admission, creatinine is 31.5mg/dL  -Bedside ultrasound showed distended bladder   -ED RN is unable to place cobb despite several attempts  -Stat Urology consult placed by ED resident for cobb placement - awaiting placement of cobb by Urology at this time  -If unable to relief obstruction, then hemodialysis tonight (see below)   -Of note, potassium 8.5   -s/p medical management (calcium gluconate + D50 + insulin)  -Will start isotonic bicarb gtt at 75cc/hr   -Per daughter (whom is also health care proxy), the patient had exhibited apathetic behavior since onset of Covid19 pandemic and had expressed his wish "to die" on several occasions however daughter is unclear if the patient have been 'depressed' - she would like to discuss goals of care with her mother tonight - okay with proceeding with hemodialysis tonight as per daughter (if unable to relieve obstruction)   -Of note, MOLST completed - DNR - daughter understands that the patient is at risk of arrhthymias from hyperkalemia - she expresses that should he develop cardiac arrest from arrhthymias then she would like for him to pass naturally/peacefully without heroic measures  -Will reassess the patient pending cobb placement by Urology   -Palliative consult in a.m. to assist with goals of care discussion (daughter is agreeable)    ADDENDUM (02/09/2023 at 6:30pm): Urology PA is unable to pass wire. Given inability to relieve obstructive uropathy and hyperkalemia, hemodialysis is warranted at this time. Discussed with ED team and MICU team. Placement of non tunneled dialysis catheter will be deferred to MICU team. HD charge RN and on call RN also made aware of need for dialysis tonight. Daughter was given an update over the phone. HD charge RN and on call RN also made aware of need for dialysis tonight.     ADDENDUM ()2/09/2023 at ~7:30pm): Urology attending able to place cobb this evening. Currently with robust urine output. Will hold dialysis at this time - discussed with MICU team. Will check ABG with lytes in 1/2 hour to ensure improvement to electrolytes.     Bubba Silverman DO  Nephrology  [facility]  Office Number [phone] The patient is a 77 year old male with PMH of HTN, HLD, CKD 3b (baseline creatinine ~1.9mg/dL in August 2022), hx of Prostate Ca s/p chemoradiation +/- ? prostatectomy, active Bladder Ca s/p resection x 2 and immunotherapy (last treatment was in August 2022) presents with weakness, anorexia, anuria and altered mental status. Labs showed K 8.5, , Cr 31.5. Nephrology was consulted for VAHE on CKD, hyperkalemia and evaluation for hemodialysis. Unable to obtain history from patient due to AMS. History was obtained from daughter (Yris Thakur) whom was present at bedside.    -VAHE (on CKD) is secondary to obstructive uropathy   -Baseline creatinine is 1.9mg/dL (in August 2022)  -On admission, creatinine is 31.5mg/dL  -Bedside ultrasound showed distended bladder   -ED RN is unable to place cobb despite several attempts  -Stat Urology consult placed by ED resident for cobb placement - awaiting placement of cobb by Urology at this time  -If unable to relief obstruction, then hemodialysis tonight (see below)   -Of note, potassium 8.5   -s/p medical management (calcium gluconate + D50 + insulin)  -Will start isotonic bicarb gtt at 75cc/hr   -Per daughter (whom is also health care proxy), the patient had exhibited apathetic behavior since onset of Covid19 pandemic and had expressed his wish "to die" on several occasions however daughter is unclear if the patient have been 'depressed' - she would like to discuss goals of care with her mother tonight - okay with proceeding with hemodialysis tonight as per daughter (if unable to relieve obstruction)   -Of note, MOLST completed - DNR - daughter understands that the patient is at risk of arrhthymias from hyperkalemia - she expresses that should he develop cardiac arrest from arrhthymias then she would like for him to pass naturally/peacefully without heroic measures  -Will reassess the patient pending cobb placement by Urology   -Palliative consult in a.m. to assist with goals of care discussion (daughter is agreeable)    ADDENDUM (02/09/2023 at 6:30pm): Urology PA is unable to pass wire. Given inability to relieve obstructive uropathy and hyperkalemia, hemodialysis is warranted at this time. Discussed with ED team and MICU team. Placement of non tunneled dialysis catheter will be deferred to MICU team. HD charge RN and on call RN also made aware of need for dialysis tonight. Daughter was given an update over the phone. HD charge RN and on call RN also made aware of need for dialysis tonight.     ADDENDUM ()2/09/2023 at ~7:30pm): Urology attending able to place cobb this evening. Currently with robust urine output. Will hold dialysis at this time - discussed with MICU team. Will check ABG with lytes in 1/2 hour to ensure improvement to electrolytes.     ADDENDUM (02/09/2023 at 8pm): ABG with lytes reviewed - potassium better, 1L urine output this far, continue medical management with isotonic bicarb gtt. Will update daughter.     Bubba Silverman DO  Nephrology  [facility]  Office Number [phone] The patient is a 77 year old male with PMH of HTN, HLD, CKD 3b (baseline creatinine ~1.9mg/dL in August 2022), hx of Prostate Ca s/p chemoradiation +/- ? prostatectomy, active Bladder Ca s/p resection x 2 and immunotherapy (last treatment was in August 2022) presents with weakness, anorexia, anuria and altered mental status. Labs showed K 8.5, , Cr 31.5. Nephrology was consulted for VAHE on CKD, hyperkalemia and evaluation for hemodialysis. Unable to obtain history from patient due to AMS. History was obtained from daughter (Yris Thakur) whom was present at bedside.    -VAHE (on CKD) is secondary to obstructive uropathy   -Baseline creatinine is 1.9mg/dL (in August 2022)  -On admission, creatinine is 31.5mg/dL  -Bedside ultrasound showed distended bladder   -ED RN is unable to place cobb despite several attempts  -Stat Urology consult placed by ED resident for cobb placement - awaiting placement of cobb by Urology at this time  -If unable to relief obstruction, then hemodialysis tonight (see below)   -Of note, potassium 8.5   -s/p medical management (calcium gluconate + D50 + insulin)  -Will start isotonic bicarb gtt at 75cc/hr   -Per daughter (whom is also health care proxy), the patient had exhibited apathetic behavior since onset of Covid19 pandemic and had expressed his wish "to die" on several occasions however daughter is unclear if the patient have been 'depressed' - she would like to discuss goals of care with her mother tonight - okay with proceeding with hemodialysis tonight as per daughter (if unable to relieve obstruction)   -Of note, MOLST completed - DNR - daughter understands that the patient is at risk of arrhthymias from hyperkalemia - she expresses that should he develop cardiac arrest from arrhthymias then she would like for him to pass naturally/peacefully without heroic measures  -Will reassess the patient pending cobb placement by Urology   -Palliative consult in a.m. to assist with goals of care discussion (daughter is agreeable)    ADDENDUM (02/09/2023 at 6:30pm): Urology PA is unable to pass wire. Given inability to relieve obstructive uropathy and hyperkalemia, hemodialysis is warranted at this time. Discussed with ED team and MICU team. Placement of non tunneled dialysis catheter will be deferred to MICU team. HD charge RN and on call RN also made aware of need for dialysis tonight. Daughter was given an update over the phone. HD charge RN and on call RN also made aware of need for dialysis tonight.     ADDENDUM ()2/09/2023 at ~7:30pm): Urology attending able to place cobb this evening. Currently with robust urine output. Will hold dialysis at this time - discussed with MICU team. Will check ABG with lytes in 1/2 hour to ensure improvement to electrolytes.     ADDENDUM (02/09/2023 at 8pm): ABG with lytes reviewed - potassium better, >1L urine output thus far, continue medical management with isotonic bicarb gtt. Updated daughter over the phone.     Bubba Silverman DO  Nephrology  [facility]  Office Number [phone]

## 2023-02-09 NOTE — PROCEDURE NOTE - ADDITIONAL PROCEDURE DETAILS
Patient with difficult foely placement, multiple attempts made with coudets and wire, 12Fr coudet placed by Dr. Clayton
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
## 2023-02-09 NOTE — ED PROVIDER NOTE - ATTENDING CONTRIBUTION TO CARE
78yo M with PMH HTN, HLD, Chronic kidney disease (CKD), Bladder CA s/p immunotherapy and resection x2 who presents increased weakness, decrease po intake , urine output over the last 3 - 4 days; ; pt noted with + weight loss; pe awake heent ncat mucosa dry neck supple cor s1s2 lung clear abd + distention; nontender neuro moves all extremites;  dx weakness; ct of head ; abd/pelvis eval obstruction;

## 2023-02-09 NOTE — ED PROVIDER NOTE - EKG ADDITIONAL INFORMATION FREE TEXT
NSR, no axis deviation, no ischemic changes, intervals within normal limits, no concerning dysrhythmias.

## 2023-02-09 NOTE — ED PROVIDER NOTE - UNABLE TO OBTAIN
Dementia ROS obtained from daughter who is bedside. PT denies any complaints but unreliable given AMS.

## 2023-02-09 NOTE — ED PROVIDER NOTE - PROGRESS NOTE DETAILS
Iona: PT w/ obvious obstruction on ultrasound. Cannot pass Franco or coude catheter. Urology consulted for difficult Franco. Iona: Nephro consulted for [employer].

## 2023-02-09 NOTE — ED ADULT NURSE NOTE - OBJECTIVE STATEMENT
Pt sitting upright in stretcher in no apparent signs of distress. no complaints at this  time, labs and line done , PIV site WNL. fluids are running as ordered, Pt status unchanged, pt ID band in place, pt safety maintained, pt hemodynamically stable, updated on plan of care. Awaiting on labs results, . Call light provided, fall safety reinforced. Will continue to monitor

## 2023-02-09 NOTE — ED PROVIDER NOTE - NSICDXPASTMEDICALHX_GEN_ALL_CORE_FT
PAST MEDICAL HISTORY:  Chronic kidney disease (CKD) stage 3    CVA (cerebrovascular accident)     Dementia     Glaucoma     Gross hematuria     H/O cataract     HLD (hyperlipidemia)     [place] (hard of hearing)     HTN (hypertension)     Prostate cancer     Syncope several episodes origin [facility]
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
## 2023-02-09 NOTE — ED PROVIDER NOTE - CLINICAL SUMMARY MEDICAL DECISION MAKING FREE TEXT BOX
ASSESSMENT:   LEOLA MACHADO is a 78yo M who presented with failure to thrive in the setting of active bladder cancer. Physical w/ abdominal ttp, guarding. EKG w/o acute changes.     Concerning for failure to thrive. +/- abdominal pathology, UTI    PLAN: Screen for infection. CT abd/pelvis given TTP, guarding. Basic labs and reassessment. ASSESSMENT:   LEOLA MACHADO is a 76yo M who presented with failure to thrive in the setting of active bladder cancer. Physical w/ abdominal ttp, guarding. EKG w/o acute changes. POCUS w/ distended bladder.     Concerning for obstruction 2/2 CA vs strictures.     PLAN: Franco, ua+ culture.

## 2023-02-09 NOTE — ED PROVIDER NOTE - PHYSICAL EXAMINATION
VITAL SIGNS: I have reviewed vital signs  CONSTITUTIONAL:  in no acute distress.  SKIN: Warm, dry, no rash.  HEAD: Normocephalic, atraumatic.  EYES: PERRL, conjunctiva and sclera clear.  ENT: pink & moist mucosa, no pharyngeal erythema  NECK: Supple, non tender. No cervical lymphadenopathy.  CARD: Regular rate and rhythm. No murmurs.   RESP: No wheezes, rales or rhonchi.   ABD:  non distended, +DIFFUSELY TTP, VOLUNTARY GUARDING   MSK:  Good ROM in upper/lower extremities w/o pain.   NEURO: Alert, oriented. Grossly unremarkable. No focal deficits.   PSYCH: Cooperative, alert & oriented x3

## 2023-02-09 NOTE — CONSULT NOTE ADULT - SUBJECTIVE AND OBJECTIVE BOX
Subjective:  Aspartate Aminotransferase (AST/SGOT): 9 U/L (02.09.23 @ 14:15)    Urology consult called for urinary retention and difficult cobb placement.     Patient is a 76yo M with PMH HTN, HLD, Chronic kidney disease (CKD), Bladder CA s/p immunotherapy and resection x2, prostate cancer s/p radiation and prostatectomy, who presents w/ evidence of failure to thrive. Patient is oriented only to self which is a deviation from baseline of A+Ox3.     Patient is not stating any complaints but is guarding when suprapubic area is palpated. Abdomen distended, some urine leaking out of meatus. Patient gets combative when disturbed to examine  system.     labs significant for     131<L>  |  92<L>  |  161.4<H>  ----------------------------<  111<H>  8.5<HH>   |  14.0<L>  |  31.56<H>    Ca    8.9      09 Feb 2023 14:15  Phos  6.7     02-09  Mg     3.0     02-09    TPro  6.6  /  Alb  3.5  /  TBili  0.3<L>  /  DBili  x   /  AST  9   /  ALT  13  /  AlkPhos  71  02-09        MEDICATIONS  (STANDING):  chlorhexidine 2% Cloths 1 Application(s) Topical <User Schedule>  sodium bicarbonate  Infusion 0.156 mEq/kG/Hr (75 mL/Hr) IV Continuous <Continuous>    MEDICATIONS  (PRN):      Vital Signs Last 24 Hrs  T(C): 36.4 (09 Feb 2023 12:37), Max: 36.4 (09 Feb 2023 12:37)  T(F): 97.6 (09 Feb 2023 12:37), Max: 97.6 (09 Feb 2023 12:37)  HR: 93 (09 Feb 2023 17:17) (88 - 93)  BP: 114/59 (09 Feb 2023 17:17) (114/59 - 159/82)  BP(mean): 80 (09 Feb 2023 17:17) (80 - 80)  RR: 17 (09 Feb 2023 17:17) (16 - 17)  SpO2: 100% (09 Feb 2023 17:17) (100% - 100%)    Parameters below as of 09 Feb 2023 17:17  Patient On (Oxygen Delivery Method): room air        Physical Exam:    General: Laying in bed, no vocal complaints  HEENT: PERRL, EOMI  Neck: No JVD, FROM without pain  Respiratory: no accessory muscle use, respirations non-labored  Abdomen: suprapubic distension, tender to palpation  Neurological: oriented to self    A: 77M PMH HTN, HLD, Chronic kidney disease (CKD), Bladder CA s/p immunotherapy and resection x2, prostate cancer s/p radiation and prostatectomy, who presents w/ evidence of failure to thrive and in urinary retention    P:  Plan per primary team  Ocbb in place  Pain control  Labs/ f/u K and Cr  Nephro following, discussed with Dr. Silverman  Discussed with Dr. Clayton

## 2023-02-09 NOTE — ED ADULT NURSE REASSESSMENT NOTE - NS ED NURSE REASSESS COMMENT FT1
rcvd pt from RN CLAUDIO, pt A&Ox1- oriented to self, rcvd pt from RN CLAUDIO, pt A&Ox1- oriented to self, cardiac monitor in placed showing NSR, resp even and unlabored no distress noted, attempt to place cobb cath unsuccessful , MD Monson and neph MD MD Guzman made aware and at bedside, side rails up and bed in lowest position

## 2023-02-09 NOTE — ED ADULT NURSE NOTE - NSICDXPASTMEDICALHX_GEN_ALL_CORE_FT
PAST MEDICAL HISTORY:  Chronic kidney disease (CKD) stage 3    CVA (cerebrovascular accident)     Dementia     Glaucoma     Gross hematuria     H/O cataract     HLD (hyperlipidemia)     [place] (hard of hearing)     HTN (hypertension)     Prostate cancer     Syncope several episodes origin [facility]

## 2023-02-09 NOTE — ED PROVIDER NOTE - NS ED ROS FT
Review of Systems  CONSTITUTIONAL: afebrile w/no diaphoresis or weight changes +DECREASED PO INTAKE, GENERALIZED FATIGUE/WEAKNESS  SKIN: warm, dry w/ no rash or bleeding  EYES: no changes to vision  ENT: no changes in hearing, no sore throat  RESPIRATORY: no cough or SOB  CARDIAC: no chest pain & no palpitations  GI: no abd pain, nausea, vomiting, diarrhea, constipation, or blood in stool/teagan blood  GENITO-URINARY: no discharge, dysuria or hematuria,   MUSCULOSKELETAL:  no joint pain, swelling or redness  NEUROLOGIC: no headache, anesthesia or paresthesias  PSYCH: no anxiety, non suicidal, non homicidal, without hallucinations or depression

## 2023-02-10 NOTE — CHART NOTE - NSCHARTNOTEFT_GEN_A_CORE
Patient remains HD stable, never requiring pressors, hyper K+ steadily downtrending throughout the day, no EKG changes noted, BUN/CTN downtrending 150/30-->105/16, family has decided against HD being performed after extensive GOC w/Palliative. Patient medically stable for any monitored bed. Case discussed with Dr YESSENIA Viveros.

## 2023-02-10 NOTE — SWALLOW BEDSIDE ASSESSMENT ADULT - SWALLOW EVAL: DIAGNOSIS
Mild to moderate oral dysphagia for assessed solids & thin fluids, impacted by cognition. Pharyngeal stage of swallow clinically unremarkable across administered trials with no overt s/s aspiration noted post intake. It should be noted that behavioral overlay was noted at end of assessment with lip clenching, which may impact PO intake

## 2023-02-10 NOTE — PROGRESS NOTE ADULT - ASSESSMENT
The patient is a 77 year old male with PMH of HTN, HLD, CKD 3b (baseline creatinine ~1.9mg/dL in August 2022), hx of Prostate Ca s/p chemoradiation +/- ? prostatectomy, active Bladder Ca s/p resection x 2 and immunotherapy (last treatment was in August 2022) presents with weakness, anorexia, anuria and altered mental status. Labs showed K 8.5, , Cr 31.5. Nephrology was consulted for VAHE on CKD, hyperkalemia and evaluation for hemodialysis. Unable to obtain history from patient due to AMS. History was obtained from daughter (Yris Thakur) whom was present at bedside.    -VAHE (on CKD) is secondary to obstructive uropathy - likely ATN at this point  -Baseline creatinine is 1.9mg/dL (in August 2022)  -On admission, creatinine is 31.5mg/dL, K+ 8.5  -Bedside ultrasound showed distended bladder - difficult cobb placement  -no s/p cobb by urology  - Pt has robust UOP- BUN/ Scr slowly improving  - K+ remains elevated, however improved to 6.3-  - Repeat Insulin/ D50  - Continue bicarb gtt; increase to 150 ml/hr to match 1/2 of UOP  - Continue Lokelma  - Repeat BMP in 6 hours  - HD if no improvement

## 2023-02-10 NOTE — SWALLOW BEDSIDE ASSESSMENT ADULT - DATE
A/P:  1.  Htn:  Stable.  2.  Insomnia: stable.  3.  Hypercholesterolemia: Stable.  4.  Hypothyroidism: Stable.  Refilled meds.   S:  Chief Complaint   Patient presents with   • Hypertension     follow up   • Hyperlipidemia     follow up   • Hyperthyroidism     follow up     1.  Htn:  Stable.  2.  Insomnia: stable.  3.  Hypercholesterolemia: Stable.  4.  Hypothyroidism: Stable.    I have reviewed the patient's medications and allergies, past medical, surgical, social and family history, updating these as appropriate.  See Histories section of the EMR for a display of this information.    Review of Systems:   As above per the HPI otherwise essentially negative.  Constitutional: Negative for fever and chills.   Respiratory: Negative for cough and shortness of breath.    Cardiovascular: Negative for chest pain or chest pressure.     O:  Blood pressure 141/86, pulse 74, temperature 97.2 °F (36.2 °C), temperature source Tympanic, height 5' 6\" (1.676 m), weight 83.2 kg. Body mass index is 29.61 kg/m².  Heart:  no click, no gallop, no heaves, no murmur, no rub, no thrills, regular rate and rhythm, S1 normal and S2 normal  Lungs:  clear to auscultation anteriorly and posteriorly bilaterally and normal percussion posteriorly bilaterally  Abdomen:  bowel sounds normal, no hernias, no masses, no hepatomegaly or spenomegally, not tender and soft  Extremities:  normal range of motion of all joints, no clubbing, no cyanosis, no deformity noted and no edema    
Health Maintenance Summary     Topic Due On Due Status Completed On    MAMMOGRAM - BREAST CANCER SCREENING May 4, 2019 Not Due May 4, 2017    Colorectal Cancer Screening - Colonoscopy Jan 1, 2017 Overdue Jan 1, 2007    Pap Smear - Cervical Cancer Screening  Feb 17, 2020 Not Due Feb 17, 2015    Immunization-Zoster  Completed Dec 18, 2015    Immunization - TDAP Pregnancy  Hidden     IMMUNIZATION - DTaP/Tdap/Td Jul 18, 2023 Not Due Jul 18, 2013    Immunization-Influenza  Completed Sep 27, 2017     Sep 4, 1967 Overdue     Lung Cancer Screening Sep 4, 2010 Overdue           Patient is due for topics as listed above, she wishes to discuss with provider .    
10-Feb-2023

## 2023-02-10 NOTE — CONSULT NOTE ADULT - CONSULT REASON
Hyperkalemia, VAHE on CKD Stage 3b, Evaluation of Hemodialysis
" bladder and prostate CA, depressed"
urinary retention

## 2023-02-10 NOTE — PROGRESS NOTE ADULT - ASSESSMENT
ASSESSMENT:  77M with PMHX HTN, HLD, CVA, CKD, Bladder CA s/p intravesicular resection/chemo with Gemcitabine, Prostate CA s/p Radiation c/o AMS, weakness/lethargy/FTT admitted to MICU for Severe Hyperkalemia, ARF on CKD now ATN 2/2 Obstructive Uropathy, Metabolic Acidosis and Acute UTI.    PLAN:  Hyperkalemia 2/2 ARF on CKD now ATN 2/2 Obstructive Uropathy   -Transfer to Telemetry  -ARF improving. CR 28 > 26 > 19 > 16  -Bicarb gtt 150meq 100cc/hr -> Increase to 150cc/hr  -Discontinue LR 100cc/hr given hyperkalemia  -Match 1/2 IVF to UOP  -K down to 5.7  -Lokelma 10g PO q8 x3 doses  -Repeat Labs/VBG AM  -VTE PPX SQH [id number]  -Maintain Cobb Cath (placed by Urology)  -Nephro following (Velasquez)    Acute UTI  -Prior Hx Enteroccocus UTI  -BCX x2 pending  -UCX pending  -Zosyn 3.375g IV q12  -Renal Dose Abx    AMS  -Likely 2/2 Uremia but also encephalopathic 2/2 UTI and likely underlying dementia given reported hx  -Maintain Restricts for safety as patient reportedly attempted to remove cobb    Dysphagia, FTT, Debility  -Prealbumin in AM  -NPO for now   -Speech/Swallow eval    Bladder CA, Prostate CA  -CT abd/pelv revealed markedly distended bladder and a large soft tissue mass inseparable from posterior bladder and prostate gland  -s/p surgical intervention, radiation, chemo with Gemcitabine  -Maintain Cobb Cath    Goals of Care  -DNR/DNI/Do not want HD per patient and family wishes  -Likely plan for Hospice v ECF placement   -Palliative Following

## 2023-02-10 NOTE — H&P ADULT - HISTORY OF PRESENT ILLNESS
78 yo male, PMHx HTN, HLD, CVA 2019, CKD, bladder CA dx 2020 s/p intravesicular resection and Gemcitabine treatment, prostate cancer s/p radiation 2012, who presented from home with failure to thrive. History obtained from patient's daughter at bedside, who states patient was refusing to get out of bed or to eat and drink over the past several days. Additionally, he is now confused. Denies fever or chills, recent cough, vomiting, diarrhea, complaints of chest or abdominal pain. She states she brought both of her parents to come live with her family in her home, however has noticed a decline in her father and a change in his behavior over the past year. She describes shuffling gait and that he has become isolated in his room, does not engage with the family or participate in family meals, has largely stopped eating and drinking, and stopped taking his medications. Additionally, patient is fluent in English, [place]ian (he is native to [place]), and Amharic, however his wife has noticed episodic confusion/forgetting how to speak in Amharic. He has also been more agitated and combative. There is a family history of dementia, his mother suffered from Alzheimer's, as well as multiple strokes and coronary artery disease. Upon arrival to ED, he was found to have BUN/Cr 161/32, K 8.5, pH 7.25. He was given 1L crystalloid bolus. Indwelling cobb was unable to be passed and CT abd/pelv revealed markedly distended bladder and a large soft tissue mass inseparable from posterior bladder and prostate gland.

## 2023-02-10 NOTE — PROGRESS NOTE ADULT - ASSESSMENT
76 yo male pt with multiple medical issues, ARF, urinary retention b/l hydronephrosis, difficult cobb placement  - keep cobb in place, do not remove  - monitor I/O's  - cont care as per MICU team 76 yo male pt with multiple medical issues, ARF, urinary retention b/l hydronephrosis, difficult cobb placement  - keep cobb in place, do not remove  - monitor I/O's  - observe for post-obstructive diuresis  - cont care as per MICU team

## 2023-02-10 NOTE — SWALLOW BEDSIDE ASSESSMENT ADULT - COMMENTS
As per MD note: "77-year-old Marshallese male with past medical history of essential hypertension dyslipidemia ischemic CVA CKD bladder cancer  status post intravascular resection and gemcitabine treatment with history of prostate cancer with cognitive decline over the last 1 year presented with worsening weakness decreased p.o. intake admitted to medical ICU with severe acute kidney injury on CKD with refractory hyperkalemia and severe metabolic acidosis mostly postobstructive with possible  component of prerenal with possible complicated UTI   With acute delirium with underlying cognitive decline"
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
## 2023-02-10 NOTE — H&P ADULT - ASSESSMENT
76 yo male, PMHx HTN, HLD, CVA 2019, CKD, bladder CA dx 2020 s/p intravesicular resection and Gemcitabine treatment, prostate cancer s/p radiation 2012, who presented from home with failure to thrive and acute toxic-metabolic encephalopathy in the setting of VAHE on CKD with refractory hyperkalemia and metabolic acidosis in the presence of underlying bladder and prostate cancer with post-renal obstructive uropathy and subsequent hydronephrosis.    Admit to MICU  Case discussed extensively with Nephrologist Dr. Silverman, initially planned for emergent hemodialysis for clearance of critical hyperkalemia as Urology was initially unable to pass cobb or wire.  However, indwelling cobb was successfully placed by Urology and UOP has been brisk. Will defer hemodialysis at this time, especially given family's wishes for conservative management.  Despite relief of obstruction, patient continues to have refractory hyperkalemia  Hyperkalemia treated medically with multiple rounds of calcium, insulin, and sodium bicarbonate pushes as well as albuterol nebs  Sodium bicarbonate infusion increased to 100cc/hr  Given a total of 3L balanced crystalloid in divided doses overnight to match urine output  Passed dysphagia screen, started on standing Lokelma 10mg q8  Monitoring serial electrolytes closely  UA positive, history of Enterococcus UTI. Urine culture and blood cultures sent. Started on empiric antibiotics with Zosyn and Vancomycin x1  Heparin SC for  DVT ppx      CRITICAL CARE TIME SPENT: 65 minutes assessing presenting problems of acute critical illness, which pose high probability of life threatening deterioration or end organ damage/dysfunction, requiring frequent bedside reassessments and adjustments to plan of care, including medical decision making, initiating plan of care, reviewing data, reviewing radiologic exams, discussing with multidisciplinary team, discussing goals of care. Critical Care time is non-inclusive of independent time spent on procedures performed.       76 yo male, PMHx HTN, HLD, CVA 2019, CKD, bladder CA dx 2020 s/p intravesicular resection and Gemcitabine treatment, prostate cancer s/p radiation 2012, who presented from home with failure to thrive and acute toxic-metabolic encephalopathy in the setting of VAHE on CKD with refractory hyperkalemia and metabolic acidosis in the presence of underlying bladder and prostate cancer with post-renal obstructive uropathy and subsequent hydronephrosis, suspected Gram negative severe sepsis due to urinary tract infection.    Admit to MICU  Case discussed extensively with Nephrologist Dr. Silverman, initially planned for emergent hemodialysis for clearance of critical hyperkalemia as Urology was initially unable to pass cobb or wire.  However, indwelling cobb was successfully placed by Urology and UOP has been brisk. Will defer hemodialysis at this time, especially given family's wishes for conservative management.  Despite relief of obstruction, patient continues to have refractory hyperkalemia  Hyperkalemia treated medically with multiple rounds of calcium, insulin, and sodium bicarbonate pushes as well as albuterol nebs  Sodium bicarbonate infusion increased to 100cc/hr  Given a total of 3L balanced crystalloid in divided doses overnight to match urine output  Passed dysphagia screen, started on standing Lokelma 10mg q8  Monitoring serial electrolytes closely  UA positive, history of Enterococcus UTI. Urine culture and blood cultures sent. Started on empiric antibiotics with Zosyn and Vancomycin x1  Heparin SC for  DVT ppx      CRITICAL CARE TIME SPENT: 65 minutes assessing presenting problems of acute critical illness, which pose high probability of life threatening deterioration or end organ damage/dysfunction, requiring frequent bedside reassessments and adjustments to plan of care, including medical decision making, initiating plan of care, reviewing data, reviewing radiologic exams, discussing with multidisciplinary team, discussing goals of care. Critical Care time is non-inclusive of independent time spent on procedures performed.

## 2023-02-10 NOTE — PATIENT PROFILE ADULT - FALL HARM RISK - HARM RISK INTERVENTIONS
Assistance with ambulation/Assistance OOB with selected safe patient handling equipment/Communicate Risk of Fall with Harm to all staff/Discuss with provider need for PT consult/Monitor gait and stability/Reinforce activity limits and safety measures with patient and family/Tailored Fall Risk Interventions/Visual Cue: Yellow wristband and red socks/Bed in lowest position, wheels locked, appropriate side rails in place/Call bell, personal items and telephone in reach/Instruct patient to call for assistance before getting out of bed or chair/Non-slip footwear when patient is out of bed/[place] to call system/Physically safe environment - no spills, clutter or unnecessary equipment/Purposeful Proactive Rounding/Room/bathroom lighting operational, light cord in reach

## 2023-02-10 NOTE — PROGRESS NOTE ADULT - SUBJECTIVE AND OBJECTIVE BOX
[facility] DIVISION OF KIDNEY DISEASES AND HYPERTENSION -- FOLLOW UP NOTE  --------------------------------------------------------------------------------  Chief Complaint: Crescencio    24 hour events/subjective:  Pt seen and examined in MICU  non oliguric- UOP ~3.5 L        PAST HISTORY  --------------------------------------------------------------------------------  No significant changes to PMH, PSH, FHx, SHx, unless otherwise noted    ALLERGIES & MEDICATIONS  --------------------------------------------------------------------------------  Allergies    No Known Drug Allergies  shellfish (Anaphylaxis)    Intolerances      Standing Inpatient Medications  calcium gluconate IVPB 2 Gram(s) IV Intermittent once  chlorhexidine 2% Cloths 1 Application(s) Topical <User Schedule>  dextrose 50% Injectable 25 Gram(s) IV Push once  dextrose 50% Injectable 12.5 Gram(s) IV Push once  dextrose 50% Injectable 25 Gram(s) IV Push once  dextrose 50% Injectable 50 milliLiter(s) IV Push once  dextrose Oral Gel 15 Gram(s) Oral once  glucagon  Injectable 1 milliGRAM(s) IntraMuscular once  heparin   Injectable 5000 Unit(s) SubCutaneous every 12 hours  insulin regular  human recombinant 10 Unit(s) IV Push once  lactated ringers. 1000 milliLiter(s) IV Continuous <Continuous>  piperacillin/tazobactam IVPB.. 3.375 Gram(s) IV Intermittent every 12 hours  sodium bicarbonate  Infusion 0.208 mEq/kG/Hr IV Continuous <Continuous>  sodium zirconium cyclosilicate 10 Gram(s) Oral every 8 hours    PRN Inpatient Medications      REVIEW OF SYSTEMS  --------------------------------------------------------------------------------  Gen: No weight changes, fatigue, fevers/chills, weakness  Skin: No rashes  Head/Eyes/Ears/Mouth: No headache; Normal hearing; Normal vision w/o blurriness; No sinus pain/discomfort, sore throat  Respiratory: No dyspnea, cough, wheezing, hemoptysis  CV: No chest pain, PND, orthopnea  GI: No abdominal pain, diarrhea, constipation, nausea, vomiting, melena, hematochezia  : No increased frequency, dysuria, hematuria, nocturia  MSK: No joint pain/swelling; no back pain; no edema  Neuro: No dizziness/lightheadedness, weakness, seizures, numbness, tingling  Heme: No easy bruising or bleeding  Endo: No heat/cold intolerance  Psych: No significant nervousness, anxiety, stress, depression    All other systems were reviewed and are negative, except as noted.    VITALS/PHYSICAL EXAM  --------------------------------------------------------------------------------  T(C): 36.5 (02-10-23 @ 11:04), Max: 37.1 (02-09-23 @ 21:00)  HR: 106 (02-10-23 @ 10:00) (84 - 106)  BP: 153/83 (02-10-23 @ 10:00) (114/59 - 159/82)  RR: 17 (02-10-23 @ 10:00) (12 - 21)  SpO2: 100% (02-10-23 @ 10:00) (100% - 100%)  Wt(kg): --  Height (cm): 180.3 (02-09-23 @ 12:37)  Weight (kg): 78.7 (02-09-23 @ 21:03)  BMI (kg/m2): 24.2 (02-09-23 @ 21:03)  BSA (m2): 1.98 (02-09-23 @ 21:03)      02-09-23 @ 07:01  -  02-10-23 @ 07:00  --------------------------------------------------------  IN: 1100 mL / OUT: 3550 mL / NET: -2450 mL    02-10-23 @ 07:01  -  02-10-23 @ 12:23  --------------------------------------------------------  IN: 500 mL / OUT: 775 mL / NET: -275 mL      Physical Exam:  	Gen: NAD, well-appearing  	HEENT: PERRL, supple neck, clear oropharynx  	Pulm: CTA B/L  	CV: RRR, S1S2; no rub  	Back: No spinal or CVA tenderness; no sacral edema  	Abd: +BS, soft, nontender/nondistended  	: No suprapubic tenderness  	UE: Warm, FROM, no clubbing, intact strength; no edema; no asterixis  	LE: Warm, FROM, no clubbing, intact strength; no edema  	Neuro: No focal deficits, intact gait  	Psych: Normal affect and mood  	Skin: Warm, without rashes  	Vascular access:    LABS/STUDIES  --------------------------------------------------------------------------------              12.3   6.23  >-----------<  218      [02-10-23 @ 02:17]              37.0     137  |  97  |  121.0  ----------------------------<  157      [02-10-23 @ 11:26]  6.3   |  19.0  |  19.22        Ca     9.0     [02-10-23 @ 11:26]      Mg     2.6     [02-10-23 @ 06:23]      Phos  5.3     [02-10-23 @ 06:23]    TPro  6.5  /  Alb  3.7  /  TBili  0.4  /  DBili  x   /  AST  10  /  ALT  12  /  AlkPhos  71  [02-10-23 @ 02:17]          Creatinine Trend:  SCr 19.22 [02-10 @ 11:26]  SCr 26.30 [02-10 @ 06:23]  SCr 27.98 [02-10 @ 02:17]  SCr 30.24 [02-10 @ 00:00]  SCr 31.88 [02-09 @ 19:36]    Urinalysis - [02-09-23 @ 19:36]      Color Yellow / Appearance Clear / SG 1.005 / pH 7.0      Gluc 50 / Ketone Trace  / Bili Negative / Urobili Negative       Blood Large / Protein 100 / Leuk Est Moderate / Nitrite Negative      RBC 25-50 / WBC 11-25 / Hyaline  / Gran  / Sq Epi  / Non Sq Epi Occasional / Bacteria Occasional      TSH 2.31      [02-09-23 @ 14:15]

## 2023-02-10 NOTE — H&P ADULT - NSHPSOCIALHISTORY_GEN_ALL_CORE
Patient lives with his wife in his daughter Yris's house  He has three children, two of which are with a previous wife  He was formerly employed as a banker, then ultimately a  and retired about 3 years ago  He was initially born in [facility], speaks English, [place], and [place]

## 2023-02-10 NOTE — PROGRESS NOTE ADULT - SUBJECTIVE AND OBJECTIVE BOX
Subjective:77yMale admitted with ARF, urinary retention, pt with multiple medical issues, difficult cobb placement.    Cobb: yellow    Vital Signs Last 24 Hrs  T(C): 36.9 (10 Feb 2023 07:49), Max: 37.1 (09 Feb 2023 21:00)  T(F): 98.5 (10 Feb 2023 07:49), Max: 98.8 (09 Feb 2023 21:00)  HR: 89 (10 Feb 2023 08:00) (84 - 99)  BP: 130/73 (10 Feb 2023 08:00) (114/59 - 159/82)  BP(mean): 85 (10 Feb 2023 08:00) (80 - 108)  RR: 19 (10 Feb 2023 08:00) (12 - 21)  SpO2: 100% (10 Feb 2023 08:00) (100% - 100%)    Parameters below as of 10 Feb 2023 08:00  Patient On (Oxygen Delivery Method): room air      I&O's Detail    09 Feb 2023 07:01  -  10 Feb 2023 07:00  --------------------------------------------------------  IN:    Sodium Bicarbonate: 1100 mL  Total IN: 1100 mL    OUT:    Indwelling Catheter - Urethral (mL): 3550 mL  Total OUT: 3550 mL    Total NET: -2450 mL      10 Feb 2023 07:01  -  10 Feb 2023 09:57  --------------------------------------------------------  IN:    Sodium Bicarbonate: 100 mL  Total IN: 100 mL    OUT:    Indwelling Catheter - Urethral (mL): 350 mL  Total OUT: 350 mL    Total NET: -250 mL          Labs:                        12.3   6.23  )-----------( 218      ( 10 Feb 2023 02:17 )             37.0     02-10    136  |  94<L>  |  142.6<H>  ----------------------------<  135<H>  6.5<HH>   |  20.0<L>  |  26.30<H>    Ca    9.0      10 Feb 2023 06:23  Phos  5.3     02-10  Mg     2.6     02-10    TPro  6.5<L>  /  Alb  3.7  /  TBili  0.4  /  DBili  x   /  AST  10  /  ALT  12  /  AlkPhos  71  02-10

## 2023-02-10 NOTE — CONSULT NOTE ADULT - REASON FOR ADMISSION
Hyperkalemia, VAHE on CKD Stage 3b, Evaluation of Hemodialysis
Acute Renal Failure, Hyperkalemia, Metabolic Acidosis

## 2023-02-10 NOTE — SWALLOW BEDSIDE ASSESSMENT ADULT - SLP GENERAL OBSERVATIONS
Pt received & seen seated upright in bed, awake/alert, reduced cognition & command following, b/l wrist restraints, limited verbalizations, 02 sats: 100% on room air, 0/10 nonverbal pain scale pre/post

## 2023-02-10 NOTE — CONSULT NOTE ADULT - ASSESSMENT
77M with HTN, HLD, CKD, ischemic CVA, bladder cancer, prostate cancer admitted with failure to thrive, metabolic derangements with Acute Kidney Injury on CKD, severe hyperkalemia obstructive uropathy, concerns for UTI.     #1  77M with HTN, HLD, CKD, ischemic CVA, ? dementia, bladder cancer/prostate cancer admitted with failure to thrive, metabolic derangements with Acute Kidney Injury on CKD, severe hyperkalemia obstructive uropathy, concerns for UTI.     #1 Acute kidney injury  - supportive measures  - trend creatinine   - avoid nephrotoxins   - family does not want hemodialysis     #2 Encephalopathy, concerns for dementia with hixtory of CVA  - patient has been completely dependent with all activities of daily living more recently  - wife has been doing most of his activities of daily living like bathings, etc for 2 years  - reorientation     #3 Bladder Cancer, prostate cancer   - had 2 operations first in 2015 and reoccurrence leading to last one in october of 2021   - unclear stage     #4 Debility, cachexia   - assist with all activities of daily living  - check AM prealbumin     #5 Encounter for palliative care  - see goals of care   - summary: continue medical management, do not resuscitate and do not intubate, no hemodialysis if worsens. Family considering hospice care in home setting if medically stabilizes

## 2023-02-10 NOTE — CONSULT NOTE ADULT - CONVERSATION DETAILS
Discussed overall medical condition, prognosis, and options for plan of care with patient's daughter Yris. She expressed the changes dad has gone through and continued decline particularly in the last year. She expressed that during the pandemic he moved in with them and he has not been wanting to participate in activities, kids birthdays, with physical therapy, and because of this has significantly deteriorated. Per daughter, patient has had up front conversations with son in law and wife about not wanting any heroic or life sustaining measures such as CPR, mechanical ventilation, or hemodialysis. She discussed that they have all talked together as family and they just want to see if he can improve medically enough to figure out what the next step will be. We discussed home hospice as a good option to get them some extra resource and support at home particularly in the setting of the possibility this will happen again and that he would not likely want to come back to the hospital. Emotional support provided. Daughter open to hearing hospice option but she is also wondering if her father will be okay with going back to her house so they may need to entertain nursing facility.

## 2023-02-10 NOTE — PATIENT PROFILE ADULT - FUNCTIONAL ASSESSMENT - DAILY ACTIVITY SECTION LABEL
. Winlevi Pregnancy And Lactation Text: This medication is considered safe during pregnancy and breastfeeding.
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
## 2023-02-10 NOTE — H&P ADULT - NS PANP COMMENT GEN_ALL_CORE FT
77-year-old [place] male with past medical history of essential hypertension dyslipidemia ischemic CVA CKD bladder cancer  status post intravascular resection and gemcitabine treatment with history of prostate cancer with cognitive decline over the last 1 year presented with worsening weakness decreased p.o. intake admitted to medical ICU with severe acute kidney injury on CKD with refractory hyperkalemia and severe metabolic acidosis mostly postobstructive with possible  component of prerenal with possible complicated UTI   With acute delirium with underlying cognitive decline     goals of care addressed with family DNR/DNI with patient's wife and daughter as next of kin at bedside   palliative care consultation   for now in the ICU we will continue with intermittent boluses of IV crystalloids to match urine output, s/p  indwelling urinary catheter placement by urology, holding off of hemodialysis and treating hyperkalemia with transcellular agents as calcium insulin sodium bicarbonate pushes along with sodium bicarbonate infusion and starting Lokelma by mouth with fall and aspiration precaution  Pending urine culture, empiric Zosyn with vancomycin x1 with history of Enterococcus UTI   plan discussed with ICU team/patient's family at bedside.

## 2023-02-10 NOTE — SWALLOW BEDSIDE ASSESSMENT ADULT - ORAL PHASE
reduced attention to bolus observed/Decreased anterior-posterior movement of the bolus/Delayed oral transit time Within functional limits reduced attention  to bolus observed/Decreased anterior-posterior movement of the bolus/Delayed oral transit time oral swishing noted/Decreased anterior-posterior movement of the bolus/Delayed oral transit time

## 2023-02-10 NOTE — H&P ADULT - NSICDXPASTMEDICALHX_GEN_ALL_CORE_FT
ED General Adult HPI





- General


Chief complaint: Extremity Problem,Nontraumatic


Stated complaint: Fistula is bleeding


PUI?: No


Time Seen by Provider: 03/05/22 13:25


Source: patient, family, RN notes reviewed, old records reviewed


Mode of arrival: Ambulatory


Limitations: No Limitations





- History of Present Illness


Initial comments: 





Vascular surgery: [facility]





Nephrology: Dr Elvi Aragonese : [id number].  Patient also request that his daughter assist in

translation who is at the bedside.





The patient is an 81-year-old gentleman, who presents to the ER today with a 

complaint of painless left upper extremity fistula bleeding.  His daughter 

reports that she believes he got hemodialysis yesterday.  The patient denies 

additional injuries and complaints.








Consistency: now resolved





- Related Data


                                Home Medications











 Medication  Instructions  Recorded  Confirmed  Last Taken


 


Atorvastatin Calcium [Lipitor] 80 mg PO QDAY 07/10/21 12/14/21 Unknown


 


Pantoprazole [Protonix TAB] 40 mg PO QDAY 12/14/21 12/14/21 Unknown


 


carvediloL [Coreg] 25 mg PO BID 12/14/21 12/14/21 Unknown








                                  Previous Rx's











 Medication  Instructions  Recorded  Last Taken  Type


 


Aspirin EC [Halfprin EC] 81 mg PO QDAY #30 07/20/21 Unknown Rx


 


Calcium Acetate [Phoslo] 667 mg PO TIDWM #30 capsule 07/20/21 Unknown Rx


 


Furosemide [Lasix TAB] 80 mg PO QDAY #30 tablet 07/20/21 Unknown Rx


 


ISOSORBIDE MONOnitrate [Imdur ER] 30 mg PO QDAY #30 tablet 07/20/21 Unknown Rx


 


amLODIPine 10 mg PO QDAY #30 tablet 07/20/21 Unknown Rx











                                    Allergies











Allergy/AdvReac Type Severity Reaction Status Date / Time


 


No Known Allergies Allergy   Verified 12/14/21 11:07














ED Review of Systems


ROS: 


Stated complaint: POSSIBLE FISTULA INFECTION


Other details as noted in HPI





Constitutional: denies: fever


Respiratory: denies: cough


Cardiovascular: denies: chest pain


Gastrointestinal: denies: abdominal pain


Musculoskeletal: as per HPI


Hematological/Lymphatic: denies: easy bleeding





ED Past Medical Hx





- Past Medical History


Hx Hypertension: Yes


Hx Diabetes: Yes


Additional medical history: Aortic dissection





- Surgical History


Additional Surgical History: Aortic dissection repair





- Social History


Smoking Status: Never Smoker





- Medications


Home Medications: 


                                Home Medications











 Medication  Instructions  Recorded  Confirmed  Last Taken  Type


 


Atorvastatin Calcium [Lipitor] 80 mg PO QDAY 07/10/21 12/14/21 Unknown History


 


Aspirin EC [Halfprin EC] 81 mg PO QDAY #30 07/20/21 12/14/21 Unknown Rx


 


Calcium Acetate [Phoslo] 667 mg PO TIDWM #30 capsule 07/20/21 12/14/21 Unknown 

Rx


 


Furosemide [Lasix TAB] 80 mg PO QDAY #30 tablet 07/20/21 12/14/21 Unknown Rx


 


ISOSORBIDE MONOnitrate [Imdur ER] 30 mg PO QDAY #30 tablet 07/20/21 12/14/21 

Unknown Rx


 


amLODIPine 10 mg PO QDAY #30 tablet 07/20/21 12/14/21 Unknown Rx


 


Pantoprazole [Protonix TAB] 40 mg PO QDAY 12/14/21 12/14/21 Unknown History


 


carvediloL [Coreg] 25 mg PO BID 12/14/21 12/14/21 Unknown History














ED Physical Exam





- General


Limitations: Language Barrier


General appearance: alert, in no apparent distress





- Head


Head exam: Present: atraumatic, normocephalic





- Eye


Eye exam: Present: normal appearance, EOMI.  Absent: nystagmus





- ENT


ENT exam: Present: normal exam, normal orophraynx, mucous membranes moist, 

normal external ear exam





- Neck


Neck exam: Present: normal inspection, full ROM.  Absent: tenderness, 

meningismus





- Respiratory


Respiratory exam: Present: normal lung sounds bilaterally.  Absent: respiratory 

distress, wheezes, rales, rhonchi, stridor, decreased breath sounds





- Cardiovascular


Cardiovascular Exam: Present: regular rate, normal rhythm, normal heart sounds. 

 Absent: bradycardia, tachycardia, irregular rhythm, systolic murmur, diastolic 

murmur, rubs, gallop





- GI/Abdominal


GI/Abdominal exam: Present: soft.  Absent: distended, tenderness, guarding, 

rebound, rigid, pulsatile mass





- Rectal


Rectal exam: Present: deferred





- Extremities Exam


Extremities exam: Present: full ROM, other (1+ radial pulse in the right upper 

extremity.  2+ femoral pulses noted in the bilateral lower extremities.  There 

is no long bony tenderness.  The muscular compartments are soft.  The pelvis is 

stable.).  Absent: normal inspection (There is a left upper extremity fistula, 

with no active bleeding at this time.  The left distal upper extremity is 

swollen and ecchymotic.  1+ radial pulse appreciated.  3-second capillary refill

 appreciated in the upper extremities), calf tenderness





- Back Exam


Back exam: Present: normal inspection.  Absent: tenderness, CVA tenderness (R), 

CVA tenderness (L), paraspinal tenderness, vertebral tenderness





- Neurological Exam


Neurological exam: Present: alert, normal gait, other (There is no facial droop.

  The tongue is midline.  EOMI.  5 out of 5 strength in 4 extremities)





- Psychiatric


Psychiatric exam: Present: flat affect





- Skin


Skin exam: Present: warm, dry, intact, normal color, ecchymosis (There is a left

 upper extremity ecchymosis).  Absent: rash





ED Course


                                   Vital Signs











  03/05/22 03/05/22 03/05/22





  13:29 15:04 16:20


 


Temperature  97.5 F L 97.5 F L


 


Pulse Rate 87  69


 


Respiratory 16  16





Rate   


 


Blood Pressure   122/67


 


Blood Pressure 107/65  





[Left]   


 


O2 Sat by Pulse 100  96





Oximetry   














- Reevaluation(s)


Reevaluation #1: 





03/05/22 15:25


Differential diagnosis, including but not limited to: Arterial insufficiency, 

arterial stenosis, postprocedural ecchymosis and swelling, AV fistula in place, 

end-stage renal disease





Assessment and plan: 81-year-old gentleman, who is afebrile, with reassuring 

vital signs, without evidence of actively bleeding upper extremity fistula, 

presenting with essentially of painless left upper extremity swelling from the 

distal forearm, with difficult to appreciate pulses in the radial distribution, 

without evidence of redness, pus or streaking.  Upper extremity arterial study 

shows no evidence of arterial insufficiency.  Upper extremity hemodialysis 

access study s is reviewed and appreciated he reportedly had a procedure 

performed with [facility] at the end of February, but these medical 

records are not available for my review, and the daughter does not know 

specifically what he had.  She does think that he may have had a left-sided 

vascular access catheter placed in his left hemithorax.  This catheter does not 

have redness, pus or streaking and does not appear to be acutely infected and it

 is nontender.  Laboratory studies show chronic abnormalities, but no emergent 

findings are noted on laboratory studies that would require hemodialysis.





Patient moving his upper extremity without difficulty, and endorses appropriate 

sensation to light touch.





I redressed the patient's left upper extremity, and did not appreciate any 

bleeding or any pulsatility.  We will discuss with vascular surgeon as a 

courtesy, but I do not anticipate the need to ask, or to obtain advanced imaging


03/05/22 15:35





Discussed the patient's history, physical, laboratory studies and imaging 

studies with patient's primary nephrologist, Dr Thornton, and Dr Lugo, vascular 

surgeon on-call for our hospital/emergency department.





Dr. Lugo has personally reviewed this patient's imaging studies, and his 

interpretations and recommendations are reviewed and appreciated.  Specifically 

advises that the patient may use his vascular access catheter in the left 

hemithorax for the purposes of hemodialysis, and he will need to follow-up with 

his outpatient primary vascular surgeon for further outpatient evaluation for 

left upper extremity fistula.





The patient's aforementioned nephrologist will coordinate this recommendation, 

and make certain that the patient receives hemodialysis only through his left 

vascular access catheter


03/05/22 16:02








ED Medical Decision Making





- Lab Data


Result diagrams: 


                                 03/05/22 13:34





                                 03/05/22 13:34








                                   Vital Signs











  03/05/22





  13:29


 


Pulse Rate 87


 


Respiratory 16





Rate 


 


Blood Pressure 107/65





[Left] 


 


O2 Sat by Pulse 100





Oximetry 











                                   Lab Results











  03/05/22 03/05/22 03/05/22 Range/Units





  13:34 13:34 13:34 


 


WBC  5.6    (4.5-11.0)  K/mm3


 


RBC  4.50    (3.65-5.03)  M/mm3


 


Hgb  9.8 L    (11.8-15.2)  gm/dl


 


Hct  29.5 L    (35.5-45.6)  %


 


MCV  66 L    (84-94)  fl


 


MCH  22 L    (28-32)  pg


 


MCHC  33    (32-34)  %


 


RDW  19.0 H    (13.2-15.2)  %


 


Plt Count  152    (140-440)  K/mm3


 


Lymph % (Auto)  18.7    (13.4-35.0)  %


 


Mono % (Auto)  9.9 H    (0.0-7.3)  %


 


Eos % (Auto)  9.1 H    (0.0-4.3)  %


 


Baso % (Auto)  1.1    (0.0-1.8)  %


 


Lymph # (Auto)  1.1 L    (1.2-5.4)  K/mm3


 


Mono # (Auto)  0.6    (0.0-0.8)  K/mm3


 


Eos # (Auto)  0.5 H    (0.0-0.4)  K/mm3


 


Baso # (Auto)  0.1    (0.0-0.1)  K/mm3


 


Seg Neutrophils %  61.2    (40.0-70.0)  %


 


Seg Neutrophils #  3.5    (1.8-7.7)  K/mm3


 


PT   14.2   (12.2-14.9)  Sec.


 


INR   0.99   (0.87-1.13)  


 


APTT   39.8 H   (24.2-36.6)  Sec.


 


Sodium    134 L  (137-145)  mmol/L


 


Potassium    3.6  (3.6-5.0)  mmol/L


 


Chloride    92.6 L  ()  mmol/L


 


Carbon Dioxide    25  (22-30)  mmol/L


 


Anion Gap    20  mmol/L


 


BUN    24 H  (9-20)  mg/dL


 


Creatinine    4.2 H  (0.8-1.3)  mg/dL


 


Estimated GFR    14  ml/min


 


BUN/Creatinine Ratio    6  %


 


Glucose    200 H  ()  mg/dL


 


Calcium    8.7  (8.4-10.2)  mg/dL


 


Total Creatine Kinase    94  ()  units/L














- Radiology Data


Radiology results: pending, report reviewed, image reviewed





DUPLEX DOPPLER UPPER EXTREMITY ARTERIAL, LEFT  INDICATION / CLINICAL 

INFORMATION: left arm swelling dec pulses.  TECHNIQUE: Arterial duplex examinati

on of the left upper extremity performed using B-mode, color flow and spectral 

Doppler assessment.  FINDINGS:  LEFT: Axillary:  cm/sec. Triphasic 

waveform. Brachial:  cm/sec. Triphasic waveform. Radial: PSV 48 cm/sec. 

Triphasic waveform. Ulnar: PSV 29 cm/sec. Triphasic waveform.   IMPRESSION: 1. 

No significant upper extremity peripheral artery disease.   Doppler Waveform: - 

Triphasic is normal. - Biphasic is abnormal if clear transition from triphasic 

signal along vascular tree. - Monophasic is abnormal.    Signer Name: Srinivasan Rodriguez MD Signed: 3/5/2022 2:09 PM Workstation Name: [id number]








VL hemodialysis access  INDICATION: left arm swelling bleeding.  TECHNIQUE: AV 

fistula left arm evaluation  COMPARISON: None available.  FINDINGS: The left 

brachial cephalic AV fistula is patent without evidence of stenosis. The inflow 

brachial artery is patent. There is scattered subcutaneous edema throughout the 

left upper extremity.  Inflow velocity measures 122 cm/s.  IMPRESSION: 1. Left 

brachiocephalic AV fistula appears intact with appropriate flow.  Signer Name: 

Srinivasan Rodriguez MD Signed: 3/5/2022 2:08 PM Workstation Name: [id number]


Critical care attestation.: 


If time is entered above; I have spent that time in minutes in the direct care 

of this critically ill patient, excluding procedure time.








ED Disposition


Clinical Impression: 


 ESRD (end stage renal disease), Left arm swelling, Complication of art

eriovenous dialysis fistula





Disposition: 01 HOME / SELF CARE / HOMELESS


Is pt being admited?: No


Does the pt Need Aspirin: No


Condition: Good


Additional Instructions: 


Patient may use the left-sided chest of vascular access catheter for the 

purposes of hemodialysis.





The patient should not use the left upper extremity fistula for the purposes of 

hemodialysis.  We recommend that the patient follow-up with his outpatient 

vascular surgeon within the next 3 to 5 days for repeat checkup and evaluation, 

and evaluation for possible outpatient fistulogram in the left upper extremity. 

 Please have your vascular surgeon contact the medical records department to 

obtain copies of laboratory studies and radiology studies.  Please follow-up on 

Monday as scheduled, and make certain to inform hemodialysis team that patient 

is only to receive hemodialysis through his left sided chest vascular access 

catheter, but not in his left upper extremity.  Keep the left upper extremity 

covered otherwise.





Please return to the emergency room right away with new pain, worsened pain, alcira

ration of pain, projectile vomiting, change in mental status, confusion, 

inability tolerate liquid feeds, new, worsened or different symptoms not present

 on the initial emergency room evaluation


Referrals: 


[facility] - 3-5 Days


SHEEBA THORNTON MD [Staff Physician] - 3-5 Days PAST MEDICAL HISTORY:  Chronic kidney disease (CKD) stage 3    CVA (cerebrovascular accident)     Dementia     Glaucoma     Gross hematuria     H/O cataract     HLD (hyperlipidemia)     [place] (hard of hearing)     HTN (hypertension)     Prostate cancer     Syncope several episodes origin [facility]

## 2023-02-11 NOTE — PROGRESS NOTE ADULT - ASSESSMENT
-The patient is a 77 year old male with PMH of HTN, HLD, CKD 3b (baseline creatinine ~1.9mg/dL in August 2022), hx of Prostate Ca s/p chemoradiation +/- ? prostatectomy, active Bladder Ca s/p resection x 2 and immunotherapy (last treatment was in August 2022) presents with weakness, anorexia, anuria and altered mental status. Labs showed K 8.5, , Cr 31.5. Nephrology was consulted for VAHE on CKD, hyperkalemia and evaluation for hemodialysis. Unable to obtain history from patient due to AMS. History was obtained from daughter (Yris Thakur) whom was present at bedside.    -VAHE (on CKD) is secondary to obstructive uropathy - likely ATN at this point  -Baseline creatinine is 1.9mg/dL (in August 2022)  -On admission, creatinine is 31.5mg/dL, K+ 8.5  -Bedside ultrasound showed distended bladder, s/p cobb  placement  - BUN/ Scr trending down; K+ 5.7  - Pt is polyuric; UOP ~ 6400 ml/hr  - serum Co2 ~ 33; d/c bicarb gtt and start 1/2 NS at 100 cc/hr  - Continue Lokelma  - No indication for HD

## 2023-02-11 NOTE — BRIEF OPERATIVE NOTE - NSICDXBRIEFPREOP_GEN_ALL_CORE_FT
PRE-OP DIAGNOSIS:  Clot retention of urine 11-Feb-2023 20:27:55  Amadou Noel  Radiation cystitis 11-Feb-2023 20:28:07  Amadou Noel  Prostate cancer 11-Feb-2023 20:28:18  Amadou Noel

## 2023-02-11 NOTE — CHART NOTE - NSCHARTNOTEFT_GEN_A_CORE
Post-op Check    Subjective:  Pt offers no acute complaints at this time. Pain well controlled on current regiment. Denies chest pain, SOB, palpitations.     STATUS POST:  Evacuation, clots, bladder     POST OPERATIVE DAY #: 0    MEDICATIONS  (STANDING):  chlorhexidine 2% Cloths 1 Application(s) Topical <User Schedule>  dextrose 50% Injectable 25 Gram(s) IV Push once  dextrose 50% Injectable 12.5 Gram(s) IV Push once  dextrose 50% Injectable 25 Gram(s) IV Push once  dextrose Oral Gel 15 Gram(s) Oral once  glucagon  Injectable 1 milliGRAM(s) IntraMuscular once  heparin   Injectable 5000 Unit(s) SubCutaneous every 12 hours  piperacillin/tazobactam IVPB.. 3.375 Gram(s) IV Intermittent every 12 hours  sodium chloride 0.45%. 1000 milliLiter(s) (100 mL/Hr) IV Continuous <Continuous>  sodium zirconium cyclosilicate 10 Gram(s) Oral daily    MEDICATIONS  (PRN):      Vital Signs Last 24 Hrs  T(C): 36.6 (12 Feb 2023 04:24), Max: 37 (11 Feb 2023 06:25)  T(F): 97.8 (12 Feb 2023 04:24), Max: 98.6 (11 Feb 2023 06:25)  HR: 97 (12 Feb 2023 04:24) (70 - 98)  BP: 167/89 (12 Feb 2023 04:24) (156/75 - 176/98)  BP(mean): 94 (11 Feb 2023 22:00) (94 - 129)  RR: 18 (12 Feb 2023 04:24) (12 - 20)  SpO2: 98% (12 Feb 2023 04:24) (98% - 100%)    Parameters below as of 11 Feb 2023 22:42  Patient On (Oxygen Delivery Method): room air        Physical Exam:    Constitutional: NAD  HEENT: PERRL, EOMI  Neck: No JVD, FROM without pain  Respiratory: no accessory muscle use, respirations non-labored  GI: soft, NT/ND  Neurological: A&O x 3; without gross deficit  Genitourinary: CBI running with light pinl output, no clots    A: 77M s/p Cysto, clot evacuation and 20Fr 3-way cobb placement    P:  Continue current care  Pain control  OOB as tolerated  Encourage IS  DVT ppx

## 2023-02-11 NOTE — PROGRESS NOTE ADULT - SUBJECTIVE AND OBJECTIVE BOX
[facility] DIVISION OF KIDNEY DISEASES AND HYPERTENSION -- FOLLOW UP NOTE  --------------------------------------------------------------------------------  Chief Complaint: Crescencio    24 hour events/subjective:  Pt transferred out of MICU  UOP ~ 6400 ml/24 hours      PAST HISTORY  --------------------------------------------------------------------------------  No significant changes to PMH, PSH, FHx, SHx, unless otherwise noted    ALLERGIES & MEDICATIONS  --------------------------------------------------------------------------------  Allergies    No Known Drug Allergies  shellfish (Anaphylaxis)    Standing Inpatient Medications  chlorhexidine 2% Cloths 1 Application(s) Topical <User Schedule>  dextrose 50% Injectable 25 Gram(s) IV Push once  dextrose 50% Injectable 12.5 Gram(s) IV Push once  dextrose 50% Injectable 25 Gram(s) IV Push once  dextrose Oral Gel 15 Gram(s) Oral once  glucagon  Injectable 1 milliGRAM(s) IntraMuscular once  heparin   Injectable 5000 Unit(s) SubCutaneous every 12 hours  piperacillin/tazobactam IVPB.. 3.375 Gram(s) IV Intermittent every 12 hours  sodium chloride 0.45%. 1000 milliLiter(s) IV Continuous <Continuous>          REVIEW OF SYSTEMS  --------------------------------------------------------------------------------  Gen: No weight changes, fatigue, fevers/chills, weakness  Skin: No rashes  Head/Eyes/Ears/Mouth: No headache; Normal hearing; Normal vision w/o blurriness; No sinus pain/discomfort, sore throat  Respiratory: No dyspnea, cough, wheezing, hemoptysis  CV: No chest pain, PND, orthopnea  GI: No abdominal pain, diarrhea, constipation, nausea, vomiting, melena, hematochezia  : No increased frequency, dysuria, hematuria, nocturia  MSK: No joint pain/swelling; no back pain; no edema  Neuro: No dizziness/lightheadedness, weakness, seizures, numbness, tingling  Heme: No easy bruising or bleeding  Endo: No heat/cold intolerance  Psych: No significant nervousness, anxiety, stress, depression    All other systems were reviewed and are negative, except as noted.    VITALS/PHYSICAL EXAM  --------------------------------------------------------------------------------  T(C): 37 (02-11-23 @ 06:25), Max: 37.1 (02-11-23 @ 00:00)  HR: 93 (02-11-23 @ 06:25) (80 - 121)  BP: 158/81 (02-11-23 @ 06:25) (107/62 - 168/86)  RR: 13 (02-11-23 @ 06:25) (10 - 22)  SpO2: 98% (02-11-23 @ 06:25) (98% - 100%)  Wt(kg): --  Height (cm): 180.3 (02-09-23 @ 12:37)  Weight (kg): 78.7 (02-09-23 @ 21:03)  BMI (kg/m2): 24.2 (02-09-23 @ 21:03)  BSA (m2): 1.98 (02-09-23 @ 21:03)      02-10-23 @ 07:01  -  02-11-23 @ 07:00  --------------------------------------------------------  IN: 4425 mL / OUT: 6400 mL / NET: -1975 mL    02-11-23 @ 07:01  -  02-11-23 @ 09:24  --------------------------------------------------------  IN: 0 mL / OUT: 150 mL / NET: -150 mL      Physical Exam:  	Gen: NAD  	HEENT: supple neck  	Pulm: CTA B/L  	CV: RRR, S1S2; no rub  	Back: no sacral edema  	Abd: +BS, soft, nontender/nondistended  	: No suprapubic tenderness  	UE: Warm, no edema  	LE: Warm, no edema  	Neuro: confused  	Skin: Warm    LABS/STUDIES  --------------------------------------------------------------------------------              13.6   7.60  >-----------<  239      [02-11-23 @ 04:52]              40.5     145  |  98  |  82.8  ----------------------------<  176      [02-11-23 @ 04:52]  5.7   |  33.0  |  13.48        Ca     9.2     [02-11-23 @ 04:52]      Mg     2.1     [02-11-23 @ 04:52]      Phos  4.6     [02-11-23 @ 04:52]    TPro  6.5  /  Alb  3.7  /  TBili  0.4  /  DBili  x   /  AST  10  /  ALT  12  /  AlkPhos  71  [02-10-23 @ 02:17]          Creatinine Trend:  SCr 13.48 [02-11 @ 04:52]  SCr 16.03 [02-10 @ 17:17]  SCr 19.22 [02-10 @ 11:26]  SCr 26.30 [02-10 @ 06:23]  SCr 27.98 [02-10 @ 02:17]    Urinalysis - [02-09-23 @ 19:36]      Color Yellow / Appearance Clear / SG 1.005 / pH 7.0      Gluc 50 / Ketone Trace  / Bili Negative / Urobili Negative       Blood Large / Protein 100 / Leuk Est Moderate / Nitrite Negative      RBC 25-50 / WBC 11-25 / Hyaline  / Gran  / Sq Epi  / Non Sq Epi Occasional / Bacteria Occasional      TSH 2.31      [02-09-23 @ 14:15]    HBsAb <3.0      [02-09-23 @ 19:36]  HBsAb Nonreact      [02-09-23 @ 19:36]  HBsAg Nonreact      [02-09-23 @ 19:36]  HBcAb Nonreact      [02-09-23 @ 19:36]  HCV 0.09, Nonreact      [02-09-23 @ 19:36]

## 2023-02-11 NOTE — BRIEF OPERATIVE NOTE - OPERATION/FINDINGS
100 ml clot with significant radiation changes on the trigone. stricture in the membranous urethra which did accommodate scope

## 2023-02-11 NOTE — PROGRESS NOTE ADULT - ASSESSMENT
77M with PMHX HTN, HLD, CVA, CKD, Bladder CA s/p intravesicular resection/chemo with Gemcitabine, Prostate CA s/p Radiation c/o AMS, weakness/lethargy/FTT admitted to MICU for Severe Hyperkalemia, ARF on CKD now ATN 2/2 Obstructive Uropathy, Metabolic Acidosis and Acute UTI. Franco was placed pt was started on ivf and lokelma with improved kidney function, Nephrology team follows.       Sever obstructive ARF c/b Hyperkalemia in pt with known hx of prostate Cr   - bmp noted  - ivf adjusted to 1/2 NSS @ 100 cc/hr  - c/w lokelma 10 mg daily  -Maintain Franco Cath (placed by Urology)  -Nephro consult noted    Acute UTI  -Prior Hx Enteroccocus UTI  -BCX x2 pending  -UCX pending  -Zosyn 3.375g IV q12  -Renal Dose Abx    AMS due to uremia and hospital aquired delirium  - managemant as above  -Maintain Restricts for safety as patient reportedly attempted to remove Franco    Dysphagia, FTT, Debility  -resumed diet given pt did well with bedside screening     Bladder CA, Prostate CA  -CT abd/pelv revealed markedly distended bladder and a large soft tissue mass inseparable from posterior bladder and prostate gland  -s/p surgical intervention, radiation, chemo with Gemcitabine  -Maintain Franco Cath    DVT ppx  - lovenox  Code/social  - DNR/DNI/Do not want HD per patient and family wishes  Dispo -Likely plan for Hospice v ECF placement, once medically stable and off restrains

## 2023-02-11 NOTE — PROGRESS NOTE ADULT - SUBJECTIVE AND OBJECTIVE BOX
Called to follow up patient today this afternoon for non-draining cobb with hematuria most of day,     Patient in bed with hand restraints in place  awake in bed in mild discomfort, confused    Vital Signs Last 24 Hrs    T(C): 36.5 (11 Feb 2023 10:27), Max: 37.1 (11 Feb 2023 00:00)  T(F): 97.7 (11 Feb 2023 10:27), Max: 98.8 (11 Feb 2023 00:00)  HR: 87 (11 Feb 2023 10:27) (87 - 121)  BP: 167/84 (11 Feb 2023 10:27) (107/62 - 168/86)  BP(mean): 107 (11 Feb 2023 06:25) (78 - 125)  RR: 13 (11 Feb 2023 06:25) (10 - 22)  SpO2: 98% (11 Feb 2023 10:27) (98% - 100%)      Abdomen:  flat with supra pubic discomfort . noted earlier exams documented n/t abdomen.   : cobb 12F coude in place , was draining until recently and not draining and they have noted since this morning hematuria noted.  no clots noted in cobb bag.   Bladder scan by nurse at bedside with approx 130cc , repeat 133cc noted     Patient prepped at bedside - cobb catheter was attempted to irrigate cobb but no return, when reattached cobb to bag noted cobb would drain little bit solution irrigated only about 10cc sterile water to get return.        labs:   02-11    145  |  98  |  82.8<H>  ----------------------------<  176<H>  5.7<H>   |  33.0<H>  |  13.48<H>        Impression: non-functioning 12F cobb catheter, discussed with Surgeon present situation recommended care  Plan:  patient to be prepared for the OR for emergency cystoscopy and any necessary procedure with replacement of cobb   Discussed with daughter present situation and recommendations and she agrees - Dr. Noel will obtain consent when patient in SDS.    Called to follow up patient today this afternoon for non-draining cobb with hematuria most of day,     Patient in bed with hand restraints in place  awake in bed in mild discomfort, confused    Vital Signs Last 24 Hrs    T(C): 36.5 (11 Feb 2023 10:27), Max: 37.1 (11 Feb 2023 00:00)  T(F): 97.7 (11 Feb 2023 10:27), Max: 98.8 (11 Feb 2023 00:00)  HR: 87 (11 Feb 2023 10:27) (87 - 121)  BP: 167/84 (11 Feb 2023 10:27) (107/62 - 168/86)  BP(mean): 107 (11 Feb 2023 06:25) (78 - 125)  RR: 13 (11 Feb 2023 06:25) (10 - 22)  SpO2: 98% (11 Feb 2023 10:27) (98% - 100%)      Abdomen:  flat with supra pubic discomfort . noted earlier exams documented n/t abdomen.   : cobb 12F coude in place , was draining until recently and not draining and they have noted since this morning hematuria noted.  no clots noted in cobb bag.   Bladder scan by nurse at bedside with approx 130cc , repeat 133cc noted     Patient prepped at bedside - cobb catheter was attempted to irrigate cobb but no return, when reattached cobb to bag noted cobb would drain little bit solution irrigated only about 10cc sterile water to get return.        labs:   02-11    145  |  98  |  82.8<H>  ----------------------------<  176<H>  5.7<H>   |  33.0<H>  |  13.48<H>        Impression: non-functioning 12F cobb catheter, discussed with Surgeon present situation recommended care. Also contact and discussed plan for cystoscopy with Dr. Meyer keep her in touch with procedure.    Plan:  patient to be prepared for the OR for emergency cystoscopy and any necessary procedure with replacement of cobb   Discussed with daughter present situation and recommendations and she agrees - Dr. Noel will obtain consent when patient in SDS.    Called to follow up patient today this afternoon for non-draining cobb with hematuria most of day,     Patient in bed with hand restraints in place  awake in bed in mild discomfort, confused    Vital Signs Last 24 Hrs    T(C): 36.5 (11 Feb 2023 10:27), Max: 37.1 (11 Feb 2023 00:00)  T(F): 97.7 (11 Feb 2023 10:27), Max: 98.8 (11 Feb 2023 00:00)  HR: 87 (11 Feb 2023 10:27) (87 - 121)  BP: 167/84 (11 Feb 2023 10:27) (107/62 - 168/86)  BP(mean): 107 (11 Feb 2023 06:25) (78 - 125)  RR: 13 (11 Feb 2023 06:25) (10 - 22)  SpO2: 98% (11 Feb 2023 10:27) (98% - 100%)      Abdomen:  flat with supra pubic discomfort . noted earlier exams documented n/t abdomen.   : cobb 12F coude in place , was draining until recently and not draining and they have noted since this morning hematuria noted.  no clots noted in cobb bag.   Bladder scan by nurse at bedside with approx 130cc , repeat 133cc noted     Patient prepped at bedside - cobb catheter was attempted to irrigate cobb but no return, when reattached cobb to bag noted cobb would drain little bit solution irrigated only about 10cc sterile water to get return.        labs:   02-11    145  |  98  |  82.8<H>  ----------------------------<  176<H>  5.7<H>   |  33.0<H>  |  13.48<H>      Patients last meal approximately 14:30 PM surgeon aware.       Impression: non-functioning 12F cobb catheter, discussed with Surgeon present situation recommended care. Also contact and discussed plan for cystoscopy with Dr. Meyer keep her in touch with procedure.    Plan:  patient to be prepared for the OR for emergency cystoscopy and any necessary procedure with replacement of cobb   Discussed with daughter present situation and recommendations and she agrees - Dr. Noel will obtain consent when patient in SDS.

## 2023-02-11 NOTE — CHART NOTE - NSCHARTNOTEFT_GEN_A_CORE
Called by RN for concern of hematuria with clot formation. Patient with PMHx of Bladder CA/Prostate CA s/p Franco placement 2 days ago by urology for difficult placement.   VSS, Hgb stable.   Instructed RN to flush Franco to prevent clot formation, monitor for urine output. Repeat H/H and UA.   RN to continue to monitor, will alert provider with any change in patient status or if Franco is not draining with flushing. Called by RN for concern of hematuria with clot formation. Patient with PMHx of Bladder CA/Prostate CA s/p Franco placement 2 days ago by urology for difficult placement.   VSS, Hgb stable.   Instructed RN to flush Franco to prevent clot formation, monitor for urine output. Repeat H/H and UA.   RN to continue to monitor, will alert provider with any change in patient status or if Franco is not draining with flushing.    Addendum:  Seen and bedside, large clots visible with no Franco output. Unable to irrigate Franco secondary to resistance. Urology consulted.

## 2023-02-11 NOTE — PROGRESS NOTE ADULT - SUBJECTIVE AND OBJECTIVE BOX
[facility] Division of Hospital Medicine    Chief Complaint: obstructive VAHE     SUBJECTIVE: reports feeling ok, but on further conversation appears confused and delirius    OVERNIGHT EVENTS: none reported     Patient denies chest pain, SOB, abd pain, N/V, fever, chills, dysuria or any other complaints. All remainder ROS negative.     MEDICATIONS  (STANDING):  chlorhexidine 2% Cloths 1 Application(s) Topical <User Schedule>  dextrose 50% Injectable 25 Gram(s) IV Push once  dextrose 50% Injectable 12.5 Gram(s) IV Push once  dextrose 50% Injectable 25 Gram(s) IV Push once  dextrose Oral Gel 15 Gram(s) Oral once  glucagon  Injectable 1 milliGRAM(s) IntraMuscular once  heparin   Injectable 5000 Unit(s) SubCutaneous every 12 hours  piperacillin/tazobactam IVPB.. 3.375 Gram(s) IV Intermittent every 12 hours  sodium chloride 0.45%. 1000 milliLiter(s) (100 mL/Hr) IV Continuous <Continuous>    MEDICATIONS  (PRN):        I&O's Summary    10 Feb 2023 07:01  -  2023 07:00  --------------------------------------------------------  IN: 4425 mL / OUT: 6400 mL / NET: -1975 mL    2023 07:01  -  2023 13:54  --------------------------------------------------------  IN: 0 mL / OUT: 1040 mL / NET: -1040 mL        PHYSICAL EXAM:  Vital Signs Last 24 Hrs  T(C): 36.5 (2023 10:27), Max: 37.1 (2023 00:00)  T(F): 97.7 (2023 10:), Max: 98.8 (2023 00:00)  HR: 87 (2023 10:) (86 - 121)  BP: 167/84 (2023 10:27) (107/62 - 168/86)  BP(mean): 107 (2023 06:25) (78 - 125)  RR: 13 (2023 06:25) (10 - 22)  SpO2: 98% (2023 10:27) (98% - 100%)    Parameters below as of 2023 06:25  Patient On (Oxygen Delivery Method): room air            CONSTITUTIONAL: NAD  ENMT: Moist oral mucosa, no pharyngeal injection or exudates; normal dentition; No JVD  RESPIRATORY: Normal respiratory effort; lungs are clear to auscultation bilaterally  CARDIOVASCULAR: Regular rate and rhythm, normal S1 and S2, no murmur/rub/gallop; No lower extremity edema; Peripheral pulses are 2+ bilaterally  ABDOMEN: Nontender to palpation, normoactive bowel sounds, no rebound/guarding; No hepatosplenomegaly, Franco in place with gross hematuria and small cloths in bag  MUSCLOSKELETAL:  no clubbing or cyanosis of digits; no joint swelling or tenderness to palpation  PSYCH: A+O to person, but not to  place or time; confused  NEUROLOGY: follows commands intermittently, CN 2-12 are intact and symmetric; no gross sensory deficits; was observed moving all 4 ext against gravity cooperating with exam.   SKIN: No rashes; no palpable lesions    LABS:                        13.6   7.60  )-----------( 239      ( 2023 04:52 )             40.5     02-11    145  |  98  |  82.8<H>  ----------------------------<  176<H>  5.7<H>   |  33.0<H>  |  13.48<H>    Ca    9.2      2023 04:52  Phos  4.6     02-11  Mg     2.1     02-11    TPro  6.5<L>  /  Alb  3.7  /  TBili  0.4  /  DBili  x   /  AST  10  /  ALT  12  /  AlkPhos  71  02-10          Urinalysis Basic - ( 2023 19:36 )    Color: Yellow / Appearance: Clear / S.005 / pH: x  Gluc: x / Ketone: Trace  / Bili: Negative / Urobili: Negative mg/dL   Blood: x / Protein: 100 / Nitrite: Negative   Leuk Esterase: Moderate / RBC: 25-50 /HPF / WBC 11-25 /HPF   Sq Epi: x / Non Sq Epi: Occasional / Bacteria: Occasional        Culture - Urine (collected 2023 19:36)  Source: Clean Catch Clean Catch (Midstream)  Final Report (2023 07:41):    No growth      CAPILLARY BLOOD GLUCOSE      POCT Blood Glucose.: 92 mg/dL (2023 13:32)  POCT Blood Glucose.: 137 mg/dL (2023 09:24)  POCT Blood Glucose.: 242 mg/dL (2023 05:12)  POCT Blood Glucose.: 171 mg/dL (10 Feb 2023 22:16)        RADIOLOGY & ADDITIONAL TESTS:  Results Reviewed:   Imaging Personally Reviewed:  Electrocardiogram Personally Reviewed:

## 2023-02-11 NOTE — BRIEF OPERATIVE NOTE - NSICDXBRIEFPOSTOP_GEN_ALL_CORE_FT
POST-OP DIAGNOSIS:  Clot retention of urine 11-Feb-2023 20:28:35  Amadou Noel  Radiation cystitis 11-Feb-2023 20:28:49  Amadou Noel  Prostate cancer 11-Feb-2023 20:28:57  Amadou Noel

## 2023-02-12 NOTE — PROGRESS NOTE PEDS - ASSESSMENT
-The patient is a 77 year old male with PMH of HTN, HLD, CKD 3b (baseline creatinine ~1.9mg/dL in August 2022), hx of Prostate Ca s/p chemoradiation +/- ? prostatectomy, active Bladder Ca s/p resection x 2 and immunotherapy (last treatment was in August 2022) presents with weakness, anorexia, anuria and altered mental status. Labs showed K 8.5, , Cr 31.5. Nephrology was consulted for VAHE on CKD, hyperkalemia and evaluation for hemodialysis. Unable to obtain history from patient due to AMS. History was obtained from daughter (Yris Thakur) whom was present at bedside.    -VAHE (on CKD) is secondary to obstructive uropathy   -Baseline creatinine is 1.9mg/dL (in August 2022)  -On admission, creatinine is 31.5mg/dL, K+ 8.5  -Bedside ultrasound showed distended bladder, s/p cobb  placement  - Pt with hematuria, s/p OR on 02/11 for cystoscopy and bladder clot evacuation over night - now on CBI  - BUN/ Scr trending down; K+ improved; d/c Lokelma  - Unable to quantify UOP as pt is on CBI  - Serum sodium trending up- switch fluid to D5W

## 2023-02-12 NOTE — DIETITIAN INITIAL EVALUATION ADULT - ADD RECOMMEND
Add Ensure BID; Rx MVI daily; Encourage HBV protein sources; Provide encouragement/assistance as needed during mealtimes to inc PO

## 2023-02-12 NOTE — PROGRESS NOTE PEDS - SUBJECTIVE AND OBJECTIVE BOX
[facility] DIVISION OF KIDNEY DISEASES AND HYPERTENSION -- FOLLOW UP NOTE  --------------------------------------------------------------------------------  Chief Complaint: Crescencio    24 hour events/subjective:  s/p OR for cystoscopy/ bladder clot evacuation yesterday  Pt seen and examined; confused        PAST HISTORY  --------------------------------------------------------------------------------  No significant changes to PMH, PSH, FHx, SHx, unless otherwise noted    ALLERGIES & MEDICATIONS  --------------------------------------------------------------------------------  Allergies    No Known Drug Allergies  shellfish (Anaphylaxis)    Intolerances      Standing Inpatient Medications  chlorhexidine 2% Cloths 1 Application(s) Topical <User Schedule>  dextrose 50% Injectable 25 Gram(s) IV Push once  dextrose 50% Injectable 12.5 Gram(s) IV Push once  dextrose 50% Injectable 25 Gram(s) IV Push once  dextrose Oral Gel 15 Gram(s) Oral once  glucagon  Injectable 1 milliGRAM(s) IntraMuscular once  heparin   Injectable 5000 Unit(s) SubCutaneous every 12 hours  piperacillin/tazobactam IVPB.. 3.375 Gram(s) IV Intermittent every 12 hours  QUEtiapine 12.5 milliGRAM(s) Oral two times a day  sodium chloride 0.45%. 1000 milliLiter(s) IV Continuous <Continuous>  sodium zirconium cyclosilicate 10 Gram(s) Oral daily    PRN Inpatient Medications      REVIEW OF SYSTEMS  limited by confusion    VITALS/PHYSICAL EXAM  --------------------------------------------------------------------------------  T(C): 36.8 (02-12-23 @ 12:00), Max: 36.8 (02-12-23 @ 12:00)  HR: 103 (02-12-23 @ 12:00) (70 - 103)  BP: 156/90 (02-12-23 @ 12:00) (156/75 - 176/98)  RR: 15 (02-12-23 @ 12:00) (12 - 20)  SpO2: 98% (02-12-23 @ 04:24) (98% - 100%)  Wt(kg): --        02-11-23 @ 07:01  -  02-12-23 @ 07:00  --------------------------------------------------------  IN: 3550 mL / OUT: 05859 mL / NET: -04793 mL      Physical Exam:  	Gen: NAD  	HEENT:  supple neck  	Pulm: CTA B/L  	CV: RRR, S1S2; no rub  	Back: No spinal or CVA tenderness; no sacral edema  	Abd: +BS, soft, nontender/nondistended  	: reza+  	UE: Warm, no edema  	LE: Warm,  no edema  	Neuro: No focal deficit  	Skin: Warm    LABS/STUDIES  --------------------------------------------------------------------------------              13.9   x     >-----------<  x        [02-11-23 @ 15:40]              42.6     149  |  108  |  53.7  ----------------------------<  129      [02-12-23 @ 07:32]  5.1   |  25.0  |  7.76        Ca     8.7     [02-12-23 @ 07:32]      Mg     2.1     [02-11-23 @ 04:52]      Phos  4.6     [02-11-23 @ 04:52]        Creatinine Trend:  SCr 7.76 [02-12 @ 07:32]  SCr 13.48 [02-11 @ 04:52]  SCr 16.03 [02-10 @ 17:17]  SCr 19.22 [02-10 @ 11:26]  SCr 26.30 [02-10 @ 06:23]    Urinalysis - [02-09-23 @ 19:36]      Color Yellow / Appearance Clear / SG 1.005 / pH 7.0      Gluc 50 / Ketone Trace  / Bili Negative / Urobili Negative       Blood Large / Protein 100 / Leuk Est Moderate / Nitrite Negative      RBC 25-50 / WBC 11-25 / Hyaline  / Gran  / Sq Epi  / Non Sq Epi Occasional / Bacteria Occasional      TSH 2.31      [02-09-23 @ 14:15]    HBsAb <3.0      [02-09-23 @ 19:36]  HBsAb Nonreact      [02-09-23 @ 19:36]  HBsAg Nonreact      [02-09-23 @ 19:36]  HBcAb Nonreact      [02-09-23 @ 19:36]  HCV 0.09, Nonreact      [02-09-23 @ 19:36]

## 2023-02-12 NOTE — DIETITIAN INITIAL EVALUATION ADULT - NS FNS DIET ORDER
Diet, Regular:   Soft and Bite Sized (SOFTBTSZ) (02-12-23 @ 07:34)  Diet, NPO:   NPO for Procedure/Test     NPO Start Date: 11-Feb-2023,   NPO Start Time: 17:00 (02-11-23 @ 17:04)

## 2023-02-12 NOTE — DIETITIAN NUTRITION RISK NOTIFICATION - TREATMENT: THE FOLLOWING DIET HAS BEEN RECOMMENDED
Diet, Regular:   Soft and Bite Sized (SOFTBTSZ) (02-12-23 @ 07:34) [Active]  Diet, NPO:   NPO for Procedure/Test     NPO Start Date: 11-Feb-2023,   NPO Start Time: 17:00 (02-11-23 @ 17:04) [Active]

## 2023-02-12 NOTE — DIETITIAN NUTRITION RISK NOTIFICATION - ADDITIONAL COMMENTS/DIETITIAN RECOMMENDATIONS
1) Add Ensure BID  2) Rx MVI daily  3) Encourage HBV protein sources  4) Provide encouragement/assistance as needed during mealtimes to inc PO   5) Monitor weights daily for trend/accuracy

## 2023-02-12 NOTE — PROGRESS NOTE ADULT - SUBJECTIVE AND OBJECTIVE BOX
POD# 1 s/p cystoscopy with urethral dilatation and evacuation clot (bladder) placement cobb - 20F 3-way in place     patient seen and examined at bedside   awake, responsive resting comfortable- feeling much better ( less discomfort supra-pubic region    Vital Signs Last 24 Hrs    T(C): 36.6 (12 Feb 2023 04:24), Max: 36.7 (11 Feb 2023 17:00)  T(F): 97.8 (12 Feb 2023 04:24), Max: 98 (11 Feb 2023 17:00)  HR: 97 (12 Feb 2023 04:24) (70 - 98)  BP: 167/89 (12 Feb 2023 04:24) (156/75 - 176/98)  BP(mean): 94 (11 Feb 2023 22:00) (94 - 129)  RR: 18 (12 Feb 2023 04:24) (12 - 20)  SpO2: 98% (12 Feb 2023 04:24) (98% - 100%)    abdomen: soft, n/d, much less supra-pubic discomfort on palpation  :  cobb catheter in place CBI in progress- noted urine clear, ( clamp CBI off- follow routinely this am )       Impression:  stable post op , tolerated procedure well  Plan:  continue present care and management with cobb to BSD ( CBI off this am will follow output.  continue general care and management as per medicine.

## 2023-02-12 NOTE — DIETITIAN INITIAL EVALUATION ADULT - OTHER INFO
76 yo male, PMHx HTN, HLD, CVA 2019, CKD, bladder CA dx 2020 s/p intravesicular resection and Gemcitabine treatment, prostate cancer s/p radiation 2012, who presented from home with failure to thrive. History obtained from patient's daughter at bedside, who states patient was refusing to get out of bed or to eat and drink over the past several days. Additionally, he is now confused. Denies fever or chills, recent cough, vomiting, diarrhea, complaints of chest or abdominal pain. She states she brought both of her parents to come live with her family in her home, however has noticed a decline in her father and a change in his behavior over the past year. She describes shuffling gait and that he has become isolated in his room, does not engage with the family or participate in family meals, has largely stopped eating and drinking, and stopped taking his medications. Additionally, patient is fluent in English, [place] (he is native to [facility]), and [place], however his wife has noticed episodic confusion/forgetting how to speak in Scottish. He has also been more agitated and combative. There is a family history of dementia, his mother suffered from Alzheimer's, as well as multiple strokes and coronary artery disease. Indwelling cobb was unable to be passed and CT abd/pelv revealed markedly distended bladder and a large soft tissue mass inseparable from posterior bladder and prostate gland. pt s/p cystoscopy w/urethral dilation and evacuation of clot (2/11)

## 2023-02-12 NOTE — PROGRESS NOTE ADULT - ASSESSMENT
77M with PMHX HTN, HLD, CVA, CKD, Bladder CA s/p intravesicular resection/chemo with Gemcitabine, Prostate CA s/p Radiation c/o AMS, weakness/lethargy/FTT admitted to MICU for Severe Hyperkalemia, ARF on CKD now ATN 2/2 Obstructive Uropathy, Metabolic Acidosis and Acute UTI. Franco was placed pt was started on ivf and lokelma with improved kidney function, Nephrology team follows.       #Sever obstructive ARF from hematuria from obstructive BPH in pt with hx of prostate cancer  #Hyperkalemia  # Hypernatremia   - bmp noted  - ivf adjusted to 1/2 NSS @ 125 cc/hr  - c/w lokelma 10 mg daily  - pt went to OR for cystoscopy and CBI placement over night on 02/11>> hematuria has improved>> CBI is clumped, urology follow  -Nephro consult noted    Acute UTI, was ruled out with negative cx  - stopped abx, will observe off abx      AMS due to uremia and hospital acquired delirium  - managemant as above  - started seroquel 12.5 mg bid  -Maintain Restricts for safety as patient reportedly attempted to remove Franco    Dysphagia, FTT, Debility  -resumed diet given pt did well with bedside screening     Bladder CA, Prostate CA  -CT abd/pelv revealed markedly distended bladder and a large soft tissue mass inseparable from posterior bladder and prostate gland  -s/p surgical intervention, radiation, chemo with Gemcitabine  -Maintain Franco Cath    DVT ppx  - lovenox  Code/social  - DNR/DNI/Do not want HD per patient and family wishes  Dispo -Likely plan for [facility] with hospice vs Home with hospice   77M with PMHX HTN, HLD, CVA, CKD, Bladder CA s/p intravesicular resection/chemo with Gemcitabine, Prostate CA s/p Radiation c/o AMS, weakness/lethargy/FTT admitted to MICU for Severe Hyperkalemia, ARF on CKD now ATN 2/2 Obstructive Uropathy, Metabolic Acidosis and Acute UTI. Franco was placed pt was started on ivf and lokelma with improved kidney function, Nephrology team follows.       #Sever obstructive ARF from hematuria from obstructive BPH in pt with hx of prostate cancer  #Hyperkalemia  # Hypernatremia   - bmp noted  - ivf adjusted to 1/2 NSS @ 125 cc/hr  - c/w lokelma 10 mg daily  - pt went to OR for cystoscopy and CBI placement over night on 02/11>> hematuria has improved>> CBI is clumped, urology follow  -Nephro consult noted    Acute UTI, was ruled out with negative cx  - stopped abx, will observe off abx      AMS due to uremia and hospital acquired delirium  - managemant as above  - started seroquel 12.5 mg bid  -Maintain Restricts for safety as patient reportedly attempted to remove Franco    Dysphagia, FTT, Debility  -resumed diet given pt did well with bedside screening     Bladder CA, Prostate CA  -CT abd/pelv revealed markedly distended bladder and a large soft tissue mass inseparable from posterior bladder and prostate gland  -s/p surgical intervention, radiation, chemo with Gemcitabine  -Maintain Franco Cath    DVT ppx  - lovenox  Code/social  - DNR/DNI/Do not want HD per patient and family wishes, updated daughter today over phone, spent 15 min, answered all questions  Dispo -Likely plan for [facility] with hospice vs Home with hospice

## 2023-02-12 NOTE — DIETITIAN INITIAL EVALUATION ADULT - NSICDXPASTMEDICALHX_GEN_ALL_CORE_FT
PAST MEDICAL HISTORY:  Chronic kidney disease (CKD) stage 3    CVA (cerebrovascular accident)     Dementia     Glaucoma     Gross hematuria     H/O cataract     HLD (hyperlipidemia)     Elem (hard of hearing)     HTN (hypertension)     Prostate cancer     Syncope several episodes origin [facility]

## 2023-02-12 NOTE — DIETITIAN INITIAL EVALUATION ADULT - PERTINENT LABORATORY DATA
02-12    149<H>  |  108  |  53.7<H>  ----------------------------<  129<H>  5.1   |  25.0  |  7.76<H>    Ca    8.7      12 Feb 2023 07:32  Phos  4.6     02-11  Mg     2.1     02-11    POCT Blood Glucose.: 124 mg/dL (02-11-23 @ 16:59)

## 2023-02-12 NOTE — DIETITIAN INITIAL EVALUATION ADULT - ETIOLOGY
related to inability to meet sufficient protein-energy needs in setting of urinary obstruction w/cystitis, advanced age, dysphagia

## 2023-02-12 NOTE — DIETITIAN INITIAL EVALUATION ADULT - PERTINENT MEDS FT
MEDICATIONS  (STANDING):  chlorhexidine 2% Cloths 1 Application(s) Topical <User Schedule>  dextrose 50% Injectable 25 Gram(s) IV Push once  dextrose 50% Injectable 12.5 Gram(s) IV Push once  dextrose 50% Injectable 25 Gram(s) IV Push once  dextrose Oral Gel 15 Gram(s) Oral once  glucagon  Injectable 1 milliGRAM(s) IntraMuscular once  heparin   Injectable 5000 Unit(s) SubCutaneous every 12 hours  piperacillin/tazobactam IVPB.. 3.375 Gram(s) IV Intermittent every 12 hours  QUEtiapine 12.5 milliGRAM(s) Oral two times a day  sodium chloride 0.45%. 1000 milliLiter(s) (125 mL/Hr) IV Continuous <Continuous>  sodium zirconium cyclosilicate 10 Gram(s) Oral daily    MEDICATIONS  (PRN):

## 2023-02-12 NOTE — PROGRESS NOTE ADULT - SUBJECTIVE AND OBJECTIVE BOX
[facility] Division of Hospital Medicine    Chief Complaint:  hematuria    SUBJECTIVE: doesn't verbalized complains, appears pleasantly confused     OVERNIGHT EVENTS: went to OR for cystoscopy and bladder cloth evacuation over night     ROS is not available due to pt's mentation    MEDICATIONS  (STANDING):  chlorhexidine 2% Cloths 1 Application(s) Topical <User Schedule>  dextrose 50% Injectable 25 Gram(s) IV Push once  dextrose 50% Injectable 12.5 Gram(s) IV Push once  dextrose 50% Injectable 25 Gram(s) IV Push once  dextrose Oral Gel 15 Gram(s) Oral once  glucagon  Injectable 1 milliGRAM(s) IntraMuscular once  heparin   Injectable 5000 Unit(s) SubCutaneous every 12 hours  piperacillin/tazobactam IVPB.. 3.375 Gram(s) IV Intermittent every 12 hours  QUEtiapine 12.5 milliGRAM(s) Oral two times a day  sodium chloride 0.45%. 1000 milliLiter(s) (100 mL/Hr) IV Continuous <Continuous>  sodium zirconium cyclosilicate 10 Gram(s) Oral daily    MEDICATIONS  (PRN):        I&O's Summary    11 Feb 2023 07:01  -  12 Feb 2023 07:00  --------------------------------------------------------  IN: 3550 mL / OUT: 84213 mL / NET: -98226 mL        PHYSICAL EXAM:  Vital Signs Last 24 Hrs  T(C): 36.8 (12 Feb 2023 12:00), Max: 36.8 (12 Feb 2023 12:00)  T(F): 98.3 (12 Feb 2023 12:00), Max: 98.3 (12 Feb 2023 12:00)  HR: 103 (12 Feb 2023 12:00) (70 - 103)  BP: 156/90 (12 Feb 2023 12:00) (156/75 - 176/98)  BP(mean): 94 (11 Feb 2023 22:00) (94 - 129)  RR: 15 (12 Feb 2023 12:00) (12 - 20)  SpO2: 98% (12 Feb 2023 04:24) (98% - 100%)    Parameters below as of 11 Feb 2023 22:42  Patient On (Oxygen Delivery Method): room air      CONSTITUTIONAL: NAD  ENMT: Moist oral mucosa, no pharyngeal injection or exudates; normal dentition; No JVD  RESPIRATORY: Normal respiratory effort; lungs are clear to auscultation bilaterally  CARDIOVASCULAR: Regular rate and rhythm, normal S1 and S2, no murmur/rub/gallop; No lower extremity edema; Peripheral pulses are 2+ bilaterally  ABDOMEN: Nontender to palpation, normoactive bowel sounds, no rebound/guarding; No hepatosplenomegaly, Franco in place with somewhat pink urine in Franco, w/o cloths  MUSCLOSKELETAL:  no clubbing or cyanosis of digits; no joint swelling or tenderness to palpation  PSYCH: A+O to person, but not to  place or time; confused  NEUROLOGY: follows commands intermittently, CN 2-12 are intact and symmetric; no gross sensory deficits; was observed moving all 4 ext against gravity cooperating with exam.   SKIN: No rashes; no palpable lesions        LABS:                        13.9   x     )-----------( x        ( 11 Feb 2023 15:40 )             42.6     02-12    149<H>  |  108  |  53.7<H>  ----------------------------<  129<H>  5.1   |  25.0  |  7.76<H>    Ca    8.7      12 Feb 2023 07:32  Phos  4.6     02-11  Mg     2.1     02-11                Culture - Blood (collected 10 Feb 2023 11:37)  Source: .Blood Blood-Peripheral  Preliminary Report (11 Feb 2023 17:02):    No growth to date.    Culture - Blood (collected 10 Feb 2023 11:26)  Source: .Blood Blood-Peripheral  Preliminary Report (11 Feb 2023 17:02):    No growth to date.    Culture - Urine (collected 09 Feb 2023 19:36)  Source: Clean Catch Clean Catch (Midstream)  Final Report (11 Feb 2023 07:41):    No growth      CAPILLARY BLOOD GLUCOSE      POCT Blood Glucose.: 124 mg/dL (11 Feb 2023 16:59)  POCT Blood Glucose.: 92 mg/dL (11 Feb 2023 13:32)        RADIOLOGY & ADDITIONAL TESTS:  Results Reviewed:   Imaging Personally Reviewed:  Electrocardiogram Personally Reviewed:

## 2023-02-13 NOTE — PROGRESS NOTE ADULT - SUBJECTIVE AND OBJECTIVE BOX
OVERNIGHT EVENTS: s/p OR cytoscopy / bladder clot evacuation     Present Symptoms:     Dyspnea: none   Nausea/Vomiting: No  Anxiety:  No  Depression: No  Fatigue: No  Loss of appetite: No  Constipation: none     Pain: none currently             Character-            Duration-            Effect-            Factors-            Frequency-            Location-            Severity-    Pain AD Score:  http://geriatrictoolkit.[email or web address]/painad.pdf (press ctrl + left click to view)    Review of Systems: Reviewed                     Negative: no chest pain                    All others negative    MEDICATIONS  (STANDING):  chlorhexidine 2% Cloths 1 Application(s) Topical <User Schedule>  dextrose 5%. 1000 milliLiter(s) (100 mL/Hr) IV Continuous <Continuous>  dextrose 50% Injectable 25 Gram(s) IV Push once  dextrose 50% Injectable 12.5 Gram(s) IV Push once  dextrose 50% Injectable 25 Gram(s) IV Push once  dextrose Oral Gel 15 Gram(s) Oral once  glucagon  Injectable 1 milliGRAM(s) IntraMuscular once  heparin   Injectable 5000 Unit(s) SubCutaneous every 12 hours  piperacillin/tazobactam IVPB.. 3.375 Gram(s) IV Intermittent every 12 hours  QUEtiapine 25 milliGRAM(s) Oral two times a day    MEDICATIONS  (PRN):      PHYSICAL EXAM:    Vital Signs Last 24 Hrs  T(C): 36.3 (13 Feb 2023 04:38), Max: 36.9 (12 Feb 2023 17:00)  T(F): 97.3 (13 Feb 2023 04:38), Max: 98.4 (12 Feb 2023 17:00)  HR: 98 (13 Feb 2023 04:38) (94 - 103)  BP: 169/87 (13 Feb 2023 04:38) (156/90 - 169/87)  BP(mean): --  RR: 18 (13 Feb 2023 04:38) (15 - 18)  SpO2: 95% (13 Feb 2023 04:38) (95% - 95%)    Parameters below as of 12 Feb 2023 17:00  Patient On (Oxygen Delivery Method): room air    General: alert, in no acute distress    HEENT: normal      Lungs: comfortable    CV: normal      GI: normal      : cobb - draining clear urine     MSK: weakness      Skin: no rash    LABS:                      11.9   9.37  )-----------( 206      ( 13 Feb 2023 06:30 )             38.3     02-13    145  |  110<H>  |  37.7<H>  ----------------------------<  93  4.1   |  24.0  |  5.18<H>    Ca    8.5      13 Feb 2023 06:30  Phos  2.7     02-13  Mg     1.7     02-13    I&O's Summary    12 Feb 2023 07:01  -  13 Feb 2023 07:00  --------------------------------------------------------  IN: 0 mL / OUT: 900 mL / NET: -900 mL    RADIOLOGY & ADDITIONAL STUDIES:    < from: CT Head No Cont (02.09.23 @ 19:18) >  IMPRESSION: No significant change when allowing for differences in   technique.    --- End of Report ---    < end of copied text >    Culture - Blood (02.10.23 @ 11:37)    Specimen Source: .Blood Blood-Peripheral    Culture Results:   No growth to date.    Culture - Blood (02.10.23 @ 11:26)    Specimen Source: .Blood Blood-Peripheral    Culture Results:   No growth to date.    Culture - Urine (02.09.23 @ 19:36)    Specimen Source: Clean Catch Clean Catch (Midstream)    Culture Results:   No growth      ADVANCE DIRECTIVES/TREATMENT PREFERENCES:  do not resuscitate and do not intubate

## 2023-02-13 NOTE — PROGRESS NOTE ADULT - SUBJECTIVE AND OBJECTIVE BOX
[facility] Division of Hospital Medicine    Chief Complaint:  hematuria    SUBJECTIVE: doesn't verbalized complains, appears pleasantly confused, somewhat more coherent today    OVERNIGHT EVENTS: none reported     ROS is not available due to pt's mentation    MEDICATIONS  (STANDING):  amLODIPine   Tablet 2.5 milliGRAM(s) Oral daily  chlorhexidine 2% Cloths 1 Application(s) Topical <User Schedule>  dextrose 5%. 1000 milliLiter(s) (50 mL/Hr) IV Continuous <Continuous>  dextrose 50% Injectable 25 Gram(s) IV Push once  dextrose 50% Injectable 12.5 Gram(s) IV Push once  dextrose 50% Injectable 25 Gram(s) IV Push once  dextrose Oral Gel 15 Gram(s) Oral once  glucagon  Injectable 1 milliGRAM(s) IntraMuscular once  heparin   Injectable 5000 Unit(s) SubCutaneous every 12 hours  piperacillin/tazobactam IVPB.. 3.375 Gram(s) IV Intermittent every 12 hours  QUEtiapine 25 milliGRAM(s) Oral two times a day    MEDICATIONS  (PRN):      I&O's Summary    11 Feb 2023 07:01  -  12 Feb 2023 07:00  --------------------------------------------------------  IN: 3550 mL / OUT: 84803 mL / NET: -34701 mL        PHYSICAL EXAM:  Vital Signs Last 24 Hrs  T(C): 36.7 (13 Feb 2023 11:44), Max: 36.9 (12 Feb 2023 17:00)  T(F): 98.1 (13 Feb 2023 11:44), Max: 98.4 (12 Feb 2023 17:00)  HR: 97 (13 Feb 2023 11:44) (94 - 98)  BP: 158/75 (13 Feb 2023 11:44) (158/75 - 169/87)  BP(mean): --  RR: 18 (13 Feb 2023 11:44) (18 - 18)  SpO2: 98% (13 Feb 2023 11:44) (95% - 98%)    Parameters below as of 13 Feb 2023 11:44  Patient On (Oxygen Delivery Method): room air          CONSTITUTIONAL: NAD  ENMT: Moist oral mucosa, no pharyngeal injection or exudates; normal dentition; No JVD  RESPIRATORY: Normal respiratory effort; lungs are clear to auscultation bilaterally  CARDIOVASCULAR: Regular rate and rhythm, normal S1 and S2, no murmur/rub/gallop; No lower extremity edema; Peripheral pulses are 2+ bilaterally  ABDOMEN: Nontender to palpation, normoactive bowel sounds, no rebound/guarding; No hepatosplenomegaly, Franco in place with clear yellow urine in Franco, w/o cloths  MUSCLOSKELETAL:  no clubbing or cyanosis of digits; no joint swelling or tenderness to palpation  PSYCH: A+O to person, but not to  place or time; confused  NEUROLOGY: follows commands intermittently, CN 2-12 are intact and symmetric; no gross sensory deficits; was observed moving all 4 ext against gravity cooperating with exam.   SKIN: No rashes; no palpable lesions        LABS:                        11.9   9.37  )-----------( 206      ( 13 Feb 2023 06:30 )             38.3     02-13    145  |  110<H>  |  37.7<H>  ----------------------------<  93  4.1   |  24.0  |  5.18<H>    Ca    8.5      13 Feb 2023 06:30  Phos  2.7     02-13  Mg     1.7     02-13                CAPILLARY BLOOD GLUCOSE            RADIOLOGY & ADDITIONAL TESTS:  Results Reviewed:   Imaging Personally Reviewed:  Electrocardiogram Personally Reviewed:

## 2023-02-13 NOTE — PROGRESS NOTE ADULT - ASSESSMENT
The patient is a 77 year old male with PMH of HTN, HLD, CKD 3b (baseline creatinine ~1.9mg/dL in August 2022), hx of prostate Ca s/p chemoradiation +/- ? prostatectomy, active bladder Ca s/p resection x 2 and immunotherapy (last treatment was in August 2022) presents with weakness, anorexia, anuria and altered mental status. Labs showed K 8.5, , Cr 31.5. Admitted for obstructive uropathy s/p cobb placement, then cystoscopy and urethral dilatation and placement 20F 3-way cobb on 02/11/23. Nephrology was consulted for VAHE (mproving) on CKD + electrolyte derangement (resolved) and acid base disturbance (resolved).     -VAHE (on CKD) is secondary to obstructive uropathy   -Baseline creatinine is 1.9mg/dL (in August 2022)  -On admission, creatinine is 31.5mg/dL  -Bedside ultrasound showed distended bladder   -s/p cobb placement in ED, then later with cystoscopy and urethral dilatation and placement 20F 3-way cobb on 02/11/23  -Latest creatinine improved to 5.1mg/dL  -Hemodynamics - BP suboptimal - unable to restart ARB due to VAHE (on CKD) - will start amlodipine 2.5mg PO daily instead  -Metabolic acidosis in setting of CKD - serum bicarb is accepable  -Anemia in setting of CKD - hemoglobin is at goal   -Mineral Bone Disease in setting of CKD - calcium and phos are okay; will check Vitamin D and PTH   -Hypernatremia, resolved - will lower D5W from 100cc/hr to 50cc/hr to account for insensible water loss as PO intake is poor    Bubba Silverman DO  Nephrology  [facility]  Office Number [phone]

## 2023-02-13 NOTE — PROGRESS NOTE ADULT - SUBJECTIVE AND OBJECTIVE BOX
Confused with wrist restraints.     Vital Signs Last 24 Hrs  T(C): 36.3 (13 Feb 2023 04:38), Max: 36.9 (12 Feb 2023 17:00)  T(F): 97.3 (13 Feb 2023 04:38), Max: 98.4 (12 Feb 2023 17:00)  HR: 98 (13 Feb 2023 04:38) (94 - 103)  BP: 169/87 (13 Feb 2023 04:38) (156/90 - 169/87)  BP(mean): --  RR: 18 (13 Feb 2023 04:38) (15 - 18)  SpO2: 95% (13 Feb 2023 04:38) (95% - 95%)    Parameters below as of 12 Feb 2023 17:00  Patient On (Oxygen Delivery Method): room air    I&O's Summary    12 Feb 2023 07:01  -  13 Feb 2023 07:00  --------------------------------------------------------  IN: 0 mL / OUT: 900 mL / NET: -900 mL    Physical Exam  General: Frail elderly male in NAD, room air, speaking in full sentences  Cardiac: S1S2 RRR  Respiratory: CTAB  Abdomen: Soft, NT  Extremities: No appreciable edema; +wrist restraints   : +Salvador   Neuro: AAOx2 (oriented to person and place, not to time)    02-13    145  |  110<H>  |  37.7<H>  ----------------------------<  93  4.1   |  24.0  |  5.18<H>    Ca    8.5      13 Feb 2023 06:30  Phos  2.7     02-13  Mg     1.7     02-13                        11.9   9.37  )-----------( 206      ( 13 Feb 2023 06:30 )             38.3     MEDICATIONS  (STANDING):  chlorhexidine 2% Cloths 1 Application(s) Topical <User Schedule>  dextrose 5%. 1000 milliLiter(s) (100 mL/Hr) IV Continuous <Continuous>  dextrose 50% Injectable 25 Gram(s) IV Push once  dextrose 50% Injectable 12.5 Gram(s) IV Push once  dextrose 50% Injectable 25 Gram(s) IV Push once  dextrose Oral Gel 15 Gram(s) Oral once  glucagon  Injectable 1 milliGRAM(s) IntraMuscular once  heparin   Injectable 5000 Unit(s) SubCutaneous every 12 hours  piperacillin/tazobactam IVPB.. 3.375 Gram(s) IV Intermittent every 12 hours  QUEtiapine 25 milliGRAM(s) Oral two times a day    MEDICATIONS  (PRN):

## 2023-02-13 NOTE — CHART NOTE - NSCHARTNOTEFT_GEN_A_CORE
I have evaluated this patient and have determined that restraints are warranted to optimize medical care. Patient was assessed for current physical and psychological risk factors as well as special needs. There are no medical conditions or limitations that would place this patient at risk while in restraints. Dr. TATUM Meyer made aware.

## 2023-02-13 NOTE — PROGRESS NOTE ADULT - NS ATTEND AMEND GEN_ALL_CORE FT
Urine clear yellow.   would discharge with CBI port clamped.     Follow up with Dr. Clayton.     ER
Continue to monitor urine color  wean CBI slowlly.
Given the markedly elevated creatinine and previous difficulty placing a catheter I feel this needs to be performed emergently under anesthesia. This was explained to the daughter.    To OR for emergent cath placement and dilatation if needed.

## 2023-02-13 NOTE — PROGRESS NOTE ADULT - SUBJECTIVE AND OBJECTIVE BOX
Urology F/U:    Seen and examined with Dr. Noel  above notes noted    POD#2 cysto, evacuation of clots, with urethral dilatation and placement  20F 3-way cobb with CBI in progress  Patient seen and examined at bedside this am  Awake, responsive comfortable in no acute distress, more comfortable     Vital Signs Last 24 Hrs    T(C): 36.3 (13 Feb 2023 04:38), Max: 36.9 (12 Feb 2023 17:00)  T(F): 97.3 (13 Feb 2023 04:38), Max: 98.4 (12 Feb 2023 17:00)  HR: 98 (13 Feb 2023 04:38) (94 - 103)  BP: 169/87 (13 Feb 2023 04:38) (156/90 - 169/87)  RR: 18 (13 Feb 2023 04:38) (15 - 18)  SpO2: 95% (13 Feb 2023 04:38) (95% - 95%)    Abdomen: soft, n/d, n/t on palpation  :  cobb in place CBI on slow drip urine yellow/ clear    Labs:                         11.9   9.37  )-----------( 206      ( 13 Feb 2023 06:30 )             38.3     02-12    149<H>  |  108  | BUN:  53.7<H>  ----------------------------<  129<H>  5.1   |  25.0  | CREAT.:  7.76<H>      Impression:  patient tolerated procedure well, improved , 20F 3-way cobb in place will discontinue CBI and follow urine color.   Plan:  Discontinue CBI, follow urine and if remains yellow, clear may be discharged from urological stand-point and patient should go home with cobb catheter and follow up in office as outpatient . Disposition as per medicine.  Thank you.

## 2023-02-13 NOTE — PROGRESS NOTE ADULT - ASSESSMENT
77M with PMHX HTN, HLD, CVA, CKD, Bladder CA s/p intravesicular resection/chemo with Gemcitabine, Prostate CA s/p Radiation c/o AMS, weakness/lethargy/FTT admitted to MICU for Severe Hyperkalemia, ARF on CKD now ATN 2/2 Obstructive Uropathy, Metabolic Acidosis and Acute UTI. Franco was placed pt was started on ivf and lokelma with improved kidney function, Nephrology team follows. UTI was ruled out with negative urine cx, hence Abx were stopped after 3 days. Hospital stay c/b worsening of hematuria, hence pt went to OR for cystoscopy and CBI was placed on 02/11. CBI clumped on 02/12, since hematuria has resolved and BUN/Cr has been trending down with Franco in place and ivf. Pt also noted to be in hyperactive delirium and pulling on lines and Franco, hence was started on seroquel.       #Sever obstructive ARF from hematuria from obstructive BPH in pt with hx of prostate cancer  #Hyperkalemia  # Hypernatremia   - bmp noted  - ivf adjusted to  d5w @ 50 cc/hr  - lokelma stopped  - CBI is clumped and will remains in as Franco cath, placed on 02/11  -Nephro consult noted    Acute UTI, was ruled out with negative cx  - stopped abx, will observe off abx      AMS due to uremia and hospital acquired delirium  - managemant as above  - increased seroquel 25 mg bid  -Maintain Restricts for safety as patient reportedly attempted to remove Franco    Dysphagia, FTT, Debility  -resumed diet given pt did well with bedside screening     Bladder CA, Prostate CA  -CT abd/pelv revealed markedly distended bladder and a large soft tissue mass inseparable from posterior bladder and prostate gland  -s/p surgical intervention, radiation, chemo with Gemcitabine  -Maintain Franco Cath    DVT ppx  - lovenox  Code/social  - DNR/DNI/Do not want HD per patient and family wishes, updated daughter today over phone, spent 15 min, answered all questions  Dispo - plan for [facility] once pt is off restrains

## 2023-02-13 NOTE — PROGRESS NOTE ADULT - ASSESSMENT
77M with HTN, HLD, CKD, ischemic CVA, ? dementia, bladder cancer/prostate cancer admitted with failure to thrive, metabolic derangements with Acute Kidney Injury on CKD, severe hyperkalemia obstructive uropathy, concerns for UTI.     #1 Acute kidney injury  - supportive measures  - trend creatinine   - avoid nephrotoxins   - family does not want hemodialysis   - improving   - s/o cystoscopy with evacuation of clots 2/11, cobb to remain in      #2 Encephalopathy, concerns for dementia with history of CVA  - patient has been completely dependent with all activities of daily living more recently  - wife has been doing most of his activities of daily living like bathing etc for 2 years  - reorientation     #3 Bladder Cancer, prostate cancer   - had 2 operations first in 2015 and reoccurrence leading to last one in october of 2021     #4 Debility, cachexia   - assist with all activities of daily living  - prealbumin 13    #5 Encounter for palliative care  - plans for [facility]  - education already provided to family regarding hospice at home services, unclear if they can manage him at home so plans for [facility] first.   - do not resuscitate and do not intubate in place

## 2023-02-14 NOTE — PROGRESS NOTE ADULT - SUBJECTIVE AND OBJECTIVE BOX
[facility] DIVISION OF KIDNEY DISEASES AND HYPERTENSION -- FOLLOW UP NOTE  --------------------------------------------------------------------------------  Chief Complaint: esmer    24 hour events/subjective:  no acute event noted  pt is confused/ on wrist restraints        PAST HISTORY  --------------------------------------------------------------------------------  No significant changes to PMH, PSH, FHx, SHx, unless otherwise noted    ALLERGIES & MEDICATIONS  --------------------------------------------------------------------------------  Allergies    No Known Drug Allergies  shellfish (Anaphylaxis)    Intolerances      Standing Inpatient Medications  amLODIPine   Tablet 2.5 milliGRAM(s) Oral once  chlorhexidine 2% Cloths 1 Application(s) Topical <User Schedule>  dextrose 5%. 1000 milliLiter(s) IV Continuous <Continuous>  dextrose 50% Injectable 25 Gram(s) IV Push once  dextrose 50% Injectable 12.5 Gram(s) IV Push once  dextrose 50% Injectable 25 Gram(s) IV Push once  dextrose Oral Gel 15 Gram(s) Oral once  glucagon  Injectable 1 milliGRAM(s) IntraMuscular once  heparin   Injectable 5000 Unit(s) SubCutaneous every 12 hours  QUEtiapine 25 milliGRAM(s) Oral two times a day    PRN Inpatient Medications      REVIEW OF SYSTEMS  unable to obtain      VITALS/PHYSICAL EXAM  --------------------------------------------------------------------------------  T(C): 36.7 (02-14-23 @ 11:00), Max: 36.7 (02-14-23 @ 04:53)  HR: 97 (02-14-23 @ 11:00) (97 - 102)  BP: 135/74 (02-14-23 @ 11:00) (135/74 - 164/84)  RR: 18 (02-14-23 @ 11:00) (18 - 18)  SpO2: 99% (02-14-23 @ 11:00) (96% - 99%)  Wt(kg): --        02-13-23 @ 07:01  -  02-14-23 @ 07:00  --------------------------------------------------------  IN: 0 mL / OUT: 1700 mL / NET: -1700 mL    02-14-23 @ 07:01  -  02-14-23 @ 14:59  --------------------------------------------------------  IN: 0 mL / OUT: 400 mL / NET: -400 mL      Physical Exam:  	Gen: NAD  	HEENT:  supple neck, clear oropharynx  	Pulm: CTA B/L  	CV: RRR, S1S2; no rub  	Abd: +BS, soft, nontender/nondistended  	: reza  	UE: Warm,no edema  	LE: Warm, no edema  	Neuro: confused  	Skin: Warm,    LABS/STUDIES  --------------------------------------------------------------------------------              13.1   8.66  >-----------<  216      [02-14-23 @ 09:54]              42.0     138  |  104  |  24.3  ----------------------------<  112      [02-14-23 @ 09:54]  4.3   |  25.0  |  3.55        Ca     8.9     [02-14-23 @ 09:54]      Mg     1.7     [02-13-23 @ 06:30]      Phos  2.7     [02-13-23 @ 06:30]            Creatinine Trend:  SCr 3.55 [02-14 @ 09:54]  SCr 3.82 [02-14 @ 04:38]  SCr 5.18 [02-13 @ 06:30]  SCr 7.76 [02-12 @ 07:32]  SCr 13.48 [02-11 @ 04:52]    Urinalysis - [02-09-23 @ 19:36]      Color Yellow / Appearance Clear / SG 1.005 / pH 7.0      Gluc 50 / Ketone Trace  / Bili Negative / Urobili Negative       Blood Large / Protein 100 / Leuk Est Moderate / Nitrite Negative      RBC 25-50 / WBC 11-25 / Hyaline  / Gran  / Sq Epi  / Non Sq Epi Occasional / Bacteria Occasional      PTH -- (Ca 9.0)      [02-14-23 @ 04:38]   94  Vitamin D (25OH) 41.4      [02-14-23 @ 04:38]  TSH 2.31      [02-09-23 @ 14:15]    HBsAb <3.0      [02-09-23 @ 19:36]  HBsAb Nonreact      [02-09-23 @ 19:36]  HBsAg Nonreact      [02-09-23 @ 19:36]  HBcAb Nonreact      [02-09-23 @ 19:36]  HCV 0.09, Nonreact      [02-09-23 @ 19:36]

## 2023-02-14 NOTE — PROGRESS NOTE ADULT - ASSESSMENT
The patient is a 77 year old male with PMH of HTN, HLD, CKD 3b (baseline creatinine ~1.9mg/dL in August 2022), hx of prostate Ca s/p chemoradiation +/- ? prostatectomy, active bladder Ca s/p resection x 2 and immunotherapy (last treatment was in August 2022) presents with weakness, anorexia, anuria and altered mental status. Labs showed K 8.5, , Cr 31.5. Admitted for obstructive uropathy s/p cobb placement, then cystoscopy and urethral dilatation and placement 20F 3-way cobb on 02/11/23. Nephrology was consulted for VAHE (mproving) on CKD + electrolyte derangement (resolved) and acid base disturbance (resolved).     VAHE (on CKD)- secondary to obstructive uropathy   -Baseline creatinine is 1.9mg/dL (in August 2022)  -On admission, creatinine is 31.5mg/dL  -Bedside ultrasound showed distended bladder   -s/p cobb placement in ED, then later with cystoscopy and urethral dilatation and placement 20F 3-way cobb on 02/11/23  -Latest creatinine improved to 3.5     HTN:  - BP improving; continue amlodipine 2.5 mg daily.    Hypernatremia, resolved   Continue D5w at 50 cc/hr; pt with poor po intake/ on wrist restraints

## 2023-02-14 NOTE — PROGRESS NOTE ADULT - ASSESSMENT
77M with PMHX HTN, HLD, CVA, CKD, Bladder CA s/p intravesicular resection/chemo with Gemcitabine, Prostate CA s/p Radiation c/o AMS, weakness/lethargy/FTT admitted to MICU for Severe Hyperkalemia, ARF on CKD now ATN 2/2 Obstructive Uropathy, Metabolic Acidosis and Acute UTI. Franco was placed pt was started on ivf and lokelma with improved kidney function, Nephrology team follows. UTI was ruled out with negative urine cx, hence Abx were stopped after 3 days. Hospital stay c/b worsening of hematuria, hence pt went to OR for cystoscopy and CBI was placed on 02/11. CBI clumped on 02/12, since hematuria has resolved and BUN/Cr has been trending down with Franco in place and ivf. Pt also noted to be in hyperactive delirium and pulling on lines and Franco, hence was started on seroquel.       AMS due to uremia and hospital acquired delirium  - managemant of esmer as bellow  - c/w seroquel 25 mg bid, appears more calm, d/c restrains today  -Maintain Restricts for safety as patient reportedly attempted to remove Franco    #Sever obstructive ARF from hematuria from obstructive BPH in pt with hx of prostate cancer, has been improving   #Hyperkalemia, has resolved  # Hypernatremia, has resolved  - bmp noted  - c/w d5w @ 50 cc/hr for now  - CBI is clumped and will remains in as Franco cath, placed on 02/11  -Nephro consult noted    Acute UTI, was ruled out with negative cx  - stopped abx, will observe off abx    Dysphagia, FTT, Debility  -resumed diet given pt did well with bedside screening     Bladder CA, Prostate CA  -CT abd/pelv revealed markedly distended bladder and a large soft tissue mass inseparable from posterior bladder and prostate gland  -s/p surgical intervention, radiation, chemo with Gemcitabine  -Maintain Franco Cath    DVT ppx  - lovenox  Code/social  - DNR/DNI/Do not want HD per patient and family wishes, updated daughter at bedside on 02/13  Dispo - plan for [facility] once pt  delirium is better

## 2023-02-14 NOTE — PROGRESS NOTE ADULT - SUBJECTIVE AND OBJECTIVE BOX
[facility] Division of Hospital Medicine    Chief Complaint:  hematuria    SUBJECTIVE: doesn't verbalized complains, appears pleasantly confused, continue to show improvement on mentation    OVERNIGHT EVENTS: none reported     denied any cp, sob, abdominal pain, n/v/d    MEDICATIONS  (STANDING):  amLODIPine   Tablet 2.5 milliGRAM(s) Oral once  chlorhexidine 2% Cloths 1 Application(s) Topical <User Schedule>  dextrose 5%. 1000 milliLiter(s) (50 mL/Hr) IV Continuous <Continuous>  dextrose 50% Injectable 25 Gram(s) IV Push once  dextrose 50% Injectable 12.5 Gram(s) IV Push once  dextrose 50% Injectable 25 Gram(s) IV Push once  dextrose Oral Gel 15 Gram(s) Oral once  glucagon  Injectable 1 milliGRAM(s) IntraMuscular once  heparin   Injectable 5000 Unit(s) SubCutaneous every 12 hours  QUEtiapine 25 milliGRAM(s) Oral two times a day    MEDICATIONS  (PRN):    I&O's Summary    11 Feb 2023 07:01  -  12 Feb 2023 07:00  --------------------------------------------------------  IN: 3550 mL / OUT: 15704 mL / NET: -55731 mL        PHYSICAL EXAM:  Vital Signs Last 24 Hrs  T(C): 36.7 (14 Feb 2023 11:00), Max: 36.7 (14 Feb 2023 04:53)  T(F): 98 (14 Feb 2023 11:00), Max: 98.1 (14 Feb 2023 04:53)  HR: 97 (14 Feb 2023 11:00) (97 - 102)  BP: 135/74 (14 Feb 2023 11:00) (135/74 - 164/84)  BP(mean): --  RR: 18 (14 Feb 2023 11:00) (18 - 18)  SpO2: 99% (14 Feb 2023 11:00) (96% - 99%)        CONSTITUTIONAL: NAD  ENMT: Moist oral mucosa, no pharyngeal injection or exudates; normal dentition; No JVD  RESPIRATORY: Normal respiratory effort; lungs are clear to auscultation bilaterally  CARDIOVASCULAR: Regular rate and rhythm, normal S1 and S2, no murmur/rub/gallop; No lower extremity edema; Peripheral pulses are 2+ bilaterally  ABDOMEN: Nontender to palpation, normoactive bowel sounds, no rebound/guarding; No hepatosplenomegaly, Franco in place with clear yellow urine in Franco, w/o cloths  MUSCLOSKELETAL:  no clubbing or cyanosis of digits; no joint swelling or tenderness to palpation  PSYCH: A+O to person, but not to  place or time; confused  NEUROLOGY: follows commands intermittently, CN 2-12 are intact and symmetric; no gross sensory deficits; was observed moving all 4 ext against gravity cooperating with exam.   SKIN: No rashes; no palpable lesions        LABS:                        13.1   8.66  )-----------( 216      ( 14 Feb 2023 09:54 )             42.0     02-14    138  |  104  |  24.3<H>  ----------------------------<  112<H>  4.3   |  25.0  |  3.55<H>    Ca    8.9      14 Feb 2023 09:54  Phos  2.7     02-13  Mg     1.7     02-13                CAPILLARY BLOOD GLUCOSE      POCT Blood Glucose.: 169 mg/dL (14 Feb 2023 16:16)  POCT Blood Glucose.: 103 mg/dL (14 Feb 2023 11:28)        RADIOLOGY & ADDITIONAL TESTS:  Results Reviewed:   Imaging Personally Reviewed:  Electrocardiogram Personally Reviewed:

## 2023-02-15 NOTE — PROGRESS NOTE ADULT - SUBJECTIVE AND OBJECTIVE BOX
Reason for visit: VAHE    Subjective: No acute overnight event. Patient denied any cardiac or urinary complains. No fever/chills.     ROS: All systems were reviewed in detail pertinent positive and negative mentioned above, rest are negative.    Physical Exam:  Gen: no acute distress  MS: alert, conversing normally  Eyes: EOMI, no icterus  HENT: NCAT, MMM  CV: rhythm reg reg, rate normal, no m/g/r  Chest: CTAB, no w/r/r,  Abd: soft, NT, ND  Extremities: No edema  Franco w/ good UOP  =======================================================  Vital Signs Last 24 Hrs  T(C): 36.7 (15 Feb 2023 10:45), Max: 36.9 (15 Feb 2023 04:54)  T(F): 98 (15 Feb 2023 10:45), Max: 98.4 (15 Feb 2023 04:54)  HR: 96 (15 Feb 2023 10:45) (94 - 96)  BP: 114/67 (15 Feb 2023 10:45) (114/67 - 153/82)  RR: 18 (15 Feb 2023 10:45) (18 - 18)  SpO2: 97% (15 Feb 2023 10:45) (96% - 97%)    Parameters below as of 15 Feb 2023 10:45  Patient On (Oxygen Delivery Method): room air      I&O's Summary    14 Feb 2023 07:01  -  15 Feb 2023 07:00  --------------------------------------------------------  IN: 0 mL / OUT: 3800 mL / NET: -3800 mL      =======================================================  Current Antibiotics:    Other medications:  amLODIPine   Tablet 5 milliGRAM(s) Oral daily  chlorhexidine 2% Cloths 1 Application(s) Topical <User Schedule>  dextrose 5%. 1000 milliLiter(s) IV Continuous <Continuous>  dextrose 50% Injectable 25 Gram(s) IV Push once  dextrose 50% Injectable 12.5 Gram(s) IV Push once  dextrose 50% Injectable 25 Gram(s) IV Push once  dextrose Oral Gel 15 Gram(s) Oral once  glucagon  Injectable 1 milliGRAM(s) IntraMuscular once  heparin   Injectable 5000 Unit(s) SubCutaneous every 12 hours  QUEtiapine 25 milliGRAM(s) Oral two times a day    =======================================================  02-15    131<L>  |  98  |  19.4  ----------------------------<  209<H>  4.3   |  23.0  |  3.08<H>    Ca    8.5      15 Feb 2023 08:55  Phos  2.0     02-15  Mg     1.7     02-15      Creatinine, Serum: 3.08 mg/dL (02-15-23 @ 08:55)  Creatinine, Serum: 3.55 mg/dL (02-14-23 @ 09:54)  Creatinine, Serum: 3.82 mg/dL (02-14-23 @ 04:38)  Creatinine, Serum: 5.18 mg/dL (02-13-23 @ 06:30)  Creatinine, Serum: 7.76 mg/dL (02-12-23 @ 07:32)  Creatinine, Serum: 13.48 mg/dL (02-11-23 @ 04:52)  Creatinine, Serum: 16.03 mg/dL (02-10-23 @ 17:17)      =======================================================

## 2023-02-15 NOTE — DISCHARGE NOTE PROVIDER - DETAILS OF MALNUTRITION DIAGNOSIS/DIAGNOSES
This patient has been assessed with a concern for Malnutrition and was treated during this hospitalization for the following Nutrition diagnosis/diagnoses:     -  02/12/2023: Moderate protein-calorie malnutrition

## 2023-02-15 NOTE — DISCHARGE NOTE PROVIDER - NSDCFUSCHEDAPPT_GEN_ALL_CORE_FT
Lorenza Rosado  [facility] Physician [facility]  INTMED [address]  Scheduled Appointment: 02/21/2023    Lorenza Rosado  [facility]  [facility] [place] [address]  Scheduled Appointment: 03/08/2023    [facility]  UROLOGY [address]  Scheduled Appointment: 03/22/2023    Douglas Loyola  [facility]  UROLOGY [address]  Scheduled Appointment: 03/22/2023     Lorenza Rosado  [facility] Physician [facility]  INTMED [address]  Scheduled Appointment: 02/21/2023    Lorenza Rosado  [facility] [address]  Scheduled Appointment: 03/08/2023    [facility]  UROLOGY [address]  Scheduled Appointment: 03/22/2023    Douglas Loyola  [facility]  UROLOGY [address]  Scheduled Appointment: 03/22/2023    Deny Greenwood  [facility]  NEUROLOGY [address]  Scheduled Appointment: 05/16/2023

## 2023-02-15 NOTE — PHYSICAL THERAPY INITIAL EVALUATION ADULT - PERTINENT HX OF CURRENT PROBLEM, REHAB EVAL
77M with PMHX HTN, HLD, CVA, CKD, Bladder CA s/p intravesicular resection/chemo with Gemcitabine, Prostate CA s/p Radiation c/o AMS, weakness/lethargy/FTT admitted to MICU for Severe Hyperkalemia, ARF on CKD now ATN 2/2 Obstructive Uropathy, Metabolic Acidosis and Acute UTI. Franco was placed pt was started on ivf and lokelma with improved kidney function, Nephrology team follows. UTI was ruled out with negative urine cx, hence Abx were stopped after 3 days. Hospital stay c/b worsening of hematuria, hence pt went to OR for cystoscopy and CBI was placed on 02/11. CBI clumped on 02/12, since hematuria has resolved and BUN/Cr has been trending down with Franco in place and ivf. Pt also noted to be in hyperactive delirium and pulling on lines and Franco, hence was started on seroquel.

## 2023-02-15 NOTE — PROGRESS NOTE ADULT - ASSESSMENT
77M with PMHX HTN, HLD, CVA, CKD, Bladder CA s/p intravesicular resection/chemo with Gemcitabine, Prostate CA s/p Radiation c/o AMS, weakness/lethargy/FTT admitted to MICU for Severe Hyperkalemia, ARF on CKD now ATN 2/2 Obstructive Uropathy, Metabolic Acidosis and Acute UTI. Franco was placed pt was started on ivf and lokelma with improved kidney function, Nephrology team follows. UTI was ruled out with negative urine cx, hence Abx were stopped after 3 days. Hospital stay c/b worsening of hematuria, hence pt went to OR for cystoscopy and CBI was placed on 02/11. CBI clumped on 02/12, since hematuria has resolved and BUN/Cr has been trending down with Franco in place and ivf. Pt also noted to be in hyperactive delirium and pulling on lines and Franco, hence was started on seroquel. Pt is now calm and cooperative, off restrains and stable to be transferred to [facility].      AMS due to uremia and hospital acquired delirium  - managemant of esmer as bellow  - c/w seroquel 25 mg bid,  calm and more coherent today   - delirium bundle    #Sever obstructive ARF from hematuria from obstructive BPH in pt with hx of prostate cancer, has been improving   #Hyperkalemia, has resolved  # Hypernatremia, has resolved  - bmp noted  - d/c d5w @ 50 cc/hr for now  - CBI is clumped and will remains in as Franco cath, placed on 02/11  -Nephro consult noted    Acute UTI, was ruled out with negative cx  - stopped abx, will observe off abx    Dysphagia, FTT, Debility  -resumed diet given pt did well with bedside screening     Bladder CA, Prostate CA  -CT abd/pelv revealed markedly distended bladder and a large soft tissue mass inseparable from posterior bladder and prostate gland  -s/p surgical intervention, radiation, chemo with Gemcitabine  -Maintain Franco Cath    DVT ppx  - lovenox  Code/social  - DNR/DNI/Do not want HD per patient and family wishes, updated daughter at bedside on 02/13  Dispo - stable for [facility]

## 2023-02-15 NOTE — DISCHARGE NOTE PROVIDER - ATTENDING DISCHARGE PHYSICAL EXAMINATION:
Vital Signs Last 24 Hrs  T(C): 37.3 (18 Feb 2023 04:57), Max: 37.3 (18 Feb 2023 04:57)  T(F): 99.2 (18 Feb 2023 04:57), Max: 99.2 (18 Feb 2023 04:57)  HR: 98 (18 Feb 2023 04:57) (96 - 101)  BP: 122/69 (18 Feb 2023 04:57) (105/66 - 126/75)  BP(mean): --  RR: 16 (18 Feb 2023 04:57) (16 - 18)  SpO2: 96% (18 Feb 2023 04:57) (96% - 98%)    Parameters below as of 18 Feb 2023 04:57  Patient On (Oxygen Delivery Method): room air    CONSTITUTIONAL: NAD  ENMT: Moist oral mucosa, no pharyngeal injection or exudates; normal dentition; No JVD  RESPIRATORY: Normal respiratory effort; lungs are clear to auscultation bilaterally  CARDIOVASCULAR: Regular rate and rhythm, normal S1 and S2, no murmur/rub/gallop; No lower extremity edema; Peripheral pulses are 2+ bilaterally  ABDOMEN: Nontender to palpation, normoactive bowel sounds, no rebound/guarding; No hepatosplenomegaly, Franco in place with clear yellow urine in Franco, w/o cloths  MUSCLOSKELETAL:  no clubbing or cyanosis of digits; no joint swelling or tenderness to palpation  PSYCH: A+O to person, but not to  place or time; pleasantly confused  NEUROLOGY: follows commands intermittently, CN 2-12 are intact and symmetric; no gross sensory deficits; was observed moving all 4 ext against gravity cooperating with exam.   SKIN: No rashes; no palpable lesions

## 2023-02-15 NOTE — PHYSICAL THERAPY INITIAL EVALUATION ADULT - ADDITIONAL COMMENTS
pt is a poor historian. per [facility] assessment, pt lives with his spouse in his daughter's house on the main level. there is 1 step to enter. assessment states bath and shower up a flight of stairs.

## 2023-02-15 NOTE — PROGRESS NOTE ADULT - SUBJECTIVE AND OBJECTIVE BOX
[facility] Division of Hospital Medicine    Chief Complaint:  hematuria    SUBJECTIVE: doesn't verbalized complains, appears pleasantly confused, continue to show some improvement on mentation    OVERNIGHT EVENTS: none reported     denied any cp, sob, abdominal pain, n/v/d    MEDICATIONS  (STANDING):  amLODIPine   Tablet 2.5 milliGRAM(s) Oral once  chlorhexidine 2% Cloths 1 Application(s) Topical <User Schedule>  dextrose 5%. 1000 milliLiter(s) (50 mL/Hr) IV Continuous <Continuous>  dextrose 50% Injectable 25 Gram(s) IV Push once  dextrose 50% Injectable 12.5 Gram(s) IV Push once  dextrose 50% Injectable 25 Gram(s) IV Push once  dextrose Oral Gel 15 Gram(s) Oral once  glucagon  Injectable 1 milliGRAM(s) IntraMuscular once  heparin   Injectable 5000 Unit(s) SubCutaneous every 12 hours  QUEtiapine 25 milliGRAM(s) Oral two times a day    MEDICATIONS  (PRN):    I&O's Summary    11 Feb 2023 07:01  -  12 Feb 2023 07:00  --------------------------------------------------------  IN: 3550 mL / OUT: 96249 mL / NET: -34054 mL        PHYSICAL EXAM:  Vital Signs Last 24 Hrs  T(C): 36.7 (14 Feb 2023 11:00), Max: 36.7 (14 Feb 2023 04:53)  T(F): 98 (14 Feb 2023 11:00), Max: 98.1 (14 Feb 2023 04:53)  HR: 97 (14 Feb 2023 11:00) (97 - 102)  BP: 135/74 (14 Feb 2023 11:00) (135/74 - 164/84)  BP(mean): --  RR: 18 (14 Feb 2023 11:00) (18 - 18)  SpO2: 99% (14 Feb 2023 11:00) (96% - 99%)        CONSTITUTIONAL: NAD  ENMT: Moist oral mucosa, no pharyngeal injection or exudates; normal dentition; No JVD  RESPIRATORY: Normal respiratory effort; lungs are clear to auscultation bilaterally  CARDIOVASCULAR: Regular rate and rhythm, normal S1 and S2, no murmur/rub/gallop; No lower extremity edema; Peripheral pulses are 2+ bilaterally  ABDOMEN: Nontender to palpation, normoactive bowel sounds, no rebound/guarding; No hepatosplenomegaly, Franco in place with clear yellow urine in Franco, w/o cloths  MUSCLOSKELETAL:  no clubbing or cyanosis of digits; no joint swelling or tenderness to palpation  PSYCH: A+O to person, but not to  place or time; confused  NEUROLOGY: follows commands intermittently, CN 2-12 are intact and symmetric; no gross sensory deficits; was observed moving all 4 ext against gravity cooperating with exam.   SKIN: No rashes; no palpable lesions        LABS:                        12.1   8.62  )-----------( 224      ( 15 Feb 2023 08:55 )             39.2     02-15    131<L>  |  98  |  19.4  ----------------------------<  209<H>  4.3   |  23.0  |  3.08<H>    Ca    8.5      15 Feb 2023 08:55  Phos  2.0     02-15  Mg     1.7     02-15                CAPILLARY BLOOD GLUCOSE      POCT Blood Glucose.: 138 mg/dL (15 Feb 2023 12:02)  POCT Blood Glucose.: 124 mg/dL (15 Feb 2023 07:40)  POCT Blood Glucose.: 132 mg/dL (14 Feb 2023 21:51)  POCT Blood Glucose.: 169 mg/dL (14 Feb 2023 16:16)        RADIOLOGY & ADDITIONAL TESTS:  Results Reviewed:   Imaging Personally Reviewed:  Electrocardiogram Personally Reviewed:

## 2023-02-15 NOTE — DISCHARGE NOTE PROVIDER - HOSPITAL COURSE
77M with PMHX HTN, HLD, CVA, CKD, Bladder CA s/p intravesicular resection/chemo with Gemcitabine, Prostate CA s/p Radiation c/o AMS, weakness/lethargy/FTT admitted to MICU for Severe Hyperkalemia, ARF on CKD now ATN 2/2 Obstructive Uropathy, Metabolic Acidosis and Acute UTI. Franco was placed pt was started on ivf and lokelma with improved kidney function, Nephrology team follows. UTI was ruled out with negative urine cx, hence Abx were stopped after 3 days. Hospital stay c/b worsening of hematuria, hence pt went to OR for cystoscopy and CBI was placed on 02/11. CBI clumped on 02/12, since hematuria has resolved and BUN/Cr has been trending down with Franco in place and ivf. Pt also noted to be in hyperactive delirium and pulling on lines and Franco, hence was started on seroquel. Pt is now calm and cooperative, off restrains and stable to be transferred to [facility]. 77M with PMHX HTN, HLD, CVA, CKD, Bladder CA s/p intravesicular resection/chemo with Gemcitabine, Prostate CA s/p Radiation c/o AMS, weakness/lethargy/FTT admitted to MICU for Severe Hyperkalemia, ARF on CKD now ATN 2/2 Obstructive Uropathy, Metabolic Acidosis and Acute UTI. Franco was placed pt was started on ivf and lokelma with improved kidney function, Nephrology team follows. UTI was ruled out with negative urine cx, hence Abx were stopped after 3 days. Hospital stay c/b worsening of hematuria, hence pt went to OR for cystoscopy and CBI was placed on 02/11. CBI clumped on 02/12, since then hematuria has resolved and BUN/Cr has been trending down with Franco in place and ivf. Upon several discussion with family ( daughter Yogi and pt wife) patient wound not want to persue any further tx or tests  for potential recurrens of prostate and bladder cancer, Franco would be in placed for comfort measures and can be changed every 4 weeks at [facility].   Pt also noted to be in hyperactive delirium and pulling on lines and Franco, hence was started on seroquel. Pt is now calm and cooperative, off restrains and stable to be transferred to [facility], currently on seroquel 12.5 mg in am and 25 mg in pm.

## 2023-02-15 NOTE — DISCHARGE NOTE PROVIDER - NSDCCPCAREPLAN_GEN_ALL_CORE_FT
PRINCIPAL DISCHARGE DIAGNOSIS  Diagnosis: Urinary tract obstruction  Assessment and Plan of Treatment: - due to hematuria from presumed recurrence of prostate cancer  - please keep cobb in and exchange every 4 weeks, current cobb placed on 02/11.         SECONDARY DISCHARGE DIAGNOSES  Diagnosis: Hyperkalemia  Assessment and Plan of Treatment: - resolved with improved VAHE    Diagnosis: VAHE (acute kidney injury)  Assessment and Plan of Treatment: - obstructive in nature, on d/c Cr down to 3 from 10    Diagnosis: HTN (hypertension)  Assessment and Plan of Treatment: - losartan was switched to amlodipin for now due to above    Diagnosis: Dementia  Assessment and Plan of Treatment: - continue current dose of seroquel       Diagnosis: Glaucoma  Assessment and Plan of Treatment: - continue medication as directed    Diagnosis: History of prostate cancer  Assessment and Plan of Treatment: - see details in d/c summary

## 2023-02-15 NOTE — PROGRESS NOTE ADULT - ASSESSMENT
77 year old male with PMH of HTN, HLD, CKD 3b (baseline creatinine ~1.9mg/dL in August 2022), hx of prostate Ca s/p chemoradiation +/- ? prostatectomy, active bladder Ca s/p resection x 2 and immunotherapy (last treatment was in August 2022) presents with weakness, anorexia, anuria and altered mental status. Labs showed K 8.5, , Cr 31.5. Admitted for obstructive uropathy s/p cobb placement, then cystoscopy and urethral dilatation and placement 20F 3-way cobb on 02/11/23. Nephrology was consulted for VAHE (mproving) on CKD + electrolyte derangement (resolved) and acid base disturbance (resolved).     VAHE (on CKD)- secondary to obstructive uropathy   -Baseline creatinine is 1.9mg/dL (in August 2022)  -On admission, creatinine is 31.5mg/dL  -Bedside ultrasound showed distended bladder   -s/p cobb placement in ED, then later with cystoscopy and urethral dilatation and placement 20F 3-way cobb on 02/11/23  -Latest creatinine improved to 3.0, downward SCr trend will continue to follow closely     HTN:  - BP improving; continue amlodipine 2.5 mg daily.    Hypernatremia, resolved --in fact Na down to 131 today, will discontinue hypoosmolar D5w and follow.

## 2023-02-15 NOTE — CHART NOTE - NSCHARTNOTEFT_GEN_A_CORE
Palliative care social work note.    SW contacted patients daughter Yris. Message left providing contact information and introducing role on palliative service.

## 2023-02-16 NOTE — PROGRESS NOTE ADULT - ASSESSMENT
77M with PMHX HTN, HLD, CVA, CKD, Bladder CA s/p intravesicular resection/chemo with Gemcitabine, Prostate CA s/p Radiation c/o AMS, weakness/lethargy/FTT admitted to MICU for Severe Hyperkalemia, ARF on CKD now ATN 2/2 Obstructive Uropathy, Metabolic Acidosis and Acute UTI. Franco was placed pt was started on ivf and lokelma with improved kidney function, Nephrology team follows. UTI was ruled out with negative urine cx, hence Abx were stopped after 3 days. Hospital stay c/b worsening of hematuria, hence pt went to OR for cystoscopy and CBI was placed on 02/11. CBI clumped on 02/12, since hematuria has resolved and BUN/Cr has been trending down with Franco in place and ivf. Pt also noted to be in hyperactive delirium and pulling on lines and Franco, hence was started on seroquel. Pt is now calm and cooperative, off restrains and stable to be transferred to [facility].      AMS due to uremia and hospital acquired delirium  - managemant of esmer as bellow  - c/w seroquel 25 mg qhs and 12.5 mg in am,  calm and more coherent today   - delirium bundle    #Sever obstructive ARF from hematuria from obstructive BPH in pt with hx of prostate cancer, has been improving   #Hyperkalemia, has resolved  # Hypernatremia, has resolved  - bmp noted  - CBI is clumped and will remains in as Franco cath, placed on 02/11  -Nephro consult noted    Acute UTI, was ruled out with negative cx    Dysphagia, FTT, Debility  -resumed diet given pt did well with bedside screening     Bladder CA, Prostate CA  -CT abd/pelv revealed markedly distended bladder and a large soft tissue mass inseparable from posterior bladder and prostate gland  -s/p surgical intervention, radiation, chemo with Gemcitabine  -Maintain Franco Cath    DVT ppx  - lovenox  Code/social  - DNR/DNI/Do not want HD per patient and family wishes, updated daughter at bedside on 02/15  Dispo - stable for [facility]

## 2023-02-16 NOTE — PROGRESS NOTE ADULT - SUBJECTIVE AND OBJECTIVE BOX
[facility] Division of Hospital Medicine    Chief Complaint:  hematuria    SUBJECTIVE: continue to show some improvement on mentation, calm and cooperative    OVERNIGHT EVENTS: none reported     denied any cp, sob, abdominal pain, n/v/d    MEDICATIONS  (STANDING):  amLODIPine   Tablet 5 milliGRAM(s) Oral daily  chlorhexidine 2% Cloths 1 Application(s) Topical <User Schedule>  dextrose 50% Injectable 25 Gram(s) IV Push once  dextrose 50% Injectable 12.5 Gram(s) IV Push once  dextrose 50% Injectable 25 Gram(s) IV Push once  dextrose Oral Gel 15 Gram(s) Oral once  glucagon  Injectable 1 milliGRAM(s) IntraMuscular once  heparin   Injectable 5000 Unit(s) SubCutaneous every 12 hours  QUEtiapine 25 milliGRAM(s) Oral at bedtime  QUEtiapine 12.5 milliGRAM(s) Oral daily    MEDICATIONS  (PRN):  acetaminophen     Tablet .. 650 milliGRAM(s) Oral every 6 hours PRN Temp greater or equal to 38C (100.4F), Moderate Pain (4 - 6)    I&O's Summary    11 Feb 2023 07:01  -  12 Feb 2023 07:00  --------------------------------------------------------  IN: 3550 mL / OUT: 32796 mL / NET: -47768 mL        PHYSICAL EXAM:  Vital Signs Last 24 Hrs  T(C): 36.8 (16 Feb 2023 11:28), Max: 37.1 (15 Feb 2023 17:01)  T(F): 98.2 (16 Feb 2023 11:28), Max: 98.7 (15 Feb 2023 17:01)  HR: 95 (16 Feb 2023 11:28) (92 - 103)  BP: 115/75 (16 Feb 2023 11:28) (115/75 - 146/75)  BP(mean): --  RR: 18 (16 Feb 2023 11:28) (18 - 18)  SpO2: 97% (16 Feb 2023 11:28) (96% - 99%)    Parameters below as of 16 Feb 2023 11:28  Patient On (Oxygen Delivery Method): room air      CONSTITUTIONAL: NAD  ENMT: Moist oral mucosa, no pharyngeal injection or exudates; normal dentition; No JVD  RESPIRATORY: Normal respiratory effort; lungs are clear to auscultation bilaterally  CARDIOVASCULAR: Regular rate and rhythm, normal S1 and S2, no murmur/rub/gallop; No lower extremity edema; Peripheral pulses are 2+ bilaterally  ABDOMEN: Nontender to palpation, normoactive bowel sounds, no rebound/guarding; No hepatosplenomegaly, Franco in place with clear yellow urine in Franco, w/o cloths  MUSCLOSKELETAL:  no clubbing or cyanosis of digits; no joint swelling or tenderness to palpation  PSYCH: A+O to person, but not to  place or time; confused  NEUROLOGY: follows commands intermittently, CN 2-12 are intact and symmetric; no gross sensory deficits; was observed moving all 4 ext against gravity cooperating with exam.   SKIN: No rashes; no palpable lesions        LABS:                        12.1   8.62  )-----------( 224      ( 15 Feb 2023 08:55 )             39.2     02-16    135  |  102  |  19.0  ----------------------------<  170<H>  4.6   |  23.0  |  2.76<H>    Ca    8.7      16 Feb 2023 08:57  Phos  2.0     02-15  Mg     1.7     02-15                CAPILLARY BLOOD GLUCOSE      POCT Blood Glucose.: 103 mg/dL (16 Feb 2023 05:32)  POCT Blood Glucose.: 125 mg/dL (15 Feb 2023 19:59)  POCT Blood Glucose.: 102 mg/dL (15 Feb 2023 16:42)        RADIOLOGY & ADDITIONAL TESTS:  Results Reviewed:   Imaging Personally Reviewed:  Electrocardiogram Personally Reviewed:

## 2023-02-16 NOTE — PROGRESS NOTE ADULT - NUTRITIONAL ASSESSMENT
This patient has been assessed with a concern for Malnutrition and has been determined to have a diagnosis/diagnoses of Moderate protein-calorie malnutrition.    This patient is being managed with:   Diet Regular-  Soft and Bite Sized (SOFTBTSZ)  Entered: Feb 12 2023  7:34AM

## 2023-02-16 NOTE — PROGRESS NOTE ADULT - ASSESSMENT
The patient is a 77 year old male with PMH of HTN, HLD, CKD 3b (baseline creatinine ~1.9mg/dL in August 2022), hx of prostate Ca s/p chemoradiation +/- ? prostatectomy, active bladder Ca s/p resection x 2 and immunotherapy (last treatment was in August 2022) presents with weakness, anorexia, anuria and altered mental status. Labs showed K 8.5, , Cr 31.5. Admitted for obstructive uropathy s/p cobb placement, then cystoscopy and urethral dilatation and placement 20F 3-way cobb on 02/11/23. Nephrology was consulted for VAHE (mproving) on CKD + electrolyte derangement (resolved) and acid base disturbance (resolved).     -VAHE (on CKD) is secondary to obstructive uropathy   -Baseline creatinine is 1.9mg/dL (in August 2022)  -On admission, creatinine peaked at 31.5mg/dL  -Bedside ultrasound showed distended bladder   -s/p cobb placement in ED, then later with cystoscopy and urethral dilatation and placement 20F 3-way cobb on 02/11/23  -Latest creatinine improved to 2.7mg/dL  -Hemodynamics - BP improved - unable to restart ARB due to VAHE (on CKD) - continue amlodipine 5mg PO daily for time being   -Metabolic acidosis in setting of CKD - serum bicarb is accepable  -Anemia in setting of CKD - hemoglobin is at goal   -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

## 2023-02-16 NOTE — PROGRESS NOTE ADULT - SUBJECTIVE AND OBJECTIVE BOX
Sleeping this a.m. but wakes up to voice. Complained of having cobb. Educated patient that he will need to have cobb until outpatient follow up with his Urologist.     Vital Signs Last 24 Hrs  T(C): 37 (16 Feb 2023 04:00), Max: 37.1 (15 Feb 2023 17:01)  T(F): 98.6 (16 Feb 2023 04:00), Max: 98.7 (15 Feb 2023 17:01)  HR: 103 (16 Feb 2023 04:00) (92 - 103)  BP: 146/75 (16 Feb 2023 04:00) (116/- - 146/75)  BP(mean): --  RR: 18 (16 Feb 2023 04:00) (18 - 18)  SpO2: 99% (16 Feb 2023 04:00) (96% - 99%)    Parameters below as of 16 Feb 2023 04:00  Patient On (Oxygen Delivery Method): room air    I&O's Summary    15 Feb 2023 07:01  -  16 Feb 2023 07:00  --------------------------------------------------------  IN: 0 mL / OUT: 1250 mL / NET: -1250 mL    Physical Exam  General: Frail elderly male in [facility], room air, sleeping but wakes up to voice   Cardiac: S1S2 RRR  Respiratory: CTAB  Abdomen: Soft, NT  Extremities: No appreciable edema  : +Indwelling cobb   Neuro: Sleeping but wakes up to voice    02-16    135  |  102  |  19.0  ----------------------------<  170<H>  4.6   |  23.0  |  2.76<H>    Ca    8.7      16 Feb 2023 08:57  Phos  2.0     02-15  Mg     1.7     02-15                        12.1   8.62  )-----------( 224      ( 15 Feb 2023 08:55 )             39.2     MEDICATIONS  (STANDING):  amLODIPine   Tablet 5 milliGRAM(s) Oral daily  chlorhexidine 2% Cloths 1 Application(s) Topical <User Schedule>  dextrose 50% Injectable 25 Gram(s) IV Push once  dextrose 50% Injectable 12.5 Gram(s) IV Push once  dextrose 50% Injectable 25 Gram(s) IV Push once  dextrose Oral Gel 15 Gram(s) Oral once  glucagon  Injectable 1 milliGRAM(s) IntraMuscular once  heparin   Injectable 5000 Unit(s) SubCutaneous every 12 hours  QUEtiapine 25 milliGRAM(s) Oral two times a day    MEDICATIONS  (PRN):  acetaminophen     Tablet .. 650 milliGRAM(s) Oral every 6 hours PRN Temp greater or equal to 38C (100.4F), Moderate Pain (4 - 6)

## 2023-02-17 NOTE — PROGRESS NOTE ADULT - SUBJECTIVE AND OBJECTIVE BOX
[facility] Division of Hospital Medicine    Chief Complaint:  hematuria    SUBJECTIVE: calm and cooperative today, no new complains, minimal verbal response    OVERNIGHT EVENTS: none reported     denied any cp, sob, abdominal pain, n/v/d    MEDICATIONS  (STANDING):  amLODIPine   Tablet 5 milliGRAM(s) Oral daily  chlorhexidine 2% Cloths 1 Application(s) Topical <User Schedule>  dextrose 50% Injectable 25 Gram(s) IV Push once  dextrose 50% Injectable 12.5 Gram(s) IV Push once  dextrose 50% Injectable 25 Gram(s) IV Push once  dextrose Oral Gel 15 Gram(s) Oral once  glucagon  Injectable 1 milliGRAM(s) IntraMuscular once  heparin   Injectable 5000 Unit(s) SubCutaneous every 12 hours  QUEtiapine 25 milliGRAM(s) Oral at bedtime  QUEtiapine 12.5 milliGRAM(s) Oral daily    MEDICATIONS  (PRN):  acetaminophen     Tablet .. 650 milliGRAM(s) Oral every 6 hours PRN Temp greater or equal to 38C (100.4F), Moderate Pain (4 - 6)     I&O's Summary    11 Feb 2023 07:01  -  12 Feb 2023 07:00  --------------------------------------------------------  IN: 3550 mL / OUT: 65578 mL / NET: -08382 mL        PHYSICAL EXAM:  Vital Signs Last 24 Hrs  T(C): 36.7 (17 Feb 2023 10:56), Max: 37.4 (17 Feb 2023 04:25)  T(F): 98 (17 Feb 2023 10:56), Max: 99.3 (17 Feb 2023 04:25)  HR: 96 (17 Feb 2023 10:56) (91 - 96)  BP: 126/75 (17 Feb 2023 10:56) (102/61 - 126/75)  BP(mean): --  RR: 18 (17 Feb 2023 04:25) (18 - 18)  SpO2: 98% (17 Feb 2023 10:56) (97% - 99%)    Parameters below as of 17 Feb 2023 10:56  Patient On (Oxygen Delivery Method): room air      CONSTITUTIONAL: NAD  ENMT: Moist oral mucosa, no pharyngeal injection or exudates; normal dentition; No JVD  RESPIRATORY: Normal respiratory effort; lungs are clear to auscultation bilaterally  CARDIOVASCULAR: Regular rate and rhythm, normal S1 and S2, no murmur/rub/gallop; No lower extremity edema; Peripheral pulses are 2+ bilaterally  ABDOMEN: Nontender to palpation, normoactive bowel sounds, no rebound/guarding; No hepatosplenomegaly, Franco in place with clear yellow urine in Franco, w/o cloths  MUSCLOSKELETAL:  no clubbing or cyanosis of digits; no joint swelling or tenderness to palpation  PSYCH: A+O to person, but not to  place or time; pleasantly confused  NEUROLOGY: follows commands intermittently, CN 2-12 are intact and symmetric; no gross sensory deficits; was observed moving all 4 ext against gravity cooperating with exam.   SKIN: No rashes; no palpable lesions        LABS:                        12.1   8.62  )-----------( 224      ( 15 Feb 2023 08:55 )             39.2     02-16    135  |  102  |  19.0  ----------------------------<  170<H>  4.6   |  23.0  |  2.76<H>    Ca    8.7      16 Feb 2023 08:57  Phos  2.0     02-15  Mg     1.7     02-15                CAPILLARY BLOOD GLUCOSE      POCT Blood Glucose.: 103 mg/dL (16 Feb 2023 05:32)  POCT Blood Glucose.: 125 mg/dL (15 Feb 2023 19:59)  POCT Blood Glucose.: 102 mg/dL (15 Feb 2023 16:42)        RADIOLOGY & ADDITIONAL TESTS:  Results Reviewed:   Imaging Personally Reviewed:  Electrocardiogram Personally Reviewed:

## 2023-02-17 NOTE — PROGRESS NOTE ADULT - ASSESSMENT
77M with PMHX HTN, HLD, CVA, CKD, Bladder CA s/p intravesicular resection/chemo with Gemcitabine, Prostate CA s/p Radiation c/o AMS, weakness/lethargy/FTT admitted to MICU for Severe Hyperkalemia, ARF on CKD now ATN 2/2 Obstructive Uropathy, Metabolic Acidosis and Acute UTI. Franco was placed pt was started on ivf and lokelma with improved kidney function, Nephrology team follows. UTI was ruled out with negative urine cx, hence Abx were stopped after 3 days. Hospital stay c/b worsening of hematuria, hence pt went to OR for cystoscopy and CBI was placed on 02/11. CBI clumped on 02/12, since hematuria has resolved and BUN/Cr has been trending down with Franco in place and ivf. Pt also noted to be in hyperactive delirium and pulling on lines and Franco, hence was started on seroquel. Pt is now calm and cooperative, off restrains and stable to be transferred to [facility].      AMS due to uremia and hospital acquired delirium  - managemant of esmer as bellow  - c/w seroquel 25 mg qhs and 12.5 mg in am,  calm and more coherent today   - delirium bundle    #Sever obstructive ARF from hematuria from obstructive BPH in pt with hx of prostate cancer, has been improving   #Hyperkalemia, has resolved  # Hypernatremia, has resolved  - CBI is clumped and will remains in as Franco cath, placed on 02/11  -Nephro consult noted  - urology signed off  - as per [facility] conversation with daughter Yris - pt and family would not want any further test or specific treatment for prostate/bladder cancer moving forward, Franco will stay in for comfort messures    Acute UTI, was ruled out with negative cx    Dysphagia, FTT, Debility  -resumed diet given pt did well with bedside screening     Bladder CA, Prostate CA  -CT abd/pelv revealed markedly distended bladder and a large soft tissue mass inseparable from posterior bladder and prostate gland  -s/p surgical intervention, radiation, chemo with Gemcitabine  -Maintain Franco Cath    DVT ppx  - lovenox  Code/social  - DNR/DNI/Do not want HD per patient and family wishes, updated daughter  over phone 02/17  Dispo - stable for [facility]

## 2023-02-17 NOTE — PROGRESS NOTE ADULT - ASSESSMENT
77M with HTN, HLD, CKD, ischemic CVA, ? dementia, bladder cancer/prostate cancer admitted with failure to thrive, metabolic derangements with Acute Kidney Injury on CKD, severe hyperkalemia obstructive uropathy.     #1 Acute kidney injury  - supportive measures  - trend creatinine   - avoid nephrotoxins   - family does not want hemodialysis   - improving   - s/o cystoscopy with evacuation of clots 2/11, cobb to remain in    - needs outpatient urology follow up     #2 Encephalopathy, concerns for dementia with history of CVA  - patient has been completely dependent with all activities of daily living more recently  - wife has been doing most of his activities of daily living like bathing etc for 2 years  - improved and resolved     #3 Bladder Cancer, prostate cancer   - had 2 operations first in 2015 and reoccurrence leading to last one in october of 2021     #4 Debility, cachexia   - assist with all activities of daily living  - prealbumin 13    #5 Encounter for palliative care  - Goals established, symptoms managed, will sign off. Please reconsult our service should anything change.    Aracely Corral  2002  Preferred Contact Number: [phone] (mobile), Mom Kari        Mother is calling to schedule a new patient appointment with Harriett Hardy DO.     Other [employer]      Who referred: themselves  Any immediate health concerns? Yes side and back aches a lot the last couple of months  Was the patient offered a sooner position with other providers? No    Preferred times to be called: anytime    Please call   77M with HTN, HLD, CKD, ischemic CVA, ? dementia, bladder cancer/prostate cancer admitted with failure to thrive, metabolic derangements with Acute Kidney Injury on CKD, severe hyperkalemia obstructive uropathy.     #1 Acute kidney injury  - supportive measures  - trend creatinine   - avoid nephrotoxins   - family does not want hemodialysis   - improving   - s/o cystoscopy with evacuation of clots 2/11, cobb to remain in    - needs outpatient urology follow up     #2 Encephalopathy, concerns for dementia with history of CVA  - patient has been completely dependent with all activities of daily living more recently  - wife has been doing most of his activities of daily living like bathing etc for 2 years  - improved and resolved     #3 Bladder Cancer, prostate cancer   - had 2 operations first in 2015 and reoccurrence leading to last one in october of 2021     #4 Debility, cachexia   - assist with all activities of daily living  - prealbumin 13    #5 Encounter for palliative care  - patient awaiting placement, family has been educated about hospice but they do not feel equipped to handle him at home   - Goals established, symptoms managed, will sign off. Please reconsult our service should anything change.

## 2023-02-17 NOTE — PROGRESS NOTE ADULT - PROVIDER SPECIALTY LIST ADULT
Hospitalist
Urology
Urology
Hospitalist
Nephrology
Urology
Urology
Hospitalist
Nephrology
Palliative Care
Palliative Care

## 2023-02-17 NOTE — PROGRESS NOTE ADULT - SUBJECTIVE AND OBJECTIVE BOX
OVERNIGHT EVENTS: doing well, no acute issues, afebrile     Present Symptoms:     Dyspnea: none   Nausea/Vomiting: No  Anxiety:  No  Depression: No  Fatigue: No  Loss of appetite: No  Constipation: none     Pain: none             Character-            Duration-            Effect-            Factors-            Frequency-            Location-            Severity-    Pain AD Score:  http://geriatrictoolkit.[facility]/cog/painad.pdf (press ctrl + left click to view)    Review of Systems: Reviewed                     Negative: no chest pain                     All others negative    MEDICATIONS  (STANDING):  amLODIPine   Tablet 5 milliGRAM(s) Oral daily  chlorhexidine 2% Cloths 1 Application(s) Topical <User Schedule>  dextrose 50% Injectable 25 Gram(s) IV Push once  dextrose 50% Injectable 12.5 Gram(s) IV Push once  dextrose 50% Injectable 25 Gram(s) IV Push once  dextrose Oral Gel 15 Gram(s) Oral once  glucagon  Injectable 1 milliGRAM(s) IntraMuscular once  heparin   Injectable 5000 Unit(s) SubCutaneous every 12 hours  QUEtiapine 25 milliGRAM(s) Oral at bedtime  QUEtiapine 12.5 milliGRAM(s) Oral daily    MEDICATIONS  (PRN):  acetaminophen     Tablet .. 650 milliGRAM(s) Oral every 6 hours PRN Temp greater or equal to 38C (100.4F), Moderate Pain (4 - 6)    PHYSICAL EXAM:    Vital Signs Last 24 Hrs  T(C): 37.4 (17 Feb 2023 04:25), Max: 37.4 (17 Feb 2023 04:25)  T(F): 99.3 (17 Feb 2023 04:25), Max: 99.3 (17 Feb 2023 04:25)  HR: 96 (17 Feb 2023 04:25) (91 - 96)  BP: 122/73 (17 Feb 2023 04:25) (102/61 - 122/73)  BP(mean): --  RR: 18 (17 Feb 2023 04:25) (18 - 18)  SpO2: 97% (17 Feb 2023 04:25) (97% - 99%)    Parameters below as of 17 Feb 2023 04:25  Patient On (Oxygen Delivery Method): room air    General: alert, in no acute distress     HEENT: normal     Lungs: comfortable    CV: normal      GI: normal    : cobb    MSK: weakness      Skin:  no rash    LABS:                        12.1   8.62  )-----------( 224      ( 15 Feb 2023 08:55 )             39.2     02-16    135  |  102  |  19.0  ----------------------------<  170<H>  4.6   |  23.0  |  2.76<H>    Ca    8.7      16 Feb 2023 08:57  Phos  2.0     02-15  Mg     1.7     02-15    I&O's Summary    16 Feb 2023 07:01  -  17 Feb 2023 07:00  --------------------------------------------------------  IN: 1196 mL / OUT: 1650 mL / NET: -454 mL    RADIOLOGY & ADDITIONAL STUDIES:    < from: CT Head No Cont (02.09.23 @ 19:18) >    IMPRESSION: No significant change when allowing for differences in   technique.    --- End of Report ---    < end of copied text >    < from: CT Abdomen and Pelvis No Cont (02.09.23 @ 18:48) >  IMPRESSION:  Limited noncontrast exam.    Soft tissue inseparable from the prostate gland and posterior bladder   approximately measuring 6.4 x 5.7 cm. Fiducial markers are noted.    Mild bilateral hydroureteronephrosis. Bladder is distended.    Borderline enlarged pelvic lymph nodes.    --- End of Report ---    DENNY HUSTON MD; Attending Radiologist  This document has been electronically signed. Feb 9 2023  7:24PM    < end of copied text >    [facility]VANCE DIRECTIVES/TREATMENT PREFERENCES:  do not resuscitate and do not intubate

## 2023-02-17 NOTE — PROGRESS NOTE ADULT - SUBJECTIVE AND OBJECTIVE BOX
Reason for visit: VAHE    Subjective: No acute overnight event. Patient denied any cardiac or urinary complains. No fever/chills.     ROS: All systems were reviewed in detail pertinent positive and negative mentioned above, rest are negative.    Physical Exam:  Gen: no acute distress  MS: alert, conversing normally  Eyes: EOMI, no icterus  HENT: NCAT, MMM  CV: rhythm reg reg, rate normal, no m/g/r  Chest: CTAB, no w/r/r,  Abd: soft, NT, ND  Extremities: No edema  Franco w/ good UOP    =======================================================  Vital Signs Last 24 Hrs  T(C): 36.7 (17 Feb 2023 10:56), Max: 37.4 (17 Feb 2023 04:25)  T(F): 98 (17 Feb 2023 10:56), Max: 99.3 (17 Feb 2023 04:25)  HR: 96 (17 Feb 2023 10:56) (91 - 96)  BP: 126/75 (17 Feb 2023 10:56) (102/61 - 126/75)  RR: 18 (17 Feb 2023 04:25) (18 - 18)  SpO2: 98% (17 Feb 2023 10:56) (97% - 99%)    Parameters below as of 17 Feb 2023 10:56  Patient On (Oxygen Delivery Method): room air      I&O's Summary    16 Feb 2023 07:01  -  17 Feb 2023 07:00  --------------------------------------------------------  IN: 1196 mL / OUT: 1650 mL / NET: -454 mL      =======================================================  Current Antibiotics:    Other medications:  amLODIPine   Tablet 5 milliGRAM(s) Oral daily  chlorhexidine 2% Cloths 1 Application(s) Topical <User Schedule>  dextrose 50% Injectable 25 Gram(s) IV Push once  dextrose 50% Injectable 12.5 Gram(s) IV Push once  dextrose 50% Injectable 25 Gram(s) IV Push once  dextrose Oral Gel 15 Gram(s) Oral once  glucagon  Injectable 1 milliGRAM(s) IntraMuscular once  heparin   Injectable 5000 Unit(s) SubCutaneous every 12 hours  QUEtiapine 25 milliGRAM(s) Oral at bedtime  QUEtiapine 12.5 milliGRAM(s) Oral daily    =======================================================  02-16    135  |  102  |  19.0  ----------------------------<  170<H>  4.6   |  23.0  |  2.76<H>    Ca    8.7      16 Feb 2023 08:57      Creatinine, Serum: 2.76 mg/dL (02-16-23 @ 08:57)  Creatinine, Serum: 3.08 mg/dL (02-15-23 @ 08:55)  Creatinine, Serum: 3.55 mg/dL (02-14-23 @ 09:54)  Creatinine, Serum: 3.82 mg/dL (02-14-23 @ 04:38)  Creatinine, Serum: 5.18 mg/dL (02-13-23 @ 06:30)      =======================================================

## 2023-02-17 NOTE — PROGRESS NOTE ADULT - REASON FOR ADMISSION
Acute Renal Failure, Hyperkalemia, Metabolic Acidosis

## 2023-02-17 NOTE — CHART NOTE - NSCHARTNOTEFT_GEN_A_CORE
Source: Patient [ ]  Family [ ]   other [ x] EMR, staff and ID rounds; Pt confused     Current Diet:   Diet, Regular:   Soft and Bite Sized (SOFTBTSZ) (02-12-23 @ 07:34)    Patient reports [ ] nausea  [ ] vomiting [ ] diarrhea [ ] constipation  [x ]chewing problems [x ] swallowing issues  [ ] other:     PO intake:  < 50% [x ]   50-75%  [ ]   %  [ ]  other :    Source for PO intake [ ] Patient [ ] family [x ] chart [x ] staff [ ] other    Current Weight:   (2/11)  162.4 lbs  (2/10)  167.9 lbs  (2/9)    173.5 lbs    % Weight Change: Unclear accuracy of weight 2/2 inconsistency, will continue to monitor     Pertinent Medications: MEDICATIONS  (STANDING):  amLODIPine   Tablet 5 milliGRAM(s) Oral daily  chlorhexidine 2% Cloths 1 Application(s) Topical <User Schedule>  dextrose 50% Injectable 25 Gram(s) IV Push once  dextrose 50% Injectable 12.5 Gram(s) IV Push once  dextrose 50% Injectable 25 Gram(s) IV Push once  dextrose Oral Gel 15 Gram(s) Oral once  glucagon  Injectable 1 milliGRAM(s) IntraMuscular once  heparin   Injectable 5000 Unit(s) SubCutaneous every 12 hours  QUEtiapine 25 milliGRAM(s) Oral at bedtime  QUEtiapine 12.5 milliGRAM(s) Oral daily  tamsulosin 0.4 milliGRAM(s) Oral at bedtime    MEDICATIONS  (PRN):  acetaminophen     Tablet .. 650 milliGRAM(s) Oral every 6 hours PRN Temp greater or equal to 38C (100.4F), Moderate Pain (4 - 6)    Pertinent Labs: No recent nutrition-related labs     Skin: No skin breakdown/edema noted     Nutrition focused physical exam conducted - found signs of malnutrition [ ]absent [x ]present    Subcutaneous fat loss: [x ] Orbital fat pads region, [ x]Buccal fat region, [ ]Triceps region,  [ ]Ribs region    Muscle wasting: [x ]Temples region, [x ]Clavicle region, [ ]Shoulder region, [ ]Scapula region, [ ]Interosseous region,  [ ]thigh region, [ ]Calf region    Estimated Needs:   [x ] no change since previous assessment  [ ] recalculated:     Current Nutrition Diagnosis: Pt remains at high nutrition risk secondary to malnutrition (moderate acute) related to inability to meet sufficient protein-energy needs in setting of urinary obstruction w/cystitis, advanced age, dysphagia as evidenced by mild muscle/fat loss, likely meeting <75% EER >days. Pt continues with poor PO intake completing <50% of meals with assistance. Mentation slowly improving remains confused/minimally responsive. Pt medically stable for d/c planning to [facility].     Recommendations:   1) Add Ensure BID  2) Rx MVI daily  3) Encourage HBV protein sources  4) Provide encouragement/assistance as needed during mealtimes to inc PO  5) Monitor weights daily for trend/accuracy     Monitoring and Evaluation:   [x ] PO intake [ ] Tolerance to diet prescription [X] Weights  [X] Follow up per protocol [X] Labs:

## 2023-02-17 NOTE — PROGRESS NOTE ADULT - ASSESSMENT
77 year old male with PMH of HTN, HLD, CKD 3b (baseline creatinine ~1.9mg/dL in August 2022), hx of prostate Ca s/p chemoradiation +/- ? prostatectomy, active bladder Ca s/p resection x 2 and immunotherapy (last treatment was in August 2022) presents with weakness, anorexia, anuria and altered mental status. Labs showed K 8.5, , Cr 31.5. Admitted for obstructive uropathy s/p cobb placement, then cystoscopy and urethral dilatation and placement 20F 3-way cobb on 02/11/23. Nephrology was consulted for VAHE (mproving) on CKD + electrolyte derangement (resolved) and acid base disturbance (resolved).     VAHE (on CKD)- secondary to obstructive uropathy   -Baseline creatinine is 1.9mg/dL (in August 2022)  -On admission, creatinine is 31.5mg/dL  -Bedside ultrasound showed distended bladder   -s/p cobb placement in ED, then later with cystoscopy and urethral dilatation and placement 20F 3-way cobb on 02/11/23  -Latest creatinine improved to 2.76, downward SCr trend will continue to follow closely   -BMD and AOCKD stable    HTN:  - BP ok; continue amlodipine 5 mg daily.    Hyponatremia, resolved     Outpatient follow up in nephrology outpatient clinic  Stable from nephrology standpoint for discharge w/ a cobb and outpatient urology f/u.

## 2023-02-18 NOTE — DISCHARGE NOTE NURSING/CASE MANAGEMENT/SOCIAL WORK - PATIENT PORTAL LINK FT
You can access the FollowMyHealth Patient Portal offered by [facility] by registering at the following website: [email or web address]. By joining [facility]’s FollowMyHealth portal, you will also be able to view your health information using other applications (apps) compatible with our system.

## 2023-02-18 NOTE — DISCHARGE NOTE NURSING/CASE MANAGEMENT/SOCIAL WORK - NSDCPEFALRISK_GEN_ALL_CORE
For information on Fall & Injury Prevention, visit: [email or web address] OR  [email or web address] OR  https://www.cdc.gov/steadi/patient.html

## 2023-02-18 NOTE — ASU PREOP CHECKLIST - NS PREOP CHK HIBICLENS NA
Pt to arrive via [facility] from home for c/o weakness. Pt lives at home with daughter and is normally alert and ambulatory. When pt did not come down for breakfast this am family check on her and she was difficult to arouse. When EMS arrived they were able to wake pt but pt did not want to be evaluated and is upset she is coming to the ED.    N/A

## 2023-10-16 NOTE — H&P PST ADULT - LAST STRESS TEST
For information on Fall & Injury Prevention, visit: [email or web address] OR  [email or web address] OR  https://www.cdc.gov/steadi/patient.html denies

## 2024-01-16 NOTE — H&P PST ADULT - NSANTHBPHIGHRD_ENT_A_CORE
[] : no respiratory distress [Respiration, Rhythm And Depth] : normal respiratory rhythm and effort [Exaggerated Use Of Accessory Muscles For Inspiration] : no accessory muscle use [Auscultation Breath Sounds / Voice Sounds] : lungs were clear to auscultation bilaterally Yes
